# Patient Record
Sex: FEMALE | Race: BLACK OR AFRICAN AMERICAN | Employment: OTHER | URBAN - METROPOLITAN AREA
[De-identification: names, ages, dates, MRNs, and addresses within clinical notes are randomized per-mention and may not be internally consistent; named-entity substitution may affect disease eponyms.]

---

## 2017-03-20 ENCOUNTER — HOSPITAL ENCOUNTER (INPATIENT)
Age: 57
LOS: 6 days | Discharge: HOME | DRG: 202 | End: 2017-03-26
Attending: FAMILY MEDICINE | Admitting: INTERNAL MEDICINE
Payer: COMMERCIAL

## 2017-03-20 ENCOUNTER — APPOINTMENT (OUTPATIENT)
Dept: GENERAL RADIOLOGY | Age: 57
DRG: 202 | End: 2017-03-20
Attending: FAMILY MEDICINE
Payer: COMMERCIAL

## 2017-03-20 DIAGNOSIS — J45.901 ASTHMA EXACERBATION: Primary | ICD-10-CM

## 2017-03-20 LAB
ANION GAP BLD CALC-SCNC: 13 MMOL/L (ref 3–18)
BASOPHILS # BLD AUTO: 0 K/UL (ref 0–0.06)
BASOPHILS # BLD: 1 % (ref 0–2)
BUN SERPL-MCNC: 13 MG/DL (ref 7–18)
BUN/CREAT SERPL: 13 (ref 12–20)
CALCIUM SERPL-MCNC: 9.2 MG/DL (ref 8.5–10.1)
CHLORIDE SERPL-SCNC: 107 MMOL/L (ref 100–108)
CO2 SERPL-SCNC: 25 MMOL/L (ref 21–32)
CREAT SERPL-MCNC: 0.97 MG/DL (ref 0.6–1.3)
DIFFERENTIAL METHOD BLD: ABNORMAL
EOSINOPHIL # BLD: 0.1 K/UL (ref 0–0.4)
EOSINOPHIL NFR BLD: 2 % (ref 0–5)
ERYTHROCYTE [DISTWIDTH] IN BLOOD BY AUTOMATED COUNT: 11.8 % (ref 11.6–14.5)
GLUCOSE SERPL-MCNC: 116 MG/DL (ref 74–99)
HCT VFR BLD AUTO: 37 % (ref 35–45)
HGB BLD-MCNC: 12.5 G/DL (ref 12–16)
LYMPHOCYTES # BLD AUTO: 48 % (ref 21–52)
LYMPHOCYTES # BLD: 2 K/UL (ref 0.9–3.6)
MCH RBC QN AUTO: 31 PG (ref 24–34)
MCHC RBC AUTO-ENTMCNC: 33.8 G/DL (ref 31–37)
MCV RBC AUTO: 91.8 FL (ref 74–97)
MONOCYTES # BLD: 0.2 K/UL (ref 0.05–1.2)
MONOCYTES NFR BLD AUTO: 4 % (ref 3–10)
NEUTS SEG # BLD: 1.9 K/UL (ref 1.8–8)
NEUTS SEG NFR BLD AUTO: 45 % (ref 40–73)
PLATELET # BLD AUTO: 336 K/UL (ref 135–420)
PMV BLD AUTO: 10.5 FL (ref 9.2–11.8)
POTASSIUM SERPL-SCNC: 3.9 MMOL/L (ref 3.5–5.5)
RBC # BLD AUTO: 4.03 M/UL (ref 4.2–5.3)
SODIUM SERPL-SCNC: 145 MMOL/L (ref 136–145)
WBC # BLD AUTO: 4.1 K/UL (ref 4.6–13.2)

## 2017-03-20 PROCEDURE — 99284 EMERGENCY DEPT VISIT MOD MDM: CPT

## 2017-03-20 PROCEDURE — 77030013140 HC MSK NEB VYRM -A

## 2017-03-20 PROCEDURE — 96365 THER/PROPH/DIAG IV INF INIT: CPT

## 2017-03-20 PROCEDURE — 94640 AIRWAY INHALATION TREATMENT: CPT

## 2017-03-20 PROCEDURE — 94760 N-INVAS EAR/PLS OXIMETRY 1: CPT

## 2017-03-20 PROCEDURE — 74011000250 HC RX REV CODE- 250: Performed by: INTERNAL MEDICINE

## 2017-03-20 PROCEDURE — 74011250637 HC RX REV CODE- 250/637: Performed by: FAMILY MEDICINE

## 2017-03-20 PROCEDURE — 74011000250 HC RX REV CODE- 250

## 2017-03-20 PROCEDURE — 74011250636 HC RX REV CODE- 250/636: Performed by: INTERNAL MEDICINE

## 2017-03-20 PROCEDURE — 77030018846 HC SOL IRR STRL H20 ICUM -A

## 2017-03-20 PROCEDURE — 74011000250 HC RX REV CODE- 250: Performed by: FAMILY MEDICINE

## 2017-03-20 PROCEDURE — 74011250636 HC RX REV CODE- 250/636: Performed by: FAMILY MEDICINE

## 2017-03-20 PROCEDURE — 65660000000 HC RM CCU STEPDOWN

## 2017-03-20 PROCEDURE — 85025 COMPLETE CBC W/AUTO DIFF WBC: CPT | Performed by: FAMILY MEDICINE

## 2017-03-20 PROCEDURE — 71010 XR CHEST PORT: CPT

## 2017-03-20 PROCEDURE — 96375 TX/PRO/DX INJ NEW DRUG ADDON: CPT

## 2017-03-20 PROCEDURE — 80048 BASIC METABOLIC PNL TOTAL CA: CPT | Performed by: FAMILY MEDICINE

## 2017-03-20 PROCEDURE — 96361 HYDRATE IV INFUSION ADD-ON: CPT

## 2017-03-20 RX ORDER — IPRATROPIUM BROMIDE AND ALBUTEROL SULFATE 2.5; .5 MG/3ML; MG/3ML
3 SOLUTION RESPIRATORY (INHALATION)
Status: DISCONTINUED | OUTPATIENT
Start: 2017-03-20 | End: 2017-03-21

## 2017-03-20 RX ORDER — IPRATROPIUM BROMIDE AND ALBUTEROL SULFATE 2.5; .5 MG/3ML; MG/3ML
3 SOLUTION RESPIRATORY (INHALATION)
Status: COMPLETED | OUTPATIENT
Start: 2017-03-20 | End: 2017-03-20

## 2017-03-20 RX ORDER — ALBUTEROL SULFATE 0.83 MG/ML
2.5 SOLUTION RESPIRATORY (INHALATION)
Status: DISCONTINUED | OUTPATIENT
Start: 2017-03-20 | End: 2017-03-26 | Stop reason: HOSPADM

## 2017-03-20 RX ORDER — MAGNESIUM SULFATE HEPTAHYDRATE 40 MG/ML
2 INJECTION, SOLUTION INTRAVENOUS ONCE
Status: COMPLETED | OUTPATIENT
Start: 2017-03-20 | End: 2017-03-20

## 2017-03-20 RX ORDER — ALBUTEROL SULFATE 0.83 MG/ML
10 SOLUTION RESPIRATORY (INHALATION) ONCE
Status: COMPLETED | OUTPATIENT
Start: 2017-03-20 | End: 2017-03-20

## 2017-03-20 RX ORDER — IPRATROPIUM BROMIDE AND ALBUTEROL SULFATE 2.5; .5 MG/3ML; MG/3ML
3 SOLUTION RESPIRATORY (INHALATION)
Status: DISPENSED | OUTPATIENT
Start: 2017-03-20 | End: 2017-03-21

## 2017-03-20 RX ORDER — MAGNESIUM SULFATE 1 G/100ML
1 INJECTION INTRAVENOUS ONCE
Status: COMPLETED | OUTPATIENT
Start: 2017-03-20 | End: 2017-03-24

## 2017-03-20 RX ORDER — IPRATROPIUM BROMIDE AND ALBUTEROL SULFATE 2.5; .5 MG/3ML; MG/3ML
SOLUTION RESPIRATORY (INHALATION)
Status: DISPENSED
Start: 2017-03-20 | End: 2017-03-21

## 2017-03-20 RX ORDER — KETOROLAC TROMETHAMINE 30 MG/ML
30 INJECTION, SOLUTION INTRAMUSCULAR; INTRAVENOUS ONCE
Status: COMPLETED | OUTPATIENT
Start: 2017-03-20 | End: 2017-03-20

## 2017-03-20 RX ORDER — CODEINE PHOSPHATE AND GUAIFENESIN 10; 100 MG/5ML; MG/5ML
10 SOLUTION ORAL
Status: DISCONTINUED | OUTPATIENT
Start: 2017-03-20 | End: 2017-03-26 | Stop reason: HOSPADM

## 2017-03-20 RX ORDER — KETOROLAC TROMETHAMINE 30 MG/ML
15 INJECTION, SOLUTION INTRAMUSCULAR; INTRAVENOUS
Status: COMPLETED | OUTPATIENT
Start: 2017-03-20 | End: 2017-03-20

## 2017-03-20 RX ORDER — ENOXAPARIN SODIUM 100 MG/ML
40 INJECTION SUBCUTANEOUS EVERY 24 HOURS
Status: DISCONTINUED | OUTPATIENT
Start: 2017-03-20 | End: 2017-03-26 | Stop reason: HOSPADM

## 2017-03-20 RX ORDER — ALBUTEROL SULFATE 0.83 MG/ML
5 SOLUTION RESPIRATORY (INHALATION) ONCE
Status: COMPLETED | OUTPATIENT
Start: 2017-03-20 | End: 2017-03-20

## 2017-03-20 RX ORDER — MONTELUKAST SODIUM 10 MG/1
10 TABLET ORAL
COMMUNITY

## 2017-03-20 RX ORDER — IPRATROPIUM BROMIDE AND ALBUTEROL SULFATE 2.5; .5 MG/3ML; MG/3ML
SOLUTION RESPIRATORY (INHALATION)
Status: COMPLETED
Start: 2017-03-20 | End: 2017-03-20

## 2017-03-20 RX ADMIN — KETOROLAC TROMETHAMINE 15 MG: 30 INJECTION, SOLUTION INTRAMUSCULAR at 12:58

## 2017-03-20 RX ADMIN — ENOXAPARIN SODIUM 40 MG: 40 INJECTION SUBCUTANEOUS at 17:43

## 2017-03-20 RX ADMIN — MAGNESIUM SULFATE IN DEXTROSE 1 G: 10 INJECTION, SOLUTION INTRAVENOUS at 19:00

## 2017-03-20 RX ADMIN — IPRATROPIUM BROMIDE AND ALBUTEROL SULFATE 3 ML: .5; 3 SOLUTION RESPIRATORY (INHALATION) at 19:32

## 2017-03-20 RX ADMIN — SODIUM CHLORIDE 1000 ML: 900 INJECTION, SOLUTION INTRAVENOUS at 12:23

## 2017-03-20 RX ADMIN — GUAIFENESIN AND CODEINE PHOSPHATE 10 ML: 100; 10 SOLUTION ORAL at 13:50

## 2017-03-20 RX ADMIN — METHYLPREDNISOLONE SODIUM SUCCINATE 125 MG: 125 INJECTION, POWDER, FOR SOLUTION INTRAMUSCULAR; INTRAVENOUS at 23:41

## 2017-03-20 RX ADMIN — ALBUTEROL SULFATE 5 MG: 2.5 SOLUTION RESPIRATORY (INHALATION) at 13:07

## 2017-03-20 RX ADMIN — ALBUTEROL SULFATE 10 MG: 2.5 SOLUTION RESPIRATORY (INHALATION) at 15:36

## 2017-03-20 RX ADMIN — MAGNESIUM SULFATE HEPTAHYDRATE 2 G: 40 INJECTION, SOLUTION INTRAVENOUS at 12:31

## 2017-03-20 RX ADMIN — METHYLPREDNISOLONE SODIUM SUCCINATE 125 MG: 125 INJECTION, POWDER, FOR SOLUTION INTRAMUSCULAR; INTRAVENOUS at 12:32

## 2017-03-20 RX ADMIN — IPRATROPIUM BROMIDE AND ALBUTEROL SULFATE 3 ML: .5; 3 SOLUTION RESPIRATORY (INHALATION) at 12:09

## 2017-03-20 RX ADMIN — ALBUTEROL SULFATE 2.5 MG: 2.5 SOLUTION RESPIRATORY (INHALATION) at 22:57

## 2017-03-20 RX ADMIN — KETOROLAC TROMETHAMINE 30 MG: 30 INJECTION, SOLUTION INTRAMUSCULAR at 20:33

## 2017-03-20 RX ADMIN — GUAIFENESIN AND CODEINE PHOSPHATE 10 ML: 100; 10 SOLUTION ORAL at 17:42

## 2017-03-20 RX ADMIN — IPRATROPIUM BROMIDE AND ALBUTEROL SULFATE 3 ML: .5; 3 SOLUTION RESPIRATORY (INHALATION) at 11:58

## 2017-03-20 RX ADMIN — ALBUTEROL SULFATE 10 MG: 2.5 SOLUTION RESPIRATORY (INHALATION) at 12:31

## 2017-03-20 RX ADMIN — METHYLPREDNISOLONE SODIUM SUCCINATE 125 MG: 125 INJECTION, POWDER, FOR SOLUTION INTRAMUSCULAR; INTRAVENOUS at 17:48

## 2017-03-20 NOTE — IP AVS SNAPSHOT
Current Discharge Medication List  
  
START taking these medications Dose & Instructions Dispensing Information Comments Morning Noon Evening Bedtime  
 clonazePAM 0.5 mg tablet Commonly known as:  Romario Luciano Your last dose was: Your next dose is:    
   
   
 Dose:  0.5 mg Take 1 Tab by mouth three (3) times daily. Max Daily Amount: 1.5 mg.  
 Quantity:  20 Tab Refills:  0  
     
   
   
   
  
 dronabinol 2.5 mg capsule Commonly known as:  Olu Eugene Your last dose was: Your next dose is:    
   
   
 Dose:  2.5 mg Take 1 Cap by mouth Before breakfast and dinner. Max Daily Amount: 5 mg. Quantity:  20 Cap Refills:  0  
     
   
   
   
  
 guaiFENesin-codeine 100-10 mg/5 mL solution Commonly known as:  ROBITUSSIN AC Your last dose was: Your next dose is:    
   
   
 Dose:  10 mL Take 10 mL by mouth every four (4) hours as needed for Cough. Max Daily Amount: 60 mL. Quantity:  200 mL Refills:  0  
     
   
   
   
  
 levoFLOXacin 500 mg tablet Commonly known as:  Charles Muhammad Your last dose was: Your next dose is:    
   
   
 Dose:  500 mg Take 1 Tab by mouth daily. Quantity:  3 Tab Refills:  0  
     
   
   
   
  
 predniSONE 20 mg tablet Commonly known as:  Maurizio Westbrook Your last dose was: Your next dose is:    
   
   
 4 po x 2 days, 3 po x 2 days, 2 po x 2 days, 1 po x 2 days, 1/2 po x 2 days. Quantity:  30 Tab Refills:  0 CONTINUE these medications which have NOT CHANGED Dose & Instructions Dispensing Information Comments Morning Noon Evening Bedtime  
 acetaminophen 500 mg tablet Commonly known as:  TYLENOL Your last dose was: Your next dose is:    
   
   
 Dose:  1000 mg Take 1,000 mg by mouth every six (6) hours as needed for Pain or Fever. Refills:  0  
     
   
   
   
  
 albuterol 90 mcg/actuation inhaler Commonly known as:  Yifan Swanr Your last dose was: Your next dose is:    
   
   
 Dose:  1-2 Puff Take 1-2 Puffs by inhalation every four (4) hours as needed for Wheezing. Quantity:  17 g Refills:  0  
     
   
   
   
  
 aspirin delayed-release 81 mg tablet Your last dose was: Your next dose is:    
   
   
 Dose:  81 mg Take 81 mg by mouth daily. Refills:  0  
     
   
   
   
  
 fluticasone-salmeterol 250-50 mcg/dose diskus inhaler Commonly known as:  ADVAIR Your last dose was: Your next dose is:    
   
   
 Dose:  1 Puff Take 1 Puff by inhalation two (2) times a day. Quantity:  1 Inhaler Refills:  1 KEPPRA 500 mg tablet Generic drug:  levETIRAcetam  
   
Your last dose was: Your next dose is:    
   
   
 Dose:  500 mg Take 500 mg by mouth two (2) times a day. Refills:  0 LEXAPRO 10 mg tablet Generic drug:  escitalopram oxalate Your last dose was: Your next dose is:    
   
   
 Dose:  15 mg Take 15 mg by mouth daily. Refills:  0  
     
   
   
   
  
 losartan-hydroCHLOROthiazide 100-25 mg per tablet Commonly known as:  HYZAAR Your last dose was: Your next dose is:    
   
   
 Dose:  1 Tab Take 1 Tab by mouth daily. Refills:  0  
     
   
   
   
  
 naproxen 500 mg tablet Commonly known as:  NAPROSYN Your last dose was: Your next dose is:    
   
   
 Dose:  500 mg Take 500 mg by mouth two (2) times daily as needed. Refills:  0 Not taking currently  
    
   
   
   
  
 ondansetron 4 mg disintegrating tablet Commonly known as:  ZOFRAN ODT Your last dose was: Your next dose is:    
   
   
 Dose:  4 mg Take 4 mg by mouth every eight (8) hours as needed for Nausea. Refills:  0 Not currenlty taking OXcarbazepine 300 mg tablet Commonly known as:  TRILEPTAL  
 Your last dose was: Your next dose is:    
   
   
 Dose:  300 mg Take 300 mg by mouth two (2) times a day. Refills:  0 PREMARIN 0.9 mg tablet Generic drug:  conjugated estrogens Your last dose was: Your next dose is:    
   
   
 Dose:  0.9 mg Take 0.9 mg by mouth daily. Refills:  0  
     
   
   
   
  
 * PROAIR HFA 90 mcg/actuation inhaler Generic drug:  albuterol Your last dose was: Your next dose is:    
   
   
 Dose:  2 Puff Take 2 Puffs by inhalation every four (4) hours as needed for Wheezing. Refills:  0  
     
   
   
   
  
 * albuterol 2.5 mg /3 mL (0.083 %) nebulizer solution Commonly known as:  PROVENTIL VENTOLIN Your last dose was: Your next dose is:    
   
   
 Dose:  2.5 mg  
3 mL by Nebulization route three (3) times daily. Quantity:  50 Each Refills:  0 SINGULAIR 10 mg tablet Generic drug:  montelukast  
   
Your last dose was: Your next dose is:    
   
   
 Dose:  10 mg Take 10 mg by mouth daily. Refills:  0 SUMAtriptan succinate 6 mg/0.5 mL kit Your last dose was: Your next dose is:    
   
   
 Dose:  6 mg  
6 mg by SubCUTAneous route once as needed for Migraine. Refills:  0  
     
   
   
   
  
 ZANTAC 150 mg tablet Generic drug:  raNITIdine Your last dose was: Your next dose is:    
   
   
 Dose:  150 mg Take 150 mg by mouth two (2) times a day. Refills:  0  
     
   
   
   
  
 * Notice: This list has 2 medication(s) that are the same as other medications prescribed for you. Read the directions carefully, and ask your doctor or other care provider to review them with you. Where to Get Your Medications Information on where to get these meds will be given to you by the nurse or doctor. ! Ask your nurse or doctor about these medications clonazePAM 0.5 mg tablet  
 dronabinol 2.5 mg capsule  
 guaiFENesin-codeine 100-10 mg/5 mL solution  
 levoFLOXacin 500 mg tablet  
 predniSONE 20 mg tablet

## 2017-03-20 NOTE — ED TRIAGE NOTES
+audible wheezing C/o wheezing for several days. Sepsis Screening completed    ( x )Patient meets SIRS criteria. (  )Patient does not meet SIRS criteria.       SIRS Criteria is achieved when two or more of the following are present   Temperature < 96.8°F (36°C) or > 100.9°F (38.3°C)   Heart Rate > 90 beats per minute   Respiratory Rate > 20 breaths per minute   WBC count > 12,000 or <4,000 or > 10% bands

## 2017-03-20 NOTE — ED NOTES
Patient reports ribcage and back pain associated with forceful coughing and effort to breathe. Toradol administered via IV 15 mg. Tolerated well. Will monitor.

## 2017-03-20 NOTE — ED PROVIDER NOTES
[address]  EMERGENCY DEPARTMENT HISTORY AND PHYSICAL EXAM       Date: 3/20/2017   Patient Name: Sae Kat   YOB: 1960  Medical Record Number: [id number]    History of Presenting Illness     Chief Complaint   Patient presents with    Wheezing        History Provided By:  patient    Additional History:   12:10 PM   Sae Kat is a 55 y.o. female h/o asthma who presents to the emergency department C/O wheezing, onset 4 days ago. Pt has been using her nebulizer at home without relief. Symptoms include nausea, productive cough, and chest tightness. Pt states this is no different from her normal asthma flare up. Pt does not smoke tobacco or drink EtOH. Pt denies fever, chills, vomiting, LE edema, and any other symptoms or complaints. Primary Care Provider: Gladys Brenner MD   Specialist:    Past History     Past Medical History:   Past Medical History:   Diagnosis Date    Arthritis     Asthma     Chronic kidney disease     deformed right  kidney    Diabetes ([address]     Hypertension     Migraines     PUD (peptic ulcer disease)     H/O, no meds at this time 2016    Seizures ([facility] [address]     Unspecified sleep apnea     uses cpap        Past Surgical History:   Past Surgical History:   Procedure Laterality Date    HX APPENDECTOMY      HX GYN      hysterectomy        Family History:   Family History   Problem Relation Age of Onset    Malignant Hyperthermia Neg Hx     Pseudocholinesterase Deficiency Neg Hx     Delayed Awakening Neg Hx     Post-op Nausea/Vomiting Neg Hx     Emergence Delirium Neg Hx     Post-op Cognitive Dysfunction Neg Hx     Other Neg Hx         Social History:   Social History   Substance Use Topics    Smoking status: Never Smoker    Smokeless tobacco: None    Alcohol use No        Allergies:    Allergies   Allergen Reactions    Morphine Itching     Can tolerate if premed w/ benadryl    Pertussis Vaccine,Adsorbed Swelling     Swelling at injection site    Vicodin [Hydrocodone-Acetaminophen] Itching     Can tolerate if premed w/ benadryl        Review of Systems   Review of Systems   Constitutional: Negative for chills, fatigue and fever. HENT: Negative for rhinorrhea and sore throat. Respiratory: Positive for cough and wheezing. Negative for shortness of breath. Cardiovascular: Negative for chest pain, palpitations and leg swelling. Gastrointestinal: Positive for nausea. Negative for abdominal pain, diarrhea and vomiting. Genitourinary: Negative for difficulty urinating and dysuria. Musculoskeletal: Negative for arthralgias and myalgias. Skin: Negative for color change and rash. Neurological: Negative for light-headedness and headaches. All other systems reviewed and are negative. Physical Exam  Vitals:    03/20/17 1156 03/20/17 1310   BP: (!) 156/104 139/70   Pulse: 99 (!) 106   Resp: 22 26   Temp: 97.6 °F (36.4 °C)    SpO2: 100% 100%   Weight: 77.1 kg (170 lb)    Height: 5' 2\" (1.575 m)        Physical Exam   Nursing note and vitals reviewed. Vital signs and nursing notes reviewed    CONSTITUTIONAL: Alert, in no apparent distress; Middle age. Overweight. HEAD:  Normocephalic, atraumatic  EYES: PERRL; EOM's intact. ENTM: Nose: no rhinorrhea; Throat: no erythema or exudate, mucous membranes moist  Neck:  No JVD, supple without lymphadenopathy  RESP: Chest clear, equal breath sounds. Tachypneic  CV: S1 and S2 WNL; No murmurs, gallops or rubs. Tachycardic. GI: Normal bowel sounds, abdomen soft and non-tender. No masses or organomegaly. : No costo-vertebral angle tenderness. BACK:  Non-tender  UPPER EXT:  Normal inspection  LOWER EXT: No edema, no calf tenderness. Distal pulses intact. NEURO: CN intact, reflexes 2/4 and sym, strength 5/5 and sym, sensation intact. SKIN: No rashes; Normal for age and stage. PSYCH:  Alert and oriented, normal affect.     Diagnostic Study Results     Labs -      Recent Results (from the past 12 hour(s))   CBC WITH AUTOMATED DIFF    Collection Time: 03/20/17 12:13 PM   Result Value Ref Range    WBC 4.1 (L) 4.6 - 13.2 K/uL    RBC 4.03 (L) 4.20 - 5.30 M/uL    HGB 12.5 12.0 - 16.0 g/dL    HCT 37.0 35.0 - 45.0 %    MCV 91.8 74.0 - 97.0 FL    MCH 31.0 24.0 - 34.0 PG    MCHC 33.8 31.0 - 37.0 g/dL    RDW 11.8 11.6 - 14.5 %    PLATELET 283 726 - 948 K/uL    MPV 10.5 9.2 - 11.8 FL    NEUTROPHILS 45 40 - 73 %    LYMPHOCYTES 48 21 - 52 %    MONOCYTES 4 3 - 10 %    EOSINOPHILS 2 0 - 5 %    BASOPHILS 1 0 - 2 %    ABS. NEUTROPHILS 1.9 1.8 - 8.0 K/UL    ABS. LYMPHOCYTES 2.0 0.9 - 3.6 K/UL    ABS. MONOCYTES 0.2 0.05 - 1.2 K/UL    ABS. EOSINOPHILS 0.1 0.0 - 0.4 K/UL    ABS. BASOPHILS 0.0 0.0 - 0.06 K/UL    DF AUTOMATED     METABOLIC PANEL, BASIC    Collection Time: 03/20/17 12:13 PM   Result Value Ref Range    Sodium 145 136 - 145 mmol/L    Potassium 3.9 3.5 - 5.5 mmol/L    Chloride 107 100 - 108 mmol/L    CO2 25 21 - 32 mmol/L    Anion gap 13 3.0 - 18 mmol/L    Glucose 116 (H) 74 - 99 mg/dL    BUN 13 7.0 - 18 MG/DL    Creatinine 0.97 0.6 - 1.3 MG/DL    BUN/Creatinine ratio 13 12 - 20      GFR est AA >60 >60 ml/min/1.73m2    GFR est non-AA 60 (L) >60 ml/min/1.73m2    Calcium 9.2 8.5 - 10.1 MG/DL       Radiologic Studies -  XR CHEST PORT   Final Result   1. Pulmonary hypoinflation degraded examination with asymmetric nonspecific  infrahilar left lower lung opacity, which may represent atelectasis/pneumonia. As read by the radiologist.       Medical Decision Making   I am the first provider for this patient. I reviewed the vital signs, available nursing notes, past medical history, past surgical history, family history and social history. Vital Signs-Reviewed the patient's vital signs.    Patient Vitals for the past 12 hrs:   Temp Pulse Resp BP SpO2   03/20/17 1310 - (!) 106 26 139/70 100 %   03/20/17 1156 97.6 °F (36.4 °C) 99 22 (!) 156/104 100 %       Pulse Oximetry Analysis - Normal 100% on room air.      Cardiac Monitor:   Rate: 113 bpm   Rhythm: Sinus Tachycardia      Old Medical Records: Nursing notes. Provider Notes:   INITIAL CLINICAL IMPRESSION and PLANS:  The patient presents with the primary complaint(s) of: wheezing. The presentation, to include historical aspects and clinical findings are consistent with the DX of asthma exacerbation. However, other possible DX's to consider as primary, associated with, or exacerbated by include:    1. CHF   2. PNA      Considering the above, my initial management plan to evaluate and therapeutic interventions include the following and as noted in the orders:    1. Labs: CBC, BMP  2.  Imaging: CXR      ED Course:    12:10 PM    Initial assessment performed. 3:00 PM Still wheezing. Tachycardic. Will do another neb treatment and admit.     3:03 PM Discussed patient's history, exam, and available diagnostics results with Lorena Tapia MD, internal medicine, who agree with admission to telemetry. 3:04 PM  Patient is being admitted to the hospital by Lorena Tapia MD. The results of their tests and reasons for their admission have been discussed with them and/or available family. They convey agreement and understanding for the need to be admitted and for their admission diagnosis. CONDITIONS ON ADMISSION:  Deep Vein Thrombosis is not present at the time of admission. Thrombosis is not present at the time of admission. Urinary Tract Infection is not present at the time of admission. Pneumonia is not present at the time of admission. MRSA is not present at the time of admission. Wound infection is not present at the time of admission. Pressure Ulcer is not present at the time of admission.       Medications Given in the ED:  Medications   albuterol-ipratropium (DUO-NEB) 2.5 MG-0.5 MG/3 ML ( Nebulization Canceled Entry 3/20/17 1207)   guaiFENesin-codeine (ROBITUSSIN AC) 100-10 mg/5 mL solution 10 mL (10 mL Oral Given 3/20/17 9930) albuterol-ipratropium (DUO-NEB) 2.5 MG-0.5 MG/3 ML (3 mL Nebulization Given 3/20/17 1158)   albuterol-ipratropium (DUO-NEB) 2.5 mg-0.5 mg/3 ml nebulizer solution (3 mL  Given 3/20/17 1209)   sodium chloride 0.9 % bolus infusion 1,000 mL (0 mL IntraVENous IV Completed 3/20/17 1336)   albuterol (PROVENTIL VENTOLIN) nebulizer solution 10 mg (10 mg Nebulization Given 3/20/17 1231)   methylPREDNISolone (PF) (SOLU-MEDROL) injection 125 mg (125 mg IntraVENous Given 3/20/17 1232)   magnesium sulfate 2 g/50 ml IVPB (premix or compounded) (0 g IntraVENous IV Completed 3/20/17 1259)   ketorolac (TORADOL) injection 15 mg (15 mg IntraVENous Given 3/20/17 1258)   albuterol (PROVENTIL VENTOLIN) nebulizer solution 5 mg (5 mg Nebulization Given 3/20/17 1307)     Diagnosis   Clinical Impression:   1. Asthma exacerbation         Discussion:  Pt resented with increase SOB  and wheezing. Sats stable CXR shows probably atelectasis. No fever or elevated WBC, so doubt PNA. She will be given nebs, Solumedrol, and magnesium. She will be admitted. 5:54 PM  I have spent30  minutes of critical care time involved in lab review, consultations with specialist, family decision-making, and documentation. During this entire length of time I was immediately available to the patient. Critical Care: The reason for providing this level of medical care for this critically ill patient was due a critical illness that impaired one or more vital organ systems such that there was a high probability of imminent or life threatening deterioration in the patients condition. This care involved high complexity decision making to assess, manipulate, and support vital system functions, to treat this degreee vital organ system failure and to prevent further life threatening deterioration of the patients condition. _______________________________   Attestations:      This note is prepared by Leonarda Beal, acting as a Scribe for Josey Leonardo MD on 12:09 PM on 3/20/2017 . Antonieta Alex MD: The scribe's documentation has been prepared under my direction and personally reviewed by me in its entirety.   _______________________________

## 2017-03-20 NOTE — ED NOTES
Nebulizer treatments are complete. Patient continues to cough and have inspiratory and expiratory wheezing. Respiratory paged.

## 2017-03-20 NOTE — ED NOTES
Patient reports feeling better, however inspiratory and expiratory wheezing still noted. Coughing has less frequent and productive. Sats 100%. Will monitor.

## 2017-03-20 NOTE — ED NOTES
Duoneb administered. Patient has inspiratory and expiratory wheezing noted. Acute asthma exacerbation.  Sats 100%

## 2017-03-20 NOTE — ED NOTES
TRANSFER - OUT REPORT:    Verbal report given to Jasen Monae RN(name) on Benjie Soriano  being transferred to tele(unit) for routine progression of care       Report consisted of patients Situation, Background, Assessment and   Recommendations(SBAR). Information from the following report(s) SBAR, Kardex and ED Summary was reviewed with the receiving nurse. Lines:   Peripheral IV 03/20/17 Right Antecubital (Active)   Site Assessment Clean, dry, & intact 3/20/2017 12:48 PM   Phlebitis Assessment 0 3/20/2017 12:48 PM   Infiltration Assessment 0 3/20/2017 12:48 PM   Dressing Status Clean, dry, & intact 3/20/2017 12:48 PM   Dressing Type Transparent 3/20/2017 12:48 PM   Hub Color/Line Status Pink 3/20/2017 12:48 PM        Opportunity for questions and clarification was provided.       Patient transported with:   [employer]

## 2017-03-20 NOTE — H&P
History & Physical    Patient: Rigo Boyd MRN: [id number]  CSN: [id number]    YOB: 1960  Age: 55 y.o. Sex: female      DOA: 3/20/2017  Primary Care Provider:  Yaw Meza MD      Assessment/Plan     Patient Active Problem List   Diagnosis Code    Screen for colon cancer Z12.11    Acute respiratory insufficiency (HCC) R06.89    Asthma exacerbation J45. 901    Tachycardia R00.0    HTN (hypertension) I10    Pneumonia J18.9    Hypokalemia E87.6    OPAL (acute kidney injury) (HCC) N17.9         -Acute asthma exacerbation.  -Bronchitis.  -Allergies. -Migraine HA/  -Depression. -HTN     Admit. Continue treatment targeted at asthma. Continue high dose steroids, scheduled and prn nebs. Low threshold for addition of bipap / transfer to icu. Last visit required a few days in icu. Empiric abx. Antitussive. Continue outpatient regular routine meds. DVT px. Dispo anticipate a few days in house. CC: dyspnea       HPI:     Rigo Boyd is a 55 y.o. female who was admitted in the fall. Comes in with wheezing and dyspnea for the past 4 days. Dry cough. Hx of asthma with exacerbation requiring hospital stays. Has been intubated in the past.  In fall 2016 required a couple days in the icu for close monitoring. In the ER received multiple doses of bronchodilators and magnesium. Asked to admit for continuation of care. Feels symptoms are improving with treatment received so far.     Past Medical History:   Diagnosis Date    Arthritis     Asthma     Chronic kidney disease     deformed right  kidney    Diabetes ([address]     Hypertension     Migraines     PUD (peptic ulcer disease)     H/O, no meds at this time 2016    Seizures ([address]     Unspecified sleep apnea     uses cpap       Past Surgical History:   Procedure Laterality Date    HX APPENDECTOMY      HX GYN      hysterectomy       Family History   Problem Relation Age of Onset    Malignant Hyperthermia Neg Hx     Pseudocholinesterase Deficiency Neg Hx     Delayed Awakening Neg Hx     Post-op Nausea/Vomiting Neg Hx     Emergence Delirium Neg Hx     Post-op Cognitive Dysfunction Neg Hx     Other Neg Hx        Social History     Social History    Marital status:      Spouse name: N/A    Number of children: N/A    Years of education: N/A     Social History Main Topics    Smoking status: Never Smoker    Smokeless tobacco: None    Alcohol use No    Drug use: No    Sexual activity: Not Asked     Other Topics Concern    None     Social History Narrative       Prior to Admission medications    Medication Sig Start Date End Date Taking? Authorizing Provider   raNITIdine (ZANTAC) 150 mg tablet Take 150 mg by mouth two (2) times a day. Yes Anaya Leach MD   conjugated estrogens (PREMARIN) 0.9 mg tablet Take 0.9 mg by mouth daily. Yes Anaya Leach MD   fluticasone-salmeterol (ADVAIR) 250-50 mcg/dose diskus inhaler Take 1 Puff by inhalation two (2) times a day. 11/9/16  Yes Ted Flores MD   escitalopram oxalate (LEXAPRO) 10 mg tablet Take 15 mg by mouth daily. Yes Historical Provider   aspirin delayed-release 81 mg tablet Take 81 mg by mouth daily. Yes Historical Provider   levETIRAcetam (KEPPRA) 500 mg tablet Take 500 mg by mouth two (2) times a day. Yes Kirk Ryan MD   losartan-hydrochlorothiazide (HYZAAR) 100-25 mg per tablet Take 1 Tab by mouth daily. Yes Kirk Ryan MD   albuterol (PROVENTIL, VENTOLIN) 90 mcg/actuation inhaler Take 1-2 Puffs by inhalation every four (4) hours as needed for Wheezing. 12/15/12  Yes Rosanne Grady MD   OXcarbazepine (TRILEPTAL) 300 mg tablet Take 300 mg by mouth two (2) times a day. Yes Kirk Ryan MD   naproxen (NAPROSYN) 500 mg tablet Take 500 mg by mouth two (2) times daily as needed.     Historical Provider   ondansetron (ZOFRAN ODT) 4 mg disintegrating tablet Take 4 mg by mouth every eight (8) hours as needed for Nausea. Orville Gamble MD   acetaminophen (TYLENOL) 500 mg tablet Take 1,000 mg by mouth every six (6) hours as needed for Pain or Fever. Orville Gamble MD   albuterol (PROVENTIL VENTOLIN) 2.5 mg /3 mL (0.083 %) nebulizer solution 3 mL by Nebulization route three (3) times daily. 16   Elias Covington MD   albuterol [facility] HFA) 90 mcg/actuation inhaler Take 2 Puffs by inhalation every four (4) hours as needed for Wheezing. Historical Provider   SUMAtriptan succinate 6 mg/0.5 mL kit 6 mg by SubCUTAneous route once as needed for Migraine. Kirk Ryan MD       Allergies   Allergen Reactions    Morphine Itching     Can tolerate if premed w/ benadryl    Pertussis Vaccine,Adsorbed Swelling     Swelling at injection site    Vicodin [Hydrocodone-Acetaminophen] Itching     Can tolerate if premed w/ benadryl       Review of Systems  Gen: No fever, chills, malaise, weight loss/gain. Heent: No headache, rhinorrhea, epistaxis, ear pain, hearing loss, sinus pain, neck pain/stiffness, sore throat. Heart: No chest pain, palpitations, DELATORRE, pnd, or orthopnea. Resp: +sob and wheezing. GI: No nausea, vomiting, diarrhea, constipation, melena or hematochezia. : No urinary obstruction, dysuria or hematuria. Derm: No rash, new skin lesion or pruritis. Musc/skeletal: no bone or joint complains. Vasc: No edema, cyanosis or claudication. Endo: No heat/cold intolerance, no polyuria,polydipsia or polyphagia. Neuro: No unilateral weakness, numbness, tingling. No seizures. Heme: No easy bruising or bleeding.           Physical Exam:     Physical Exam:  Visit Vitals    /87 (BP 1 Location: Right arm, BP Patient Position: At rest)    Pulse (!) 109    Temp 98.1 °F (36.7 °C)    Resp 22    Ht 5' 2\" (1.575 m)    Wt 77.1 kg (170 lb)    SpO2 100%    BMI 31.09 kg/m2      O2 Device: Room air    Temp (24hrs), Av.3 °F (36.8 °C), Min:97.6 °F (36.4 °C), Max:98.8 °F (37.1 °C)             General: Awake, cooperative, no distress. Head:  Normocephalic, without obvious abnormality, atraumatic. Eyes:  Conjunctivae/corneas clear, sclera anicteric, PERRL, EOMs intact. Nose: Nares normal. No drainage or sinus tenderness. Throat: Lips, mucosa, and tongue normal.    Neck: Supple, symmetrical, trachea midline, no adenopathy. Lungs:   B/l wheezing. No rales. No accessory muscle use. No distress. Coughing. Non productive. Heart:  Regular rate and rhythm, S1, S2 normal, no murmur, click, rub or gallop. Abdomen: Soft, non-tender. Bowel sounds normal. No masses,  No organomegaly. Extremities: Extremities normal, atraumatic, no cyanosis or edema. Capillary refill normal.   Pulses: 2+ and symmetric all extremities. Skin: Skin color pink/pale/mottled/flushed, turgor normal. No rashes or lesions   Neurologic: CNII-XII intact. No focal motor or sensory deficit. Labs Reviewed: All lab results for the last 24 hours reviewed. Recent Results (from the past 24 hour(s))   CBC WITH AUTOMATED DIFF    Collection Time: 03/20/17 12:13 PM   Result Value Ref Range    WBC 4.1 (L) 4.6 - 13.2 K/uL    RBC 4.03 (L) 4.20 - 5.30 M/uL    HGB 12.5 12.0 - 16.0 g/dL    HCT 37.0 35.0 - 45.0 %    MCV 91.8 74.0 - 97.0 FL    MCH 31.0 24.0 - 34.0 PG    MCHC 33.8 31.0 - 37.0 g/dL    RDW 11.8 11.6 - 14.5 %    PLATELET 528 426 - 700 K/uL    MPV 10.5 9.2 - 11.8 FL    NEUTROPHILS 45 40 - 73 %    LYMPHOCYTES 48 21 - 52 %    MONOCYTES 4 3 - 10 %    EOSINOPHILS 2 0 - 5 %    BASOPHILS 1 0 - 2 %    ABS. NEUTROPHILS 1.9 1.8 - 8.0 K/UL    ABS. LYMPHOCYTES 2.0 0.9 - 3.6 K/UL    ABS. MONOCYTES 0.2 0.05 - 1.2 K/UL    ABS. EOSINOPHILS 0.1 0.0 - 0.4 K/UL    ABS.  BASOPHILS 0.0 0.0 - 0.06 K/UL    DF AUTOMATED     METABOLIC PANEL, BASIC    Collection Time: 03/20/17 12:13 PM   Result Value Ref Range    Sodium 145 136 - 145 mmol/L    Potassium 3.9 3.5 - 5.5 mmol/L    Chloride 107 100 - 108 mmol/L    CO2 25 21 - 32 mmol/L    Anion gap 13 3.0 - 18 mmol/L    Glucose 116 (H) 74 - 99 mg/dL    BUN 13 7.0 - 18 MG/DL    Creatinine 0.97 0.6 - 1.3 MG/DL    BUN/Creatinine ratio 13 12 - 20      GFR est AA >60 >60 ml/min/1.73m2    GFR est non-AA 60 (L) >60 ml/min/1.73m2    Calcium 9.2 8.5 - 10.1 MG/DL             CC: Shena Jewell MD

## 2017-03-20 NOTE — IP AVS SNAPSHOT
[address] [place] [id number] 
[phone] Patient: Joss James MRN: [id number] HSR:1/60/4329 You are allergic to the following Allergen Reactions Morphine Itching Can tolerate if premed w/ benadryl Pertussis Vaccine,Adsorbed Swelling Swelling at injection site Vicodin (Hydrocodone-Acetaminophen) Itching Can tolerate if premed w/ benadryl Recent Documentation Height Weight BMI OB Status Smoking Status 1.575 m 77.4 kg 31.2 kg/m2 Hysterectomy Never Smoker Emergency Contacts Name Discharge Info Relation Home Work Mobile Nba Dianeney DISCHARGE CAREGIVER [3] Spouse [3]   [phone] About your hospitalization You were admitted on:  March 20, 2017 You last received care in the:  [address] [place] [address] You were discharged on:  March 26, 2017 Unit phone number:  [phone] Why you were hospitalized Your primary diagnosis was:  Asthma Exacerbation Your diagnoses also included:  Constipation, Hypoxia Providers Seen During Your Hospitalizations Provider Role Specialty Primary office phone Jose Simmons MD Attending Provider Emergency Medicine [phone] Leeanna Baxter MD Attending Provider Internal Medicine [phone] Sakina Haider MD Attending Provider Internal Medicine [phone] Your Primary Care Physician (PCP) Primary Care Physician Office Phone Office Fax Lynn Land A [phone] Follow-up Information Follow up With Details Comments Contact Info Arslan Antoine MD Schedule an appointment as soon as possible for a visit in 2 days  [address] [place] [address] [address] [phone] [facility] EMERGENCY DEPT  As needed, If symptoms worsen [address]ultz [phone] 
[phone] Current Discharge Medication List  
  
 START taking these medications Dose & Instructions Dispensing Information Comments Morning Noon Evening Bedtime  
 clonazePAM 0.5 mg tablet Commonly known as:  Tato Smith Your last dose was: Your next dose is:    
   
   
 Dose:  0.5 mg Take 1 Tab by mouth three (3) times daily. Max Daily Amount: 1.5 mg.  
 Quantity:  20 Tab Refills:  0  
     
   
   
   
  
 dronabinol 2.5 mg capsule Commonly known as:  Marily Nickolaith Your last dose was: Your next dose is:    
   
   
 Dose:  2.5 mg Take 1 Cap by mouth Before breakfast and dinner. Max Daily Amount: 5 mg. Quantity:  20 Cap Refills:  0  
     
   
   
   
  
 guaiFENesin-codeine 100-10 mg/5 mL solution Commonly known as:  ROBITUSSIN AC Your last dose was: Your next dose is:    
   
   
 Dose:  10 mL Take 10 mL by mouth every four (4) hours as needed for Cough. Max Daily Amount: 60 mL. Quantity:  200 mL Refills:  0  
     
   
   
   
  
 levoFLOXacin 500 mg tablet Commonly known as:  Gloria Garza Your last dose was: Your next dose is:    
   
   
 Dose:  500 mg Take 1 Tab by mouth daily. Quantity:  3 Tab Refills:  0  
     
   
   
   
  
 predniSONE 20 mg tablet Commonly known as:  Carlita Pal Your last dose was: Your next dose is:    
   
   
 4 po x 2 days, 3 po x 2 days, 2 po x 2 days, 1 po x 2 days, 1/2 po x 2 days. Quantity:  30 Tab Refills:  0 CONTINUE these medications which have NOT CHANGED Dose & Instructions Dispensing Information Comments Morning Noon Evening Bedtime  
 acetaminophen 500 mg tablet Commonly known as:  TYLENOL Your last dose was: Your next dose is:    
   
   
 Dose:  1000 mg Take 1,000 mg by mouth every six (6) hours as needed for Pain or Fever. Refills:  0  
     
   
   
   
  
 albuterol 90 mcg/actuation inhaler Commonly known as:  Vertaye Jones  
   
 Your last dose was: Your next dose is:    
   
   
 Dose:  1-2 Puff Take 1-2 Puffs by inhalation every four (4) hours as needed for Wheezing. Quantity:  17 g Refills:  0  
     
   
   
   
  
 aspirin delayed-release 81 mg tablet Your last dose was: Your next dose is:    
   
   
 Dose:  81 mg Take 81 mg by mouth daily. Refills:  0  
     
   
   
   
  
 fluticasone-salmeterol 250-50 mcg/dose diskus inhaler Commonly known as:  ADVAIR Your last dose was: Your next dose is:    
   
   
 Dose:  1 Puff Take 1 Puff by inhalation two (2) times a day. Quantity:  1 Inhaler Refills:  1 KEPPRA 500 mg tablet Generic drug:  levETIRAcetam  
   
Your last dose was: Your next dose is:    
   
   
 Dose:  500 mg Take 500 mg by mouth two (2) times a day. Refills:  0 LEXAPRO 10 mg tablet Generic drug:  escitalopram oxalate Your last dose was: Your next dose is:    
   
   
 Dose:  15 mg Take 15 mg by mouth daily. Refills:  0  
     
   
   
   
  
 losartan-hydroCHLOROthiazide 100-25 mg per tablet Commonly known as:  HYZAAR Your last dose was: Your next dose is:    
   
   
 Dose:  1 Tab Take 1 Tab by mouth daily. Refills:  0  
     
   
   
   
  
 naproxen 500 mg tablet Commonly known as:  NAPROSYN Your last dose was: Your next dose is:    
   
   
 Dose:  500 mg Take 500 mg by mouth two (2) times daily as needed. Refills:  0 Not taking currently  
    
   
   
   
  
 ondansetron 4 mg disintegrating tablet Commonly known as:  ZOFRAN ODT Your last dose was: Your next dose is:    
   
   
 Dose:  4 mg Take 4 mg by mouth every eight (8) hours as needed for Nausea. Refills:  0 Not currenlty taking OXcarbazepine 300 mg tablet Commonly known as:  TRILEPTAL Your last dose was: Your next dose is:    
   
   
 Dose:  300 mg Take 300 mg by mouth two (2) times a day. Refills:  0 PREMARIN 0.9 mg tablet Generic drug:  conjugated estrogens Your last dose was: Your next dose is:    
   
   
 Dose:  0.9 mg Take 0.9 mg by mouth daily. Refills:  0  
     
   
   
   
  
 * PROAIR HFA 90 mcg/actuation inhaler Generic drug:  albuterol Your last dose was: Your next dose is:    
   
   
 Dose:  2 Puff Take 2 Puffs by inhalation every four (4) hours as needed for Wheezing. Refills:  0  
     
   
   
   
  
 * albuterol 2.5 mg /3 mL (0.083 %) nebulizer solution Commonly known as:  PROVENTIL VENTOLIN Your last dose was: Your next dose is:    
   
   
 Dose:  2.5 mg  
3 mL by Nebulization route three (3) times daily. Quantity:  50 Each Refills:  0 SINGULAIR 10 mg tablet Generic drug:  montelukast  
   
Your last dose was: Your next dose is:    
   
   
 Dose:  10 mg Take 10 mg by mouth daily. Refills:  0 SUMAtriptan succinate 6 mg/0.5 mL kit Your last dose was: Your next dose is:    
   
   
 Dose:  6 mg  
6 mg by SubCUTAneous route once as needed for Migraine. Refills:  0  
     
   
   
   
  
 ZANTAC 150 mg tablet Generic drug:  raNITIdine Your last dose was: Your next dose is:    
   
   
 Dose:  150 mg Take 150 mg by mouth two (2) times a day. Refills:  0  
     
   
   
   
  
 * Notice: This list has 2 medication(s) that are the same as other medications prescribed for you. Read the directions carefully, and ask your doctor or other care provider to review them with you. Where to Get Your Medications Information on where to get these meds will be given to you by the nurse or doctor. ! Ask your nurse or doctor about these medications  
  clonazePAM 0.5 mg tablet  
 dronabinol 2.5 mg capsule guaiFENesin-codeine 100-10 mg/5 mL solution  
 levoFLOXacin 500 mg tablet  
 predniSONE 20 mg tablet Discharge Instructions Lab Results Component Value Date/Time Hemoglobin A1c 6.0 03/21/2017 11:42 AM  
 
 
 
 
This lab test reflects that your blood sugar has been slightly elevated over the past 3 months and should be evaluated by your primary care provider. An A1C of 5.7-6.4% meets the criteria for pre-diabetes; an A1C of 6.5% or higher meets the criteria for diabetes. Steroids can increase your blood sugar so it is important to follow up with your provider to determine if your blood sugar has returned to normal or needs further treatment. This lab test reflects that your blood sugar averaged 125 mg/dl  over the past 3 months. It is important to follow up with your provider on a routine basis to continue to evaluate your blood sugar and discuss any necessary changes in treatment. DISCHARGE SUMMARY from Nurse The following personal items are in your possession at time of discharge: 
 
Dental Appliances: None Visual Aid: Glasses, With patient Home Medications: None Jewelry: Gwenetta Burn Clothing: At bedside Other Valuables: Cell Phone PATIENT INSTRUCTIONS: 
 
 
F-face looks uneven A-arms unable to move or move unevenly S-speech slurred or non-existent T-time-call 911 as soon as signs and symptoms begin-DO NOT go Back to bed or wait to see if you get better-TIME IS BRAIN. Warning Signs of HEART ATTACK Call 911 if you have these symptoms: 
? Chest discomfort. Most heart attacks involve discomfort in the center of the chest that lasts more than a few minutes, or that goes away and comes back. It can feel like uncomfortable pressure, squeezing, fullness, or pain. ? Discomfort in other areas of the upper body. Symptoms can include pain or discomfort in one or both arms, the back, neck, jaw, or stomach. ? Shortness of breath with or without chest discomfort. ? Other signs may include breaking out in a cold sweat, nausea, or lightheadedness. Don't wait more than five minutes to call [address]! Fast action can save your life. Calling 911 is almost always the fastest way to get lifesaving treatment. Emergency Medical Services staff can begin treatment when they arrive  up to an hour sooner than if someone gets to the hospital by car. The discharge information has been reviewed with the patient and spouse. The patient and spouse verbalized understanding. Discharge medications reviewed with the patient and spouse and appropriate educational materials and side effects teaching were provided. Patient armband removed and shredded. Learning About Asthma Triggers What are triggers? When you have asthma, certain things can make your symptoms worse. These are called triggers. They include: · Cigarette smoke or air pollution. · Things you are allergic to, such as: 
¨ Pollen, mold, or dust mites. ¨ Pet hair, skin, or saliva. · Illnesses, like colds, flu, or pneumonia. · Exercise. · Dry, cold air. How do triggers affect asthma? Triggers can make it harder for your lungs to work as they should and can lead to sudden difficulty breathing and other symptoms. When you are around a trigger, an asthma attack is more likely. If your symptoms are severe, you may need emergency treatment or have to go to the hospital for treatment. If you know what your triggers are and can avoid them, you may be able to prevent asthma attacks, reduce how often you have them, and make them less severe. What can you do to avoid triggers? The first thing is to know your triggers.  
When you are having symptoms, note the things around you that might be causing them. Then look for patterns in what may be triggering your symptoms. Record your triggers on a piece of paper or in an asthma diary. When you have your list of possible triggers, work with your doctor to find ways to avoid them. You also can check how well your lungs are working by measuring your peak expiratory flow (PEF) throughout the day. Your PEF may drop when you are near things that trigger symptoms. Here are some ways to avoid a few common triggers. · Do not smoke or allow others to smoke around you. If you need help quitting, talk to your doctor about stop-smoking programs and medicines. These can increase your chances of quitting for good. · If there is a lot of pollution, pollen, or dust outside, stay at home and keep your windows closed. Use an air conditioner or air filter in your home. Check your local weather report or newspaper for air quality and pollen reports. · Get a flu shot every year. Talk to your doctor about getting a pneumococcal shot. Wash your hands often to prevent infections. · Avoid exercising outdoors in cold weather. If you are outdoors in cold weather, wear a scarf around your face and breathe through your nose. How can you manage an asthma attack? · If you have an asthma action plan, follow the plan. In general: ¨ Use your quick-relief inhaler as directed by your doctor. If your symptoms do not get better after you use your medicine, have someone take you to the emergency room. Call an ambulance if needed. ¨ If your doctor has given you other inhaled medicines or steroid pills, take them as directed. Where can you learn more? Go to [employer] [place] Enter K811 in the search box to learn more about \"Learning About Asthma Triggers. \"  
© 9921-7445 Healthwise, Incorporated. Care instructions adapted under license by [employer] (which disclaims liability or warranty for this information).  This care instruction is for use with your licensed healthcare professional. If you have questions about a medical condition or this instruction, always ask your healthcare professional. [address] any warranty or liability for your use of this information. Content Version: 61.2.271250; Last Revised: February 23, 2012 Discharge Orders None Introducing \[facility] [place] [facility]\"" SERVICES! Noel Torrez introduces [employer] patient portal. Now you can access parts of your medical record, email your doctor's office, and request medication refills online. 1. In your internet browser, go to [email or web address] 2. Click on the First Time User? Click Here link in the Sign In box. You will see the New Member Sign Up page. 3. Enter your [employer] Access Code exactly as it appears below. You will not need to use this code after youve completed the sign-up process. If you do not sign up before the expiration date, you must request a new code. · [employer] Access Code: [email or web address] Expires: 6/18/2017 12:01 PM 
 
4. Enter the last four digits of your Social Security Number (xxxx) and Date of Birth (mm/dd/yyyy) as indicated and click Submit. You will be taken to the next sign-up page. 5. Create a V[employer]ive ID. This will be your [employer] login ID and cannot be changed, so think of one that is secure and easy to remember. 6. Create a [employer] password. You can change your password at any time. 7. Enter your Password Reset Question and Answer. This can be used at a later time if you forget your password. 8. Enter your e-mail address. You will receive e-mail notification when new information is available in [address] 9. Click Sign Up. You can now view and download portions of your medical record. 10. Click the Download Summary menu link to download a portable copy of your medical information.  
 
If you have questions, please visit the Frequently Asked Questions section of the [employer] [facility]. Remember, MyChart is NOT to be used for urgent needs. For medical emergencies, dial 911. Now available from your iPhone and Android! General Information Please provide this summary of care documentation to your next provider. Patient Signature:  ____________________________________________________________ Date:  ____________________________________________________________  
  
Josph Perfect Provider Signature:  ____________________________________________________________ Date:  ____________________________________________________________

## 2017-03-20 NOTE — ED NOTES
Albuterol 10 mg via nebulizer ordered. In progress at present time. Tolerating well. Continues to have inspiratory and expiratory wheezing. Intermittent coughing.

## 2017-03-20 NOTE — ED NOTES
Patient currently receiving nebulizer treatment. Continues to have inspiratory and expiratory wheezing. Intermittent coughing that is non-productive. Magnesium and NSS infusing at present. Will monitor.

## 2017-03-21 LAB
ANION GAP BLD CALC-SCNC: 16 MMOL/L (ref 3–18)
BUN SERPL-MCNC: 11 MG/DL (ref 7–18)
BUN/CREAT SERPL: 10 (ref 12–20)
CALCIUM SERPL-MCNC: 8.7 MG/DL (ref 8.5–10.1)
CHLORIDE SERPL-SCNC: 104 MMOL/L (ref 100–108)
CO2 SERPL-SCNC: 20 MMOL/L (ref 21–32)
CREAT SERPL-MCNC: 1.06 MG/DL (ref 0.6–1.3)
EST. AVERAGE GLUCOSE BLD GHB EST-MCNC: 126 MG/DL
FLUAV AG NPH QL IA: NEGATIVE
FLUBV AG NOSE QL IA: NEGATIVE
GLUCOSE BLD STRIP.AUTO-MCNC: 132 MG/DL (ref 70–110)
GLUCOSE BLD STRIP.AUTO-MCNC: 152 MG/DL (ref 70–110)
GLUCOSE BLD STRIP.AUTO-MCNC: 166 MG/DL (ref 70–110)
GLUCOSE SERPL-MCNC: 180 MG/DL (ref 74–99)
HBA1C MFR BLD: 6 % (ref 4.5–5.6)
POTASSIUM SERPL-SCNC: 3.5 MMOL/L (ref 3.5–5.5)
SODIUM SERPL-SCNC: 140 MMOL/L (ref 136–145)

## 2017-03-21 PROCEDURE — 94640 AIRWAY INHALATION TREATMENT: CPT

## 2017-03-21 PROCEDURE — 74011250637 HC RX REV CODE- 250/637: Performed by: HOSPITALIST

## 2017-03-21 PROCEDURE — 74011250636 HC RX REV CODE- 250/636: Performed by: INTERNAL MEDICINE

## 2017-03-21 PROCEDURE — 94760 N-INVAS EAR/PLS OXIMETRY 1: CPT

## 2017-03-21 PROCEDURE — 82962 GLUCOSE BLOOD TEST: CPT

## 2017-03-21 PROCEDURE — 74011250637 HC RX REV CODE- 250/637: Performed by: FAMILY MEDICINE

## 2017-03-21 PROCEDURE — 74011000250 HC RX REV CODE- 250: Performed by: INTERNAL MEDICINE

## 2017-03-21 PROCEDURE — 74011250637 HC RX REV CODE- 250/637: Performed by: INTERNAL MEDICINE

## 2017-03-21 PROCEDURE — 65660000000 HC RM CCU STEPDOWN

## 2017-03-21 PROCEDURE — 74011000250 HC RX REV CODE- 250: Performed by: HOSPITALIST

## 2017-03-21 PROCEDURE — 74011250636 HC RX REV CODE- 250/636: Performed by: HOSPITALIST

## 2017-03-21 PROCEDURE — 83036 HEMOGLOBIN GLYCOSYLATED A1C: CPT | Performed by: HOSPITALIST

## 2017-03-21 PROCEDURE — 80048 BASIC METABOLIC PNL TOTAL CA: CPT | Performed by: INTERNAL MEDICINE

## 2017-03-21 PROCEDURE — 77010033678 HC OXYGEN DAILY

## 2017-03-21 PROCEDURE — 74011250636 HC RX REV CODE- 250/636: Performed by: FAMILY MEDICINE

## 2017-03-21 PROCEDURE — 87804 INFLUENZA ASSAY W/OPTIC: CPT | Performed by: HOSPITALIST

## 2017-03-21 PROCEDURE — 36415 COLL VENOUS BLD VENIPUNCTURE: CPT | Performed by: INTERNAL MEDICINE

## 2017-03-21 RX ORDER — PANTOPRAZOLE SODIUM 40 MG/1
40 TABLET, DELAYED RELEASE ORAL
Status: DISCONTINUED | OUTPATIENT
Start: 2017-03-22 | End: 2017-03-26 | Stop reason: HOSPADM

## 2017-03-21 RX ORDER — DEXTROSE 50 % IN WATER (D50W) INTRAVENOUS SYRINGE
25-50 AS NEEDED
Status: DISCONTINUED | OUTPATIENT
Start: 2017-03-21 | End: 2017-03-26 | Stop reason: HOSPADM

## 2017-03-21 RX ORDER — ARFORMOTEROL TARTRATE 15 UG/2ML
15 SOLUTION RESPIRATORY (INHALATION)
Status: DISCONTINUED | OUTPATIENT
Start: 2017-03-21 | End: 2017-03-26 | Stop reason: HOSPADM

## 2017-03-21 RX ORDER — INSULIN LISPRO 100 [IU]/ML
INJECTION, SOLUTION INTRAVENOUS; SUBCUTANEOUS
Status: DISCONTINUED | OUTPATIENT
Start: 2017-03-21 | End: 2017-03-26 | Stop reason: HOSPADM

## 2017-03-21 RX ORDER — IPRATROPIUM BROMIDE AND ALBUTEROL SULFATE 2.5; .5 MG/3ML; MG/3ML
3 SOLUTION RESPIRATORY (INHALATION)
Status: DISCONTINUED | OUTPATIENT
Start: 2017-03-21 | End: 2017-03-26 | Stop reason: HOSPADM

## 2017-03-21 RX ORDER — CLONAZEPAM 0.5 MG/1
0.5 TABLET ORAL 3 TIMES DAILY
Status: DISCONTINUED | OUTPATIENT
Start: 2017-03-21 | End: 2017-03-26 | Stop reason: HOSPADM

## 2017-03-21 RX ORDER — LEVOFLOXACIN 5 MG/ML
500 INJECTION, SOLUTION INTRAVENOUS EVERY 24 HOURS
Status: DISCONTINUED | OUTPATIENT
Start: 2017-03-21 | End: 2017-03-26 | Stop reason: HOSPADM

## 2017-03-21 RX ORDER — GUAIFENESIN 600 MG/1
600 TABLET, EXTENDED RELEASE ORAL EVERY 12 HOURS
Status: DISCONTINUED | OUTPATIENT
Start: 2017-03-21 | End: 2017-03-21

## 2017-03-21 RX ORDER — DRONABINOL 2.5 MG/1
2.5 CAPSULE ORAL
Status: DISCONTINUED | OUTPATIENT
Start: 2017-03-21 | End: 2017-03-26 | Stop reason: HOSPADM

## 2017-03-21 RX ORDER — MONTELUKAST SODIUM 10 MG/1
10 TABLET ORAL
Status: DISCONTINUED | OUTPATIENT
Start: 2017-03-21 | End: 2017-03-26 | Stop reason: HOSPADM

## 2017-03-21 RX ORDER — MAGNESIUM SULFATE 100 %
4 CRYSTALS MISCELLANEOUS AS NEEDED
Status: DISCONTINUED | OUTPATIENT
Start: 2017-03-21 | End: 2017-03-26 | Stop reason: HOSPADM

## 2017-03-21 RX ORDER — BUDESONIDE 0.5 MG/2ML
500 INHALANT ORAL
Status: DISCONTINUED | OUTPATIENT
Start: 2017-03-21 | End: 2017-03-26 | Stop reason: HOSPADM

## 2017-03-21 RX ORDER — KETOROLAC TROMETHAMINE 15 MG/ML
15 INJECTION, SOLUTION INTRAMUSCULAR; INTRAVENOUS EVERY 6 HOURS
Status: DISCONTINUED | OUTPATIENT
Start: 2017-03-21 | End: 2017-03-21

## 2017-03-21 RX ORDER — ACETYLCYSTEINE 100 MG/ML
2 SOLUTION ORAL; RESPIRATORY (INHALATION)
Status: DISCONTINUED | OUTPATIENT
Start: 2017-03-21 | End: 2017-03-25

## 2017-03-21 RX ADMIN — BUDESONIDE 500 MCG: 0.5 INHALANT RESPIRATORY (INHALATION) at 20:25

## 2017-03-21 RX ADMIN — ACETYLCYSTEINE 200 MG: 100 SOLUTION ORAL; RESPIRATORY (INHALATION) at 20:04

## 2017-03-21 RX ADMIN — KETOROLAC TROMETHAMINE 15 MG: 15 INJECTION, SOLUTION INTRAMUSCULAR; INTRAVENOUS at 04:06

## 2017-03-21 RX ADMIN — INSULIN LISPRO 2 UNITS: 100 INJECTION, SOLUTION INTRAVENOUS; SUBCUTANEOUS at 11:57

## 2017-03-21 RX ADMIN — ALBUTEROL SULFATE 2.5 MG: 2.5 SOLUTION RESPIRATORY (INHALATION) at 13:06

## 2017-03-21 RX ADMIN — INSULIN LISPRO 2 UNITS: 100 INJECTION, SOLUTION INTRAVENOUS; SUBCUTANEOUS at 17:38

## 2017-03-21 RX ADMIN — METHYLPREDNISOLONE SODIUM SUCCINATE 125 MG: 125 INJECTION, POWDER, FOR SOLUTION INTRAMUSCULAR; INTRAVENOUS at 05:25

## 2017-03-21 RX ADMIN — ALBUTEROL SULFATE 2.5 MG: 2.5 SOLUTION RESPIRATORY (INHALATION) at 04:34

## 2017-03-21 RX ADMIN — ACETYLCYSTEINE 200 MG: 100 SOLUTION ORAL; RESPIRATORY (INHALATION) at 15:09

## 2017-03-21 RX ADMIN — DORNASE ALFA 2.5 MG: 1 SOLUTION RESPIRATORY (INHALATION) at 20:42

## 2017-03-21 RX ADMIN — CLONAZEPAM 0.5 MG: 0.5 TABLET ORAL at 21:36

## 2017-03-21 RX ADMIN — CLONAZEPAM 0.5 MG: 0.5 TABLET ORAL at 15:23

## 2017-03-21 RX ADMIN — IPRATROPIUM BROMIDE AND ALBUTEROL SULFATE 3 ML: .5; 3 SOLUTION RESPIRATORY (INHALATION) at 11:02

## 2017-03-21 RX ADMIN — ALBUTEROL SULFATE 2.5 MG: 2.5 SOLUTION RESPIRATORY (INHALATION) at 20:04

## 2017-03-21 RX ADMIN — BUDESONIDE 500 MCG: 0.5 INHALANT RESPIRATORY (INHALATION) at 11:02

## 2017-03-21 RX ADMIN — IPRATROPIUM BROMIDE AND ALBUTEROL SULFATE 3 ML: .5; 3 SOLUTION RESPIRATORY (INHALATION) at 23:34

## 2017-03-21 RX ADMIN — ALBUTEROL SULFATE 2.5 MG: 2.5 SOLUTION RESPIRATORY (INHALATION) at 15:09

## 2017-03-21 RX ADMIN — LEVOFLOXACIN 500 MG: 5 INJECTION, SOLUTION INTRAVENOUS at 11:57

## 2017-03-21 RX ADMIN — KETOROLAC TROMETHAMINE 15 MG: 15 INJECTION, SOLUTION INTRAMUSCULAR; INTRAVENOUS at 09:33

## 2017-03-21 RX ADMIN — IPRATROPIUM BROMIDE AND ALBUTEROL SULFATE 3 ML: .5; 3 SOLUTION RESPIRATORY (INHALATION) at 07:32

## 2017-03-21 RX ADMIN — GUAIFENESIN AND CODEINE PHOSPHATE 10 ML: 100; 10 SOLUTION ORAL at 03:23

## 2017-03-21 RX ADMIN — ENOXAPARIN SODIUM 40 MG: 40 INJECTION SUBCUTANEOUS at 17:38

## 2017-03-21 RX ADMIN — GUAIFENESIN AND CODEINE PHOSPHATE 10 ML: 100; 10 SOLUTION ORAL at 08:46

## 2017-03-21 RX ADMIN — GUAIFENESIN 600 MG: 600 TABLET, EXTENDED RELEASE ORAL at 11:56

## 2017-03-21 RX ADMIN — DRONABINOL 2.5 MG: 2.5 CAPSULE ORAL at 15:22

## 2017-03-21 RX ADMIN — ARFORMOTEROL TARTRATE 15 MCG: 15 SOLUTION RESPIRATORY (INHALATION) at 20:25

## 2017-03-21 RX ADMIN — ARFORMOTEROL TARTRATE 15 MCG: 15 SOLUTION RESPIRATORY (INHALATION) at 11:02

## 2017-03-21 RX ADMIN — MONTELUKAST SODIUM 10 MG: 10 TABLET, FILM COATED ORAL at 21:36

## 2017-03-21 RX ADMIN — METHYLPREDNISOLONE SODIUM SUCCINATE 60 MG: 125 INJECTION, POWDER, FOR SOLUTION INTRAMUSCULAR; INTRAVENOUS at 17:39

## 2017-03-21 RX ADMIN — DORNASE ALFA 2.5 MG: 1 SOLUTION RESPIRATORY (INHALATION) at 15:55

## 2017-03-21 RX ADMIN — GUAIFENESIN AND CODEINE PHOSPHATE 10 ML: 100; 10 SOLUTION ORAL at 21:40

## 2017-03-21 RX ADMIN — METHYLPREDNISOLONE SODIUM SUCCINATE 125 MG: 125 INJECTION, POWDER, FOR SOLUTION INTRAMUSCULAR; INTRAVENOUS at 11:57

## 2017-03-21 NOTE — PROGRESS NOTES
Bedside and Verbal shift change report given to SONIDO Mccauley RN (oncoming nurse) by Yasmine Reed RN (offgoing nurse). Report included the following information SBAR, Kardex, ED Summary, Procedure Summary, Intake/Output, MAR, Accordion, Recent Results and Med Rec Status.

## 2017-03-21 NOTE — PROGRESS NOTES
Readmission Risk Assessment:     Moderate Risk and MSSP/Good Help ACO patients    RRAT Score:  13-20    Initial Assessment:  Chart reviewed pt admitted for asthma exacerbation,cm will remain available and cont to assess needs for discharge. Emergency Contact:  See chart    Pertinent Medical Hx: see chart      PCP/Specialists: Irene Beaulieu    Community Services:       DME:          Moderate Risk Care Transition Plan:  1. Evaluate for [facility], SNF, acute rehab, community care coordination of resources. 2. Involve patient/caregiver in assessment, planning, education and implement of intervention. 3. CM daily patient care huddles/interdisciplinary rounds. 4. PCP/Specialist appointment within 5  7 days made prior to discharge. 5. Facilitate transportation and logistics for follow-up appointments. 6. Medication reconciliation [address]  7. Formal handoff between hospital provider and post-acute provider to transition patient  Handoff to [address] [facility] Nurse Navigator or PCP practice.

## 2017-03-21 NOTE — PROGRESS NOTES
PATIENT WAS ACUTELY SOB WHEN THERAPIST ARRIVED TO ADMINISTER [address]. GAVE PATIENT DUONEB, BROVANA, AND PULMICORT. DIFFUSE WHEEZING NOTED PRE AND POST NEB. MINIMAL RELIEF WITH TX'S. ADVISED HER THAT I CONTACTED DR. SOSA TO REQUEST A PULMONARY CONSULT GIVEN PATIENT'S HX OF INTUBATION AND ICU ADMISSIONS. WILL MONITOR CLOSELY AND OFFER PRN NEBS. PATIENT STATES THAT SHE FEELS LIKE SHE NEEDS TX'S EVERY HOUR.

## 2017-03-21 NOTE — PROGRESS NOTES
TRANSFER - IN REPORT:    Verbal report received from [place] RN(name) on Amilcar Harmon  being received from 3N(unit) for routine progression of care      Report consisted of patients Situation, Background, Assessment and   Recommendations(SBAR). Information from the following report(s) SBAR, Kardex, ED Summary, Procedure Summary, Intake/Output, MAR, Accordion, Recent Results and Med Rec Status was reviewed with the receiving nurse. Opportunity for questions and clarification was provided. Assessment completed upon patients arrival to unit and care assumed. 1711 Given bedside report in the ED from [address] [facility], [address] [facility]    1722 Pt arrived via stretcher from the ED, states that she felt a little better after \"14 nebulizer treatments and magnesium\" Pt having some pain in her rib from coughing, MD made aware, recommended that pt use pillow or folded up blanket to splint on left side when she coughs, given IS - independent in use. O2 sats 100% however visibly pt looks like she is having trouble breathing given 2L NC     1820 Pt states that she feels \"somewhat better\" after splinting    1900 Given Magnesium Sulfate, order for one time dose of Toradol was offered to pt but she wants to see how she feels after Magnesium since she said \"it helped a lot downstairs\"    Shift Summary- Shift uneventful. Pt had expiratory and inspiratory wheezing audible, O2 sats remained 100%, given 2L NC to help with discomfort of coughing for long periods of time, pt  to bring in mouth piece that she uses instead of CPAP machine.  She states that she hasn't used CPAP since November 2016

## 2017-03-21 NOTE — CONSULTS
[facility]  Pulmonary, Critical Care, and Sleep Medicine    Initial Patient Consult    Name: Maria Elena Murdock MRN: [id number]   : 1960 Hospital: [facility] [place]   Date: 3/21/2017        Subjective: This patient has been seen and evaluated at the request of Dr. Brandi Mckeon for acute asthma exacerbation. Patient is a 55 y.o. female with multiple medical problems listed below presented with s/o nonproductive cough and severe wheezing. No relief with bronchodilators , presented to ED yesterday and was admitted with asthma exacerbation. Pt had similar episodes in 2016. At that time I thought patient may have severe anxiety disorder and VCD in addition to asthma. Pt recalls converstation. Pt has been on singulair as out patient. Pt has anxiety disorder currently on lexapro. No fever or chills. No n/v/d. Able to tolerate po. Had breakfast and lunch without difficulty. Past Medical History:   Diagnosis Date    Arthritis     Asthma     Chronic kidney disease     deformed right  kidney    Diabetes ([address]     Hypertension     Migraines     PUD (peptic ulcer disease)     H/O, no meds at this time     Seizures ([address]     Unspecified sleep apnea     uses cpap      Past Surgical History:   Procedure Laterality Date    HX APPENDECTOMY      HX GYN      hysterectomy      Prior to Admission medications    Medication Sig Start Date End Date Taking? Authorizing Provider   montelukast (SINGULAIR) 10 mg tablet Take 10 mg by mouth daily. Yes Historical Provider   naproxen (NAPROSYN) 500 mg tablet Take 500 mg by mouth two (2) times daily as needed. Yes Historical Provider   ondansetron (ZOFRAN ODT) 4 mg disintegrating tablet Take 4 mg by mouth every eight (8) hours as needed for Nausea. Yes Tanesha Acuña MD   raNITIdine (ZANTAC) 150 mg tablet Take 150 mg by mouth two (2) times a day.    Yes Tanesha Acuña MD   conjugated estrogens (PREMARIN) 0.9 mg tablet Take 0.9 mg by mouth daily. Yes Braulio Shepard MD   fluticasone-salmeterol (ADVAIR) 250-50 mcg/dose diskus inhaler Take 1 Puff by inhalation two (2) times a day. 11/9/16  Yes Álvaro Stahl MD   albuterol (PROVENTIL VENTOLIN) 2.5 mg /3 mL (0.083 %) nebulizer solution 3 mL by Nebulization route three (3) times daily. 11/9/16  Yes Álvaro Stahl MD   albuterol [facility] HFA) 90 mcg/actuation inhaler Take 2 Puffs by inhalation every four (4) hours as needed for Wheezing. Yes Historical Provider   escitalopram oxalate (LEXAPRO) 10 mg tablet Take 15 mg by mouth daily. Yes Historical Provider   aspirin delayed-release 81 mg tablet Take 81 mg by mouth daily. Yes Historical Provider   levETIRAcetam (KEPPRA) 500 mg tablet Take 500 mg by mouth two (2) times a day. Yes Kirk Ryan MD   losartan-hydrochlorothiazide (HYZAAR) 100-25 mg per tablet Take 1 Tab by mouth daily. Yes Kirk Ryan MD   albuterol (PROVENTIL, VENTOLIN) 90 mcg/actuation inhaler Take 1-2 Puffs by inhalation every four (4) hours as needed for Wheezing. 12/15/12  Yes Lupe Moore MD   OXcarbazepine (TRILEPTAL) 300 mg tablet Take 300 mg by mouth two (2) times a day. Yes Kirk Ryan MD   acetaminophen (TYLENOL) 500 mg tablet Take 1,000 mg by mouth every six (6) hours as needed for Pain or Fever. Braulio Shepard MD   SUMAtriptan succinate 6 mg/0.5 mL kit 6 mg by SubCUTAneous route once as needed for Migraine.     Kirk Ryan MD     Allergies   Allergen Reactions    Morphine Itching     Can tolerate if premed w/ benadryl    Pertussis Vaccine,Adsorbed Swelling     Swelling at injection site    Vicodin [Hydrocodone-Acetaminophen] Itching     Can tolerate if premed w/ benadryl      Social History   Substance Use Topics    Smoking status: Never Smoker    Smokeless tobacco: Not on file    Alcohol use No      Family History   Problem Relation Age of Onset    Malignant Hyperthermia Neg Hx     Pseudocholinesterase Deficiency Neg Hx     Delayed Awakening Neg Hx     Post-op Nausea/Vomiting Neg Hx     Emergence Delirium Neg Hx     Post-op Cognitive Dysfunction Neg Hx     Other Neg Hx         Current Facility-Administered Medications   Medication Dose Route Frequency    levoFLOXacin (LEVAQUIN) 500 mg in D5W IVPB  500 mg IntraVENous Q24H    arformoterol (BROVANA) neb solution 15 mcg  15 mcg Nebulization BID RT    budesonide (PULMICORT) 500 mcg/2 ml nebulizer suspension  500 mcg Nebulization BID RT    insulin lispro (HUMALOG) injection   SubCUTAneous AC&HS    [START ON 3/22/2017] pantoprazole (PROTONIX) tablet 40 mg  40 mg Oral ACB    dornase alpha (PULMOZYME)2.5mg/2.5ml nebulizer solution  2.5 mg Nebulization BID RT    methylPREDNISolone (PF) (SOLU-MEDROL) injection 60 mg  60 mg IntraVENous Q6H    acetylcysteine (MUCOMYST) 100 mg/mL (10 %) nebulizer solution 200 mg  2 mL Nebulization TID RT    montelukast (SINGULAIR) tablet 10 mg  10 mg Oral QHS    dronabinol (MARINOL) capsule 2.5 mg  2.5 mg Oral ACB&D    clonazePAM (KlonoPIN) tablet 0.5 mg  0.5 mg Oral TID    enoxaparin (LOVENOX) injection 40 mg  40 mg SubCUTAneous Q24H       Review of Systems:  HEENT: No epistaxis, no nasal drainage, no difficulty in swallowing  Respiratory: as above  Cardiovascular: no chest pain, no palpitations, no chronic leg edema, no syncope  Gastrointestinal: no abd pain, no vomiting, no diarrhea, no bleeding symptoms  Genitourinary: No urinary symptoms  Integument/breast: No ulcers  Musculoskeletal:Neg  Neurological: No focal weakness, no seizures, no headaches  Behvioral/Psych: No anxiety, no depression  Constitutional: No fever, no chills, no weight loss, no night sweats       Objective:   Vital Signs:    Visit Vitals    /60    Pulse 84    Temp 98 °F (36.7 °C)    Resp 20    Ht 5' 2\" (1.575 m)    Wt 79.4 kg (175 lb)    SpO2 98%    BMI 32.01 kg/m2       O2 Device: Nasal cannula   O2 Flow Rate (L/min): 2 l/min   Temp (24hrs), Av.4 °F (36.9 °C), Min:98 °F (36.7 °C), Max:98.8 °F (37.1 °C)       Intake/Output:     Intake/Output Summary (Last 24 hours) at 03/21/17 1403  Last data filed at 03/21/17 0411   Gross per 24 hour   Intake              460 ml   Output              400 ml   Net               60 ml         Physical Exam:   General: anxious,  HEENT;  PERRLA  Neck: No adenopathy or thyroid swelling  CVS: S1S2 no murmurs  RS: Mod AE bilaterally,  Good excursion, scatter wheezing,  Much of the wheezing is coming for neck/vocal cord. Abd: soft, non tender, no hepatosplenomegaly  Neuro: non focal, awake, alert  Extrm: no leg edema   Skin: no rash    Data review:   Labs:  Recent Labs      03/20/17   1213   WBC  4.1*   HGB  12.5   HCT  37.0   PLT  336     Recent Labs      03/21/17   0505  03/20/17   1213   NA  140  145   K  3.5  3.9   CL  104  107   CO2  20*  25   GLU  180*  116*   BUN  11  13   CREA  1.06  0.97   CA  8.7  9.2     No results for input(s): PH, PCO2, PO2, HCO3, FIO2 in the last 72 hours. Imaging:  I have personally reviewed the patients radiographs and have reviewed the reports: Allergies        Unspecified: Morphine;  Pertussis Vaccine,adsorbed;  Vicodin [Hydrocodone-acetaminophen]       Result Information      Status Provider Status        Final result (Exam End: 3/20/2017 12:48 PM) Open        Study Result      EXAM:Chest X-Ray      History: Shortness of breath     Technique: Portable Frontal View     Comparison: 12/23/2016, 11/7/2016     _______________     FINDINGS:  Nonspecific mild to moderate pulmonary hypoinflation degraded examination. The trachea is midline. The cardiomediastinal silhouette is midline and, within normal limits. Patchy left infrahilar opacity partially obscuring the distal left heart border,  with retained left diaphragmatic margin. Nonspecific right hemithoracic  interstitial prominence. No pneumothorax or effusion. Intact osseous structures.     _______________     IMPRESSION  IMPRESSION:     1. Pulmonary hypoinflation degraded examination with asymmetric nonspecific  infrahilar left lower lung opacity, which may represent atelectasis/pneumonia.                   IMPRESSION:   · Acute asthma exacerbation  · VCD   · Severe anxiety disorder, further exacerbated with excessive B2 agonist  · Sleep apnea on cpap at night  · ckd per hx      RECOMMENDATIONS:   · brovana and pulmicort  · Decrease the steroid  · Prn duoneb  · Add singulair  · Will start the pulmozyme and mucomyst neb  · Trial of marinol and klonopin for anxiety  · Will benefit from developing relaxaxtion techniques including breathing exercise  · DVT, PUD prophylaxis   Discussed with patient and   Thank you for the consultation       Rishabh Espino MD

## 2017-03-21 NOTE — ROUTINE PROCESS
Bedside and Verbal shift change report given to LAUREL Stafford by Simran Wynne. Report included the following information SBAR, Kardex, OR Summary, Intake/Output and MAR.

## 2017-03-21 NOTE — PROGRESS NOTES
2015 - Bedside report received from [place], [facility]. Patient in bed. Pain 6/10.     2033 - Patient in bed at this time. IV to R AC  intact and patent.  + CMS. Pt A & O x 4. LS with wheezes, severe SOB, Resp Therapist just gave Txs, on 2 LPM of O2. . NP cough noted. Abdomen soft, NT and ND. + BS to all 4 quadrants. Denies nausea. Pain 6/10. Call light within reach. Medications given. Potential side effects explained to patient, patient verbalizes understanding, opportunities for questions provided. Patient stable, No apparent distress at this time, bed in locked position, call bell and phone within reach. 2340- Solumedrol given. Potential side effects explained to patient, patient verbalizes understanding, opportunities for questions provided. Patient stable, No apparent distress at this time, bed in locked position, call bell and phone within reach. 0323-Robitussin given. Potential side effects explained to patient, patient verbalizes understanding, opportunities for questions provided. Patient stable, No apparent distress at this time, bed in locked position, call bell and phone within reach. 36- Hospitalist paged as pt has no PRN pain meds, L rib cage pain at 6/10.    0350- Call back from Dr Omayra Chahal, order obtained for ketoralac PRN. 0410-Toradol given. Potential side effects explained to patient, patient verbalizes understanding, opportunities for questions provided. Patient stable, No apparent distress at this time, bed in locked position, call bell and phone within reach. Pt had uneventful shift. Pt's breathing better now. Pain remained well-controlled with medication. No issues/concerns at this time.  Call bell within reach

## 2017-03-21 NOTE — PROGRESS NOTES
Hospitalist Progress Note-critical care note     Patient: Jareth Ordoñez MRN: [id number]  CSN: [id number]    YOB: 1960  Age: 55 y.o. Sex: female    DOA: 3/20/2017 LOS:  LOS: 1 day            Chief complaint: asthma exacerbation pna     Assessment/Plan         Patient Active Problem List   Diagnosis Code    Screen for colon cancer Z12.11    Acute respiratory insufficiency (HCC) R06.89    Asthma exacerbation J45. 901    Tachycardia R00.0    HTN (hypertension) I10    Pneumonia J18.9    Hypokalemia E87.6    OPAL (acute kidney injury) (HCC) N17.9       1. Acute asthma exacerbation. Sob not improving, continue steroid , add pulmicort and brovana , case discussed with Dr. Gerson Finney. She  Has hx of intubated   Add ssi for glucose control   2. PNA   From chest x-ray , add levoquin   3. Migraine HA/  4 Depression. No si and HI   5 HTN   Continue home meds     Subjective; sob not improving     Nurse; family has questions     was at the bedside. All questions have been answered. 35 total min's spent on patient care including >50% on counseling/coordinating care. Discussed the above assessments. also discussed labs, medications and hospital course    Review of systems:    General: No fevers or chills. Cardiovascular: No chest pain or pressure. No palpitations. Pulmonary:  shortness of breath and cough   Gastrointestinal: No nausea, vomiting. Vital signs/Intake and Output:  Visit Vitals    /81    Pulse 93    Temp 98.2 °F (36.8 °C)    Resp 20    Ht 5' 2\" (1.575 m)    Wt 79.4 kg (175 lb)    SpO2 98%    BMI 32.01 kg/m2     Current Shift:     Last three shifts:  03/19 1901 - 03/21 0700  In: 26 [P.O.:460]  Out: 400 [Urine:400]    Physical Exam:  General: WD, WN. Alert, cooperative, no acute distress    HEENT: NC, Atraumatic. PERRLA, anicteric sclerae. Lungs: + Wheezing/Rhonchi/Rales. Heart:  Regular  rhythm,  No murmur, No Rubs, No Gallops  Abdomen: Soft, Non distended, Non tender.  +Bowel sounds,   Extremities: No c/c/e  Psych:   Not anxious or agitated. Neurologic:  No acute neurological deficit. Labs: Results:       Chemistry Recent Labs      03/21/17   0505  03/20/17   1213   GLU  180*  116*   NA  140  145   K  3.5  3.9   CL  104  107   CO2  20*  25   BUN  11  13   CREA  1.06  0.97   CA  8.7  9.2   AGAP  16  13   BUCR  10*  13      CBC w/Diff Recent Labs      03/20/17   1213   WBC  4.1*   RBC  4.03*   HGB  12.5   HCT  37.0   PLT  336   GRANS  45   LYMPH  48   EOS  2      Cardiac Enzymes No results for input(s): CPK, CKND1, YAMILKA in the last 72 hours. No lab exists for component: CKRMB, TROIP   Coagulation No results for input(s): PTP, INR, APTT in the last 72 hours. No lab exists for component: INREXT    Lipid Panel No results found for: CHOL, CHOLPOCT, CHOLX, CHLST, CHOLV, [id number], HDL, LDL, NLDLCT, DLDL, LDLC, DLDLP, [id number], VLDLC, VLDL, TGL, TGLX, TRIGL, [id number], TRIGP, TGLPOCT, [id number], CHHD, CHHDX   BNP No results for input(s): BNPP in the last 72 hours. Liver Enzymes No results for input(s): TP, ALB, TBIL, AP, SGOT, GPT in the last 72 hours.     No lab exists for component: DBIL   Thyroid Studies No results found for: T4, T3U, TSH, TSHEXT     Procedures/imaging: see electronic medical records for all procedures/Xrays and details which were not copied into this note but were reviewed prior to creation of Catherne Harada, MD

## 2017-03-21 NOTE — PROGRESS NOTES
PRN NEB GIVEN FOR SOB/WHEEZING. MAINTAINING SAT 98% ON 2L NC.  BS DECREASED PRIOR TO NEB TX.  TIGHT WHEEZES POST NEB. RR IN 30'S. PURSED LIP BREATHING NOTED. PULMONARY CONSULT PENDING.

## 2017-03-22 PROBLEM — R09.02 HYPOXIA: Status: ACTIVE | Noted: 2017-03-22

## 2017-03-22 PROBLEM — K59.00 CONSTIPATION: Status: ACTIVE | Noted: 2017-03-22

## 2017-03-22 LAB
GLUCOSE BLD STRIP.AUTO-MCNC: 126 MG/DL (ref 70–110)
GLUCOSE BLD STRIP.AUTO-MCNC: 136 MG/DL (ref 70–110)
GLUCOSE BLD STRIP.AUTO-MCNC: 146 MG/DL (ref 70–110)
GLUCOSE BLD STRIP.AUTO-MCNC: 155 MG/DL (ref 70–110)

## 2017-03-22 PROCEDURE — 74011250636 HC RX REV CODE- 250/636: Performed by: HOSPITALIST

## 2017-03-22 PROCEDURE — 74011250637 HC RX REV CODE- 250/637: Performed by: INTERNAL MEDICINE

## 2017-03-22 PROCEDURE — 94640 AIRWAY INHALATION TREATMENT: CPT

## 2017-03-22 PROCEDURE — 74011250636 HC RX REV CODE- 250/636: Performed by: INTERNAL MEDICINE

## 2017-03-22 PROCEDURE — 74011250637 HC RX REV CODE- 250/637: Performed by: HOSPITALIST

## 2017-03-22 PROCEDURE — 82962 GLUCOSE BLOOD TEST: CPT

## 2017-03-22 PROCEDURE — 74011000250 HC RX REV CODE- 250: Performed by: HOSPITALIST

## 2017-03-22 PROCEDURE — 74011250637 HC RX REV CODE- 250/637: Performed by: FAMILY MEDICINE

## 2017-03-22 PROCEDURE — 74011000250 HC RX REV CODE- 250: Performed by: INTERNAL MEDICINE

## 2017-03-22 PROCEDURE — 65660000000 HC RM CCU STEPDOWN

## 2017-03-22 PROCEDURE — 94760 N-INVAS EAR/PLS OXIMETRY 1: CPT

## 2017-03-22 PROCEDURE — 77010033678 HC OXYGEN DAILY

## 2017-03-22 RX ORDER — POLYETHYLENE GLYCOL 3350 17 G/17G
17 POWDER, FOR SOLUTION ORAL 2 TIMES DAILY
Status: DISCONTINUED | OUTPATIENT
Start: 2017-03-22 | End: 2017-03-25

## 2017-03-22 RX ADMIN — MONTELUKAST SODIUM 10 MG: 10 TABLET, FILM COATED ORAL at 23:58

## 2017-03-22 RX ADMIN — LEVOFLOXACIN 500 MG: 5 INJECTION, SOLUTION INTRAVENOUS at 11:01

## 2017-03-22 RX ADMIN — BUDESONIDE 500 MCG: 0.5 INHALANT RESPIRATORY (INHALATION) at 20:19

## 2017-03-22 RX ADMIN — GUAIFENESIN AND CODEINE PHOSPHATE 10 ML: 100; 10 SOLUTION ORAL at 14:20

## 2017-03-22 RX ADMIN — ALBUTEROL SULFATE 2.5 MG: 2.5 SOLUTION RESPIRATORY (INHALATION) at 13:27

## 2017-03-22 RX ADMIN — DORNASE ALFA 2.5 MG: 1 SOLUTION RESPIRATORY (INHALATION) at 08:00

## 2017-03-22 RX ADMIN — ACETYLCYSTEINE 200 MG: 100 SOLUTION ORAL; RESPIRATORY (INHALATION) at 19:57

## 2017-03-22 RX ADMIN — DORNASE ALFA 2.5 MG: 1 SOLUTION RESPIRATORY (INHALATION) at 20:47

## 2017-03-22 RX ADMIN — CLONAZEPAM 0.5 MG: 0.5 TABLET ORAL at 08:54

## 2017-03-22 RX ADMIN — DRONABINOL 2.5 MG: 2.5 CAPSULE ORAL at 18:22

## 2017-03-22 RX ADMIN — IPRATROPIUM BROMIDE AND ALBUTEROL SULFATE 3 ML: .5; 3 SOLUTION RESPIRATORY (INHALATION) at 23:14

## 2017-03-22 RX ADMIN — BUDESONIDE 500 MCG: 0.5 INHALANT RESPIRATORY (INHALATION) at 07:00

## 2017-03-22 RX ADMIN — POLYETHYLENE GLYCOL 3350 17 G: 17 POWDER, FOR SOLUTION ORAL at 12:03

## 2017-03-22 RX ADMIN — GUAIFENESIN AND CODEINE PHOSPHATE 10 ML: 100; 10 SOLUTION ORAL at 07:38

## 2017-03-22 RX ADMIN — CLONAZEPAM 0.5 MG: 0.5 TABLET ORAL at 23:58

## 2017-03-22 RX ADMIN — ALBUTEROL SULFATE 2.5 MG: 2.5 SOLUTION RESPIRATORY (INHALATION) at 19:57

## 2017-03-22 RX ADMIN — ACETYLCYSTEINE 200 MG: 100 SOLUTION ORAL; RESPIRATORY (INHALATION) at 07:00

## 2017-03-22 RX ADMIN — METHYLPREDNISOLONE SODIUM SUCCINATE 60 MG: 125 INJECTION, POWDER, FOR SOLUTION INTRAMUSCULAR; INTRAVENOUS at 17:24

## 2017-03-22 RX ADMIN — METHYLPREDNISOLONE SODIUM SUCCINATE 60 MG: 125 INJECTION, POWDER, FOR SOLUTION INTRAMUSCULAR; INTRAVENOUS at 00:29

## 2017-03-22 RX ADMIN — METHYLPREDNISOLONE SODIUM SUCCINATE 60 MG: 125 INJECTION, POWDER, FOR SOLUTION INTRAMUSCULAR; INTRAVENOUS at 12:03

## 2017-03-22 RX ADMIN — ARFORMOTEROL TARTRATE 15 MCG: 15 SOLUTION RESPIRATORY (INHALATION) at 07:00

## 2017-03-22 RX ADMIN — METHYLPREDNISOLONE SODIUM SUCCINATE 60 MG: 125 INJECTION, POWDER, FOR SOLUTION INTRAMUSCULAR; INTRAVENOUS at 07:15

## 2017-03-22 RX ADMIN — CLONAZEPAM 0.5 MG: 0.5 TABLET ORAL at 17:24

## 2017-03-22 RX ADMIN — ENOXAPARIN SODIUM 40 MG: 40 INJECTION SUBCUTANEOUS at 17:24

## 2017-03-22 RX ADMIN — GUAIFENESIN AND CODEINE PHOSPHATE 10 ML: 100; 10 SOLUTION ORAL at 18:25

## 2017-03-22 RX ADMIN — PANTOPRAZOLE SODIUM 40 MG: 40 TABLET, DELAYED RELEASE ORAL at 07:15

## 2017-03-22 RX ADMIN — POLYETHYLENE GLYCOL 3350 17 G: 17 POWDER, FOR SOLUTION ORAL at 23:58

## 2017-03-22 RX ADMIN — ACETYLCYSTEINE 200 MG: 100 SOLUTION ORAL; RESPIRATORY (INHALATION) at 13:27

## 2017-03-22 RX ADMIN — DRONABINOL 2.5 MG: 2.5 CAPSULE ORAL at 08:54

## 2017-03-22 RX ADMIN — METHYLPREDNISOLONE SODIUM SUCCINATE 60 MG: 125 INJECTION, POWDER, FOR SOLUTION INTRAMUSCULAR; INTRAVENOUS at 23:58

## 2017-03-22 RX ADMIN — ARFORMOTEROL TARTRATE 15 MCG: 15 SOLUTION RESPIRATORY (INHALATION) at 20:19

## 2017-03-22 RX ADMIN — INSULIN LISPRO 2 UNITS: 100 INJECTION, SOLUTION INTRAVENOUS; SUBCUTANEOUS at 12:03

## 2017-03-22 NOTE — PROGRESS NOTES
Assumed care; Pt resting in bed; no distress noted; Pt denies pain; bed in low position; Side rails up x 3; Call light and personal belonging within reach. Will continue to monitor.

## 2017-03-22 NOTE — PROGRESS NOTES
1955; Assumed care of pt.; Arrived to find Pt. Resting quietly with light off; No acute distressed noted; Pt. taken off contact isolation (Flu swab negative); status stable    0611: Shift Summary; Vital signs remained stable throughout shift; No C/o pain; Cardiac rhythm: NSR/ST; Intermittent scattered wheezing present to bilateral lobes, Pt. Dyspneic on exertion, Schedule nebulizer administered without difficulty; Pt. Rested quietly on CPAP majority of shift; Call bell left at reach; bed at lowest position; and wheels locked     The documentation for this period is being entered following the guidelines as defined in the [facility] downtime policy by Soo Varghese RN. From [id number]    Bedside shift change report given to Nydia (oncoming nurse) by Soo Varghese RN (offgoing nurse). Report included the following information SBAR and Kardex.

## 2017-03-22 NOTE — PROGRESS NOTES
conducted an initial consultation and Spiritual Assessment for Krystina Lozano, who is a 55 y.o.,female. Patients Primary Language is: [place]. According to the patients EMR Rastafari Affiliation is: [facility].     The reason the Patient came to the hospital is:   Patient Active Problem List    Diagnosis Date Noted    Constipation 03/22/2017    Hypoxia 03/22/2017    Asthma exacerbation 11/01/2016    Tachycardia 11/01/2016    HTN (hypertension) 11/01/2016    Pneumonia 11/01/2016    Hypokalemia 11/01/2016    OPAL (acute kidney injury) ([address] 11/01/2016    Acute respiratory insufficiency ([address] 10/31/2016    Screen for colon cancer 12/20/2012        The  provided the following Interventions:  Initiated a relationship of care and support. Patient was not talkative and was not feeling well. She had two visitors. Explored issues of kobe, belief, spirituality and Rastafarian/ritual needs while hospitalized. She expressed kobe in God and hope for healing. Listened empathically. Provided chaplaincy education. Provided information about Spiritual Care Services. Offered assurance of continued prayers on patient's behalf. Chart reviewed. The following outcomes were achieved:  Patient shared limited information about both their medical narrative and spiritual journey/beliefs. Patient and friends expressed gratitude for the 's visit. Assessment:  Patient does not have any Rastafarian/cultural needs that will affect patients preferences in health care. Patient did not indicate any spiritual or Rastafarian issues which require Spiritual Care Services interventions at this time. Plan:  Chaplains will continue to follow and will provide pastoral care on an as needed/requested basis.  recommends bedside caregivers page  on duty if patient shows signs of acute spiritual or emotional distress. César Cabrera MDiv.   Board Certified [employer] [phone]

## 2017-03-22 NOTE — PROGRESS NOTES
Hospitalist Progress Note-critical care note     Patient: Maria Elena Murdock MRN: [id number]  CSN: [id number]    YOB: 1960  Age: 55 y.o. Sex: female    DOA: 3/20/2017 LOS:  LOS: 2 days            Chief complaint: asthma exacerbation pna ,anxiety ,constipation     Assessment/Plan         Patient Active Problem List   Diagnosis Code    Screen for colon cancer Z12.11    Acute respiratory insufficiency (HCC) R06.89    Asthma exacerbation J45. 901    Tachycardia R00.0    HTN (hypertension) I10    Pneumonia J18.9    Hypokalemia E87.6    OPAL (acute kidney injury) (HCC) N17.9       1. Acute asthma exacerbation. Improving. , continue steroid , add pulmicort and brovana , appreciated  Dr. Linh Carlin on board . hx of intubated. On  ssi for glucose control. 2. PNA   From chest x-ray , add levoquin   3. Migraine HA  4 Depression. No si and HI   5 HTN   Continue home meds   6 anxiety   Controlled oer Klonopin   7 constipation   8 hypoxia   Wean off nc as tolerated    Last BM last Saturday, miralax   Subjective; sob improving. Nurse; family has questions    Will continue wean off nc o2      Review of systems:    General: No fevers or chills. Cardiovascular: No chest pain or pressure. No palpitations. Pulmonary:  shortness of breath better, still  cough   Gastrointestinal: No nausea, vomiting. Constipation     Vital signs/Intake and Output:  Visit Vitals    /90 (BP 1 Location: Right arm, BP Patient Position: At rest)    Pulse 92    Temp 97.9 °F (36.6 °C)    Resp 18    Ht 5' 2\" (1.575 m)    Wt 80.3 kg (177 lb 0.5 oz)    SpO2 97%    BMI 32.38 kg/m2     Current Shift:     Last three shifts:  03/20 1901 - 03/22 0700  In: 940 [P.O.:940]  Out: 400 [Urine:400]    Physical Exam:  General: WD, WN. Alert, cooperative, no acute distress    HEENT: NC, Atraumatic. PERRLA, anicteric sclerae. Lungs: + Wheezing/Rhonchi/Rales.   Heart:  Regular  rhythm,  No murmur, No Rubs, No Gallops  Abdomen: Soft, Non distended, Non tender.  +Bowel sounds,   Extremities: No c/c/e  Psych:   Not anxious or agitated. Neurologic:  No acute neurological deficit. Labs: Results:       Chemistry Recent Labs      03/21/17   0505  03/20/17   1213   GLU  180*  116*   NA  140  145   K  3.5  3.9   CL  104  107   CO2  20*  25   BUN  11  13   CREA  1.06  0.97   CA  8.7  9.2   AGAP  16  13   BUCR  10*  13      CBC w/Diff Recent Labs      03/20/17   1213   WBC  4.1*   RBC  4.03*   HGB  12.5   HCT  37.0   PLT  336   GRANS  45   LYMPH  48   EOS  2      Cardiac Enzymes No results for input(s): CPK, CKND1, YAMILKA in the last 72 hours. No lab exists for component: CKRMB, TROIP   Coagulation No results for input(s): PTP, INR, APTT in the last 72 hours. No lab exists for component: INREXT, INREXT    Lipid Panel No results found for: CHOL, CHOLPOCT, CHOLX, CHLST, CHOLV, [id number], HDL, LDL, NLDLCT, DLDL, LDLC, DLDLP, [id number], VLDLC, VLDL, TGL, TGLX, TRIGL, [id number], TRIGP, TGLPOCT, [id number], CHHD, CHHDX   BNP No results for input(s): BNPP in the last 72 hours. Liver Enzymes No results for input(s): TP, ALB, TBIL, AP, SGOT, GPT in the last 72 hours.     No lab exists for component: DBIL   Thyroid Studies No results found for: T4, T3U, TSH, TSHEXT, TSHEXT     Procedures/imaging: see electronic medical records for all procedures/Xrays and details which were not copied into this note but were reviewed prior to creation of Willard Paz MD

## 2017-03-22 NOTE — PROGRESS NOTES
[facility]  Pulmonary, Critical Care, and Sleep Medicine         Name: Bay Ureña MRN: [id number]   : 1960 Hospital: [facility] [place]   Date: 3/22/2017        Subjective:   3/22  Slept well  Feels better. Still coughing. Tolerating PO  HD stable. Less anxious  \  This patient has been seen and evaluated at the request of Dr. Gildardo Gomes for acute asthma exacerbation. Patient is a 55 y.o. female with multiple medical problems listed below presented with s/o nonproductive cough and severe wheezing. No relief with bronchodilators , presented to ED yesterday and was admitted with asthma exacerbation. Pt had similar episodes in 2016. At that time I thought patient may have severe anxiety disorder and VCD in addition to asthma. Pt recalls converstation. Pt has been on singulair as out patient. Pt has anxiety disorder currently on lexapro. No fever or chills. No n/v/d. Able to tolerate po. Had breakfast and lunch without difficulty. Past Medical History:   Diagnosis Date    Arthritis     Asthma     Chronic kidney disease     deformed right  kidney    Diabetes ([address]     Hypertension     Migraines     PUD (peptic ulcer disease)     H/O, no meds at this time     Seizures ([address]     Unspecified sleep apnea     uses cpap      Past Surgical History:   Procedure Laterality Date    HX APPENDECTOMY      HX GYN      hysterectomy      Prior to Admission medications    Medication Sig Start Date End Date Taking? Authorizing Provider   montelukast (SINGULAIR) 10 mg tablet Take 10 mg by mouth daily. Yes Historical Provider   naproxen (NAPROSYN) 500 mg tablet Take 500 mg by mouth two (2) times daily as needed. Yes Historical Provider   ondansetron (ZOFRAN ODT) 4 mg disintegrating tablet Take 4 mg by mouth every eight (8) hours as needed for Nausea. Yes Donta Giron MD   raNITIdine (ZANTAC) 150 mg tablet Take 150 mg by mouth two (2) times a day.    Yes Casper Azar Alfred Torres MD   conjugated estrogens (PREMARIN) 0.9 mg tablet Take 0.9 mg by mouth daily. Yes Rhys Joya MD   fluticasone-salmeterol (ADVAIR) 250-50 mcg/dose diskus inhaler Take 1 Puff by inhalation two (2) times a day. 11/9/16  Yes Michelle Pugh MD   albuterol (PROVENTIL VENTOLIN) 2.5 mg /3 mL (0.083 %) nebulizer solution 3 mL by Nebulization route three (3) times daily. 11/9/16  Yes Michelle Pugh MD   albuterol [facility] named [facility] HFA) 90 mcg/actuation inhaler Take 2 Puffs by inhalation every four (4) hours as needed for Wheezing. Yes Historical Provider   escitalopram oxalate (LEXAPRO) 10 mg tablet Take 15 mg by mouth daily. Yes Historical Provider   aspirin delayed-release 81 mg tablet Take 81 mg by mouth daily. Yes Historical Provider   levETIRAcetam (KEPPRA) 500 mg tablet Take 500 mg by mouth two (2) times a day. Yes Kirk Ryan MD   losartan-hydrochlorothiazide (HYZAAR) 100-25 mg per tablet Take 1 Tab by mouth daily. Yes Kirk Ryan MD   albuterol (PROVENTIL, VENTOLIN) 90 mcg/actuation inhaler Take 1-2 Puffs by inhalation every four (4) hours as needed for Wheezing. 12/15/12  Yes Elyssa Rodrigues MD   OXcarbazepine (TRILEPTAL) 300 mg tablet Take 300 mg by mouth two (2) times a day. Yes Kirk Ryan MD   acetaminophen (TYLENOL) 500 mg tablet Take 1,000 mg by mouth every six (6) hours as needed for Pain or Fever. Rhys Joya MD   SUMAtriptan succinate 6 mg/0.5 mL kit 6 mg by SubCUTAneous route once as needed for Migraine.     Kirk Ryan MD     Allergies   Allergen Reactions    Morphine Itching     Can tolerate if premed w/ benadryl    Pertussis Vaccine,Adsorbed Swelling     Swelling at injection site    Vicodin [Hydrocodone-Acetaminophen] Itching     Can tolerate if premed w/ benadryl      Social History   Substance Use Topics    Smoking status: Never Smoker    Smokeless tobacco: Not on file    Alcohol use No      Family History   Problem Relation Age of Onset    Malignant Hyperthermia Neg Hx     Pseudocholinesterase Deficiency Neg Hx     Delayed Awakening Neg Hx     Post-op Nausea/Vomiting Neg Hx     Emergence Delirium Neg Hx     Post-op Cognitive Dysfunction Neg Hx     Other Neg Hx         Current Facility-Administered Medications   Medication Dose Route Frequency    polyethylene glycol (MIRALAX) packet 17 g  17 g Oral BID    levoFLOXacin (LEVAQUIN) 500 mg in D5W IVPB  500 mg IntraVENous Q24H    arformoterol (BROVANA) neb solution 15 mcg  15 mcg Nebulization BID RT    budesonide (PULMICORT) 500 mcg/2 ml nebulizer suspension  500 mcg Nebulization BID RT    insulin lispro (HUMALOG) injection   SubCUTAneous AC&HS    pantoprazole (PROTONIX) tablet 40 mg  40 mg Oral ACB    dornase alpha (PULMOZYME)2.5mg/2.5ml nebulizer solution  2.5 mg Nebulization BID RT    methylPREDNISolone (PF) (SOLU-MEDROL) injection 60 mg  60 mg IntraVENous Q6H    acetylcysteine (MUCOMYST) 100 mg/mL (10 %) nebulizer solution 200 mg  2 mL Nebulization TID RT    montelukast (SINGULAIR) tablet 10 mg  10 mg Oral QHS    dronabinol (MARINOL) capsule 2.5 mg  2.5 mg Oral ACB&D    clonazePAM (KlonoPIN) tablet 0.5 mg  0.5 mg Oral TID    enoxaparin (LOVENOX) injection 40 mg  40 mg SubCUTAneous Q24H               Objective:   Vital Signs:    Visit Vitals    /90 (BP 1 Location: Right arm, BP Patient Position: At rest)    Pulse 92    Temp 97.9 °F (36.6 °C)    Resp 18    Ht 5' 2\" (1.575 m)    Wt 80.3 kg (177 lb 0.5 oz)    SpO2 97%    BMI 32.38 kg/m2       O2 Device: Room air (pt forgot to put on, wears 2L)   O2 Flow Rate (L/min): 4 l/min   Temp (24hrs), Av.1 °F (36.7 °C), Min:97.7 °F (36.5 °C), Max:98.4 °F (36.9 °C)       Intake/Output:     Intake/Output Summary (Last 24 hours) at 17 1404  Last data filed at 17 2239   Gross per 24 hour   Intake              480 ml   Output                0 ml   Net              480 ml         Physical Exam:   General: anxious,  HEENT; PERRLA  Neck: No adenopathy or thyroid swelling  CVS: S1S2 no murmurs  RS: Mod AE bilaterally,  Good excursion, scatter wheezing,  Much of the wheezing is coming for neck/vocal cord. Abd: soft, non tender, no hepatosplenomegaly  Neuro: non focal, awake, alert  Extrm: no leg edema   Skin: no rash    Data review:   Labs:  Recent Labs      03/20/17   1213   WBC  4.1*   HGB  12.5   HCT  37.0   PLT  336     Recent Labs      03/21/17   0505  03/20/17   1213   NA  140  145   K  3.5  3.9   CL  104  107   CO2  20*  25   GLU  180*  116*   BUN  11  13   CREA  1.06  0.97   CA  8.7  9.2     No results for input(s): PH, PCO2, PO2, HCO3, FIO2 in the last 72 hours. Imaging:  I have personally reviewed the patients radiographs and have reviewed the reports: Allergies        Unspecified: Morphine;  Pertussis Vaccine,adsorbed;  Vicodin [Hydrocodone-acetaminophen]       Result Information      Status Provider Status        Final result (Exam End: 3/20/2017 12:48 PM) Open        Study Result      EXAM:Chest X-Ray      History: Shortness of breath     Technique: Portable Frontal View     Comparison: 12/23/2016, 11/7/2016     _______________     FINDINGS:  Nonspecific mild to moderate pulmonary hypoinflation degraded examination. The trachea is midline. The cardiomediastinal silhouette is midline and, within normal limits. Patchy left infrahilar opacity partially obscuring the distal left heart border,  with retained left diaphragmatic margin. Nonspecific right hemithoracic  interstitial prominence. No pneumothorax or effusion.   Intact osseous structures.     _______________     IMPRESSION  IMPRESSION:     1. Pulmonary hypoinflation degraded examination with asymmetric nonspecific  infrahilar left lower lung opacity, which may represent atelectasis/pneumonia.                   IMPRESSION:   · Acute asthma exacerbation, improving  · VCD   · Severe anxiety disorder, further exacerbated with excessive B2 agonist  · Sleep apnea on cpap at night  · ckd per hx      RECOMMENDATIONS:   · brovana and pulmicort  · Decrease the steroid  · Prn duoneb  · singulair  · Continue klonopin and marinol  · DVT, PUD prophylaxis           Carly Bliss MD

## 2017-03-23 LAB
GLUCOSE BLD STRIP.AUTO-MCNC: 122 MG/DL (ref 70–110)
GLUCOSE BLD STRIP.AUTO-MCNC: 129 MG/DL (ref 70–110)
GLUCOSE BLD STRIP.AUTO-MCNC: 132 MG/DL (ref 70–110)
GLUCOSE BLD STRIP.AUTO-MCNC: 140 MG/DL (ref 70–110)

## 2017-03-23 PROCEDURE — 94640 AIRWAY INHALATION TREATMENT: CPT

## 2017-03-23 PROCEDURE — 74011000250 HC RX REV CODE- 250: Performed by: INTERNAL MEDICINE

## 2017-03-23 PROCEDURE — 74011000250 HC RX REV CODE- 250: Performed by: HOSPITALIST

## 2017-03-23 PROCEDURE — 97165 OT EVAL LOW COMPLEX 30 MIN: CPT

## 2017-03-23 PROCEDURE — 82962 GLUCOSE BLOOD TEST: CPT

## 2017-03-23 PROCEDURE — 74011250637 HC RX REV CODE- 250/637: Performed by: INTERNAL MEDICINE

## 2017-03-23 PROCEDURE — 97166 OT EVAL MOD COMPLEX 45 MIN: CPT

## 2017-03-23 PROCEDURE — 77010033678 HC OXYGEN DAILY

## 2017-03-23 PROCEDURE — 97162 PT EVAL MOD COMPLEX 30 MIN: CPT

## 2017-03-23 PROCEDURE — 74011250637 HC RX REV CODE- 250/637: Performed by: FAMILY MEDICINE

## 2017-03-23 PROCEDURE — 74011250636 HC RX REV CODE- 250/636: Performed by: HOSPITALIST

## 2017-03-23 PROCEDURE — 94760 N-INVAS EAR/PLS OXIMETRY 1: CPT

## 2017-03-23 PROCEDURE — 65270000029 HC RM PRIVATE

## 2017-03-23 PROCEDURE — 74011250636 HC RX REV CODE- 250/636: Performed by: INTERNAL MEDICINE

## 2017-03-23 PROCEDURE — 74011250637 HC RX REV CODE- 250/637: Performed by: HOSPITALIST

## 2017-03-23 RX ORDER — ASPIRIN 81 MG/1
81 TABLET ORAL DAILY
Status: DISCONTINUED | OUTPATIENT
Start: 2017-03-24 | End: 2017-03-26 | Stop reason: HOSPADM

## 2017-03-23 RX ORDER — OXCARBAZEPINE 150 MG/1
300 TABLET, FILM COATED ORAL 2 TIMES DAILY
Status: DISCONTINUED | OUTPATIENT
Start: 2017-03-23 | End: 2017-03-26 | Stop reason: HOSPADM

## 2017-03-23 RX ORDER — LOSARTAN POTASSIUM AND HYDROCHLOROTHIAZIDE 25; 100 MG/1; MG/1
1 TABLET ORAL DAILY
Status: DISCONTINUED | OUTPATIENT
Start: 2017-03-23 | End: 2017-03-23 | Stop reason: SDUPTHER

## 2017-03-23 RX ORDER — LEVETIRACETAM 500 MG/1
500 TABLET ORAL 2 TIMES DAILY
Status: DISCONTINUED | OUTPATIENT
Start: 2017-03-23 | End: 2017-03-26 | Stop reason: HOSPADM

## 2017-03-23 RX ORDER — ADHESIVE BANDAGE
30 BANDAGE TOPICAL DAILY PRN
Status: DISCONTINUED | OUTPATIENT
Start: 2017-03-23 | End: 2017-03-26 | Stop reason: HOSPADM

## 2017-03-23 RX ORDER — MONTELUKAST SODIUM 10 MG/1
10 TABLET ORAL DAILY
Status: DISCONTINUED | OUTPATIENT
Start: 2017-03-24 | End: 2017-03-23 | Stop reason: SDUPTHER

## 2017-03-23 RX ADMIN — IPRATROPIUM BROMIDE AND ALBUTEROL SULFATE 3 ML: .5; 3 SOLUTION RESPIRATORY (INHALATION) at 10:55

## 2017-03-23 RX ADMIN — GUAIFENESIN AND CODEINE PHOSPHATE 10 ML: 100; 10 SOLUTION ORAL at 07:39

## 2017-03-23 RX ADMIN — ALBUTEROL SULFATE 2.5 MG: 2.5 SOLUTION RESPIRATORY (INHALATION) at 14:59

## 2017-03-23 RX ADMIN — PANTOPRAZOLE SODIUM 40 MG: 40 TABLET, DELAYED RELEASE ORAL at 06:57

## 2017-03-23 RX ADMIN — ALBUTEROL SULFATE 2.5 MG: 2.5 SOLUTION RESPIRATORY (INHALATION) at 07:21

## 2017-03-23 RX ADMIN — HYDROCHLOROTHIAZIDE: 25 TABLET ORAL at 12:39

## 2017-03-23 RX ADMIN — GUAIFENESIN AND CODEINE PHOSPHATE 10 ML: 100; 10 SOLUTION ORAL at 00:07

## 2017-03-23 RX ADMIN — METHYLPREDNISOLONE SODIUM SUCCINATE 60 MG: 125 INJECTION, POWDER, FOR SOLUTION INTRAMUSCULAR; INTRAVENOUS at 20:22

## 2017-03-23 RX ADMIN — POLYETHYLENE GLYCOL 3350 17 G: 17 POWDER, FOR SOLUTION ORAL at 10:34

## 2017-03-23 RX ADMIN — BUDESONIDE 500 MCG: 0.5 INHALANT RESPIRATORY (INHALATION) at 19:34

## 2017-03-23 RX ADMIN — DRONABINOL 2.5 MG: 2.5 CAPSULE ORAL at 07:31

## 2017-03-23 RX ADMIN — LEVOFLOXACIN 500 MG: 5 INJECTION, SOLUTION INTRAVENOUS at 10:34

## 2017-03-23 RX ADMIN — LEVETIRACETAM 500 MG: 500 TABLET, FILM COATED ORAL at 20:27

## 2017-03-23 RX ADMIN — DORNASE ALFA 2.5 MG: 1 SOLUTION RESPIRATORY (INHALATION) at 19:33

## 2017-03-23 RX ADMIN — DRONABINOL 2.5 MG: 2.5 CAPSULE ORAL at 21:47

## 2017-03-23 RX ADMIN — BUDESONIDE 500 MCG: 0.5 INHALANT RESPIRATORY (INHALATION) at 07:15

## 2017-03-23 RX ADMIN — ACETYLCYSTEINE 200 MG: 100 SOLUTION ORAL; RESPIRATORY (INHALATION) at 19:33

## 2017-03-23 RX ADMIN — GUAIFENESIN AND CODEINE PHOSPHATE 10 ML: 100; 10 SOLUTION ORAL at 23:24

## 2017-03-23 RX ADMIN — ARFORMOTEROL TARTRATE 15 MCG: 15 SOLUTION RESPIRATORY (INHALATION) at 19:34

## 2017-03-23 RX ADMIN — METHYLPREDNISOLONE SODIUM SUCCINATE 60 MG: 125 INJECTION, POWDER, FOR SOLUTION INTRAMUSCULAR; INTRAVENOUS at 06:57

## 2017-03-23 RX ADMIN — ARFORMOTEROL TARTRATE 15 MCG: 15 SOLUTION RESPIRATORY (INHALATION) at 07:15

## 2017-03-23 RX ADMIN — ACETYLCYSTEINE 200 MG: 100 SOLUTION ORAL; RESPIRATORY (INHALATION) at 07:16

## 2017-03-23 RX ADMIN — DORNASE ALFA 2.5 MG: 1 SOLUTION RESPIRATORY (INHALATION) at 08:00

## 2017-03-23 RX ADMIN — CLONAZEPAM 0.5 MG: 0.5 TABLET ORAL at 17:21

## 2017-03-23 RX ADMIN — IPRATROPIUM BROMIDE AND ALBUTEROL SULFATE 3 ML: .5; 3 SOLUTION RESPIRATORY (INHALATION) at 23:29

## 2017-03-23 RX ADMIN — POLYETHYLENE GLYCOL 3350 17 G: 17 POWDER, FOR SOLUTION ORAL at 20:29

## 2017-03-23 RX ADMIN — CLONAZEPAM 0.5 MG: 0.5 TABLET ORAL at 21:47

## 2017-03-23 RX ADMIN — OXCARBAZEPINE 300 MG: 150 TABLET ORAL at 20:29

## 2017-03-23 RX ADMIN — ENOXAPARIN SODIUM 40 MG: 40 INJECTION SUBCUTANEOUS at 17:22

## 2017-03-23 RX ADMIN — MONTELUKAST SODIUM 10 MG: 10 TABLET, FILM COATED ORAL at 21:47

## 2017-03-23 RX ADMIN — METHYLPREDNISOLONE SODIUM SUCCINATE 60 MG: 125 INJECTION, POWDER, FOR SOLUTION INTRAMUSCULAR; INTRAVENOUS at 12:39

## 2017-03-23 RX ADMIN — ACETYLCYSTEINE 200 MG: 100 SOLUTION ORAL; RESPIRATORY (INHALATION) at 14:59

## 2017-03-23 RX ADMIN — CLONAZEPAM 0.5 MG: 0.5 TABLET ORAL at 10:33

## 2017-03-23 RX ADMIN — GUAIFENESIN AND CODEINE PHOSPHATE 10 ML: 100; 10 SOLUTION ORAL at 10:44

## 2017-03-23 NOTE — PROGRESS NOTES
Problem: Mobility Impaired (Adult and Pediatric)  Goal: *Acute Goals and Plan of Care (Insert Text)  Physical Therapy Goals  Initiated 3/23/2017 and to be accomplished within 2-8 day(s)  1. Patient will move from supine to sit and sit to supine in bed with supervision/set-up. 2. Patient will transfer from bed to chair and chair to bed with supervision/set-up using the least restrictive device. 3. Patient will perform sit to stand with supervision/set-up. 4. Patient will ambulate with supervision/set-up for 100 feet with the least restrictive device. 5. Patient will ascend/descend 3 stairs with use of handrail(s) with minimal assistance/contact guard assist.  Outcome: Progressing Towards Goal  PHYSICAL THERAPY EVALUATION     Patient: Malika Simpson (62 y.o. female)  Date: 3/23/2017  Primary Diagnosis: Asthma exacerbation        Precautions:   Fall      ASSESSMENT :  Based on the objective data described below, the patient presents with decreased functional mobility with regards to bed mobility, transfers, gait, balance, and tolerance to activity. The patient was seen with OT this date. Patient sat up to edge of bed with CGA. Patient then stood up to a rolling walker and ambulated about 50 feet with 2L O2. Patient's gait was steady initially but became slightly more unsteady as she becomes fatigued. Patient also reports feeling short of breath and dizzy. Returned the patient to seated at edge of bed. Assessed vitals. Heart rate george to 130s and SPO2 was 97% on 2L. Blood pressure was 154/104. Nursing was informed and the patient was returned to supine in bed. Will continue PT to maximize safe mobility and activity tolerance. Recommend rehab vs TBD at this time. Patient will benefit from skilled intervention to address the above impairments.   Patients rehabilitation potential is considered to be Good  Factors which may influence rehabilitation potential include:   [ ]         None noted  [ ]         Mental ability/status  [X]         Medical condition  [ ]         Home/family situation and support systems  [ ]         Safety awareness  [ ]         Pain tolerance/management  [ ]         Other:        PLAN :  Recommendations and Planned Interventions:  [X]           Bed Mobility Training             [ ]    Neuromuscular Re-Education  [X]           Transfer Training                   [ ]    Orthotic/Prosthetic Training  [X]           Gait Training                          [ ]    Modalities  [X]           Therapeutic Exercises          [ ]    Edema Management/Control  [X]           Therapeutic Activities            [X]    Patient and Family Training/Education  [ ]           Other (comment):     Frequency/Duration: Patient will be followed by physical therapy daily to address goals. Discharge Recommendations: Rehab vs TBD  Further Equipment Recommendations for Discharge: N/A       SUBJECTIVE:   Patient stated I remember you guys.       OBJECTIVE DATA SUMMARY:       Past Medical History:   Diagnosis Date    Arthritis      Asthma      Chronic kidney disease       deformed right  kidney    [facility] [address]      Hypertension      Migraines      PUD (peptic ulcer disease)       H/O, no meds at this time 2016    Seizures ([facility]      Unspecified sleep apnea       uses cpap     Past Surgical History:   Procedure Laterality Date    HX APPENDECTOMY        HX GYN         hysterectomy     Barriers to Learning/Limitations: None  Compensate with: N/A  Prior Level of Function/Home Situation: Patient lives with family in one story house with 3 steps to enter. Home Situation  Home Environment: Private residence  # Steps to Enter: 3  Rails to Enter: No  One/Two Story Residence: One story  Living Alone: No  Support Systems: Family member(s)  Patient Expects to be Discharged to[de-identified] Private residence  Current DME Used/Available at Home: martha [place] Walker, rolling  Critical Behavior:  Neurologic State: Alert; Appropriate for age  Orientation Level: Oriented X4  Cognition: Appropriate decision making; Appropriate for age attention/concentration; Appropriate safety awareness; Follows commands  Safety/Judgement: Awareness of environment; Fall prevention;Good awareness of safety precautions  Psychosocial  Patient Behaviors: Calm; Cooperative  Purposeful Interaction: Yes  Pt Identified Daily Priority: Clinical issues (comment)  Caritas Process: Nurture loving kindness; Teaching/learning;Establish trust;Attend basic human needs;Create healing environment  Caring Interventions: Reassure  Reassure: Therapeutic listening;Quiet presence;Caring rounds; Support family; Informing  Therapeutic Modalities: Intentional therapeutic touch;Humor  Strength:    Strength: Generally decreased, functional  Tone & Sensation:   Tone: Normal  Sensation: Intact     Range Of Motion:  AROM: Generally decreased, functional  PROM: Generally decreased, functional  Functional Mobility:  Bed Mobility:  Rolling: Supervision  Supine to Sit: Contact guard assistance  Sit to Supine: Contact guard assistance  Scooting: Supervision  Transfers:  Sit to Stand: Contact guard assistance  Stand to Sit: Contact guard assistance  Balance:   Sitting: Intact  Standing: Impaired; With support  Standing - Static: Good  Standing - Dynamic : Fair  Ambulation/Gait Training:  Distance (ft): 50 Feet (ft)  Assistive Device: Gait belt;Walker, rolling  Ambulation - Level of Assistance: Contact guard assistance;Minimal assistance  Gait Description (WDL): Exceptions to WDL  Gait Abnormalities: Decreased step clearance  Base of Support: Narrowed  Stance: Time  Speed/Dafne: Slow  Step Length: Left shortened;Right shortened  Swing Pattern: Right asymmetrical;Left asymmetrical     Pain:  0/10  Activity Tolerance:   Fair/poor  Please refer to the flowsheet for vital signs taken during this treatment.   After treatment:   [ ]         Patient left in no apparent distress sitting up in chair  [X]         Patient left in no apparent distress in bed  [X]         Call bell left within reach  [X]         Nursing notified  [ ]         Caregiver present  [ ]         Bed alarm activated      COMMUNICATION/EDUCATION:   [X]         Fall prevention education was provided and the patient/caregiver indicated understanding. [X]         Patient/family have participated as able in goal setting and plan of care. [X]         Patient/family agree to work toward stated goals and plan of care. [ ]         Patient understands intent and goals of therapy, but is neutral about his/her participation. [ ]         Patient is unable to participate in goal setting and plan of care. Thank you for this referral.  Didier Butler   Time Calculation: 18 mins      Eval Complexity: History: MEDIUM  Complexity : 1-2 comorbidities / personal factors will impact the outcome/ POC Exam:MEDIUM Complexity : 3 Standardized tests and measures addressing body structure, function, activity limitation and / or participation in recreation  Presentation: MEDIUM Complexity : Evolving with changing characteristics  Clinical Decision Making:Medium Complexity Patient's blood pressure was 154/104 this session. Patient exhibited significant shortness of breath and fatigue while ambulating.  Overall Complexity:MEDIUM

## 2017-03-23 NOTE — PROGRESS NOTES
Shift assessment completed. Pt in bed with visitor at bedside. A/Ox4. Pt has SOB, neb tx current. Pt has expiratory wheezing. Productive cough. Pt on 2L nasal cannula Pt denies any chest pain. Pt denies any numbness or tingling to extremities. Pt denies any calf pain.

## 2017-03-23 NOTE — ROUTINE PROCESS
Bedside shift change report given to CARLINE Davila RN (oncoming nurse) by Stefani Serrano RN  (offgoing nurse). Report included the following information SBAR, Kardex and MAR.

## 2017-03-23 NOTE — PROGRESS NOTES
Hospitalist Progress Note-critical care note     Patient: Karlos Munoz MRN: [id number]  CSN: [id number]    YOB: 1960  Age: 55 y.o. Sex: female    DOA: 3/20/2017 LOS:  LOS: 3 days            Chief complaint: asthma exacerbation pna ,anxiety ,constipation, HTn     Assessment/Plan         Patient Active Problem List   Diagnosis Code    Screen for colon cancer Z12.11    Acute respiratory insufficiency (HCC) R06.89    Asthma exacerbation J45. 901    Tachycardia R00.0    HTN (hypertension) I10    Pneumonia J18.9    Hypokalemia E87.6    [address]AL (acute [facility] injury) ([facility] [address] [facility] N17.9    Constipation K59.00    Hypoxia R09.02       1. Acute asthma exacerbation. Improving slowly. continue steroid and  pulmicort and brovana , appreciated  Dr. Eusebio Higuera on board . hx of intubated. On  ssi for glucose control. 2. PNA   From chest x-ray ,on  levoquin   3. Migraine HA  Restart home med   4 Depression. No si and HI   5 HTN   Continue home meds   6 anxiety   Controlled oer Klonopin   7 constipation   Add MOM and continue mirilax   8 hypoxia   Wean off nc as tolerated        Subjective; sob improving. Still no bm      Nurse; no acute issue . Elevated bp,     Will continue wean off nc o2      Review of systems:    General: No fevers or chills. Cardiovascular: No chest pain or pressure. No palpitations. Pulmonary:  shortness of breath better, coughs   Gastrointestinal: No nausea, vomiting. Constipation     Vital signs/Intake and Output:  Visit Vitals    /87 (BP 1 Location: Left arm, BP Patient Position: At rest)    Pulse 99    Temp 97.3 °F (36.3 °C)    Resp 16    Ht 5' 2\" (1.575 m)    Wt 76.2 kg (167 lb 15.9 oz)    SpO2 100%    BMI 30.73 kg/m2     Current Shift:  03/23 0701 - 03/23 1900  In: 740 [P.O.:740]  Out: 300 [Urine:300]  Last three shifts:  03/21 1901 - 03/23 0700  In: 740 [P.O.:740]  Out: 250 [Urine:250]    Physical Exam:  General: WD, WN.   Alert, cooperative, no acute distress    HEENT: NC, Atraumatic. PERRLA, anicteric sclerae. Lungs: + Wheezing, no Rhonchi/Rales. Heart:  Regular  rhythm,  No murmur, No Rubs, No Gallops  Abdomen: Soft, Non distended, Non tender.  +Bowel sounds,   Extremities: No c/c/e  Psych:   Not anxious or agitated. Neurologic:  No acute neurological deficit. Labs: Results:       Chemistry Recent Labs      03/21/17   0505   GLU  180*   NA  140   K  3.5   CL  104   CO2  20*   BUN  11   CREA  1.06   CA  8.7   AGAP  16   BUCR  10*      CBC w/Diff No results for input(s): WBC, RBC, HGB, HCT, PLT, GRANS, LYMPH, EOS, HGBEXT, HCTEXT, PLTEXT, HGBEXT, HCTEXT, PLTEXT in the last 72 hours. Cardiac Enzymes No results for input(s): CPK, CKND1, YAMILKA in the last 72 hours. No lab exists for component: CKRMB, TROIP   Coagulation No results for input(s): PTP, INR, APTT in the last 72 hours. No lab exists for component: INREXT, INREXT    Lipid Panel No results found for: CHOL, CHOLPOCT, CHOLX, CHLST, CHOLV, [id number], HDL, LDL, NLDLCT, DLDL, LDLC, DLDLP, [id number], VLDLC, VLDL, TGL, TGLX, TRIGL, [id number], TRIGP, TGLPOCT, [id number], CHHD, CHHDX   BNP No results for input(s): BNPP in the last 72 hours. Liver Enzymes No results for input(s): TP, ALB, TBIL, AP, SGOT, GPT in the last 72 hours.     No lab exists for component: DBIL   Thyroid Studies No results found for: T4, T3U, TSH, TSHEXT, TSHEXT     Procedures/imaging: see electronic medical records for all procedures/Xrays and details which were not copied into this note but were reviewed prior to creation of Eric Smith MD

## 2017-03-23 NOTE — PROGRESS NOTES
[facility]  Pulmonary, Critical Care, and Sleep Medicine         Name: Maria Elena Murdock MRN: [id number]   : 1960 Hospital: [facility] [place]   Date: 3/23/2017        Subjective:   3/23  Feels better. Wheezing and coughing less. Toleating po. Less anxious. \  This patient has been seen and evaluated at the request of Dr. Brandi Mckeon for acute asthma exacerbation. Patient is a 55 y.o. female with multiple medical problems listed below presented with s/o nonproductive cough and severe wheezing. No relief with bronchodilators , presented to ED yesterday and was admitted with asthma exacerbation. Pt had similar episodes in 2016. At that time I thought patient may have severe anxiety disorder and VCD in addition to asthma. Pt recalls converstation. Pt has been on singulair as out patient. Pt has anxiety disorder currently on lexapro. No fever or chills. No n/v/d. Able to tolerate po. Had breakfast and lunch without difficulty. Past Medical History:   Diagnosis Date    Arthritis     Asthma     Chronic kidney disease     deformed right  kidney    Diabetes ([address]     Hypertension     Migraines     PUD (peptic ulcer disease)     H/O, no meds at this time     Seizures ([address]     Unspecified sleep apnea     uses cpap      Past Surgical History:   Procedure Laterality Date    HX APPENDECTOMY      HX GYN      hysterectomy      Prior to Admission medications    Medication Sig Start Date End Date Taking? Authorizing Provider   montelukast (SINGULAIR) 10 mg tablet Take 10 mg by mouth daily. Yes Historical Provider   naproxen (NAPROSYN) 500 mg tablet Take 500 mg by mouth two (2) times daily as needed. Yes Historical Provider   ondansetron (ZOFRAN ODT) 4 mg disintegrating tablet Take 4 mg by mouth every eight (8) hours as needed for Nausea. Yes Tanesha Acuña MD   raNITIdine (ZANTAC) 150 mg tablet Take 150 mg by mouth two (2) times a day.    Yes Gillian Pathak Bárbara Adrian MD   conjugated estrogens (PREMARIN) 0.9 mg tablet Take 0.9 mg by mouth daily. Yes Em Gore MD   fluticasone-salmeterol (ADVAIR) 250-50 mcg/dose diskus inhaler Take 1 Puff by inhalation two (2) times a day. 11/9/16  Yes Rosmery Campuzano MD   albuterol (PROVENTIL VENTOLIN) 2.5 mg /3 mL (0.083 %) nebulizer solution 3 mL by Nebulization route three (3) times daily. 11/9/16  Yes Rosmery Campuzano MD   albuterol [facility] HFA) 90 mcg/actuation inhaler Take 2 Puffs by inhalation every four (4) hours as needed for Wheezing. Yes Historical Provider   escitalopram oxalate (LEXAPRO) 10 mg tablet Take 15 mg by mouth daily. Yes Historical Provider   aspirin delayed-release 81 mg tablet Take 81 mg by mouth daily. Yes Historical Provider   levETIRAcetam (KEPPRA) 500 mg tablet Take 500 mg by mouth two (2) times a day. Yes Kirk Ryan MD   losartan-hydrochlorothiazide (HYZAAR) 100-25 mg per tablet Take 1 Tab by mouth daily. Yes Kirk Ryan MD   albuterol (PROVENTIL, VENTOLIN) 90 mcg/actuation inhaler Take 1-2 Puffs by inhalation every four (4) hours as needed for Wheezing. 12/15/12  Yes Cheo Israel MD   OXcarbazepine (TRILEPTAL) 300 mg tablet Take 300 mg by mouth two (2) times a day. Yes Kirk Ryan MD   acetaminophen (TYLENOL) 500 mg tablet Take 1,000 mg by mouth every six (6) hours as needed for Pain or Fever. Em Gore MD   SUMAtriptan succinate 6 mg/0.5 mL kit 6 mg by SubCUTAneous route once as needed for Migraine.     Kirk Ryan MD     Allergies   Allergen Reactions    Morphine Itching     Can tolerate if premed w/ benadryl    Pertussis Vaccine,Adsorbed Swelling     Swelling at injection site    Vicodin [Hydrocodone-Acetaminophen] Itching     Can tolerate if premed w/ benadryl      Social History   Substance Use Topics    Smoking status: Never Smoker    Smokeless tobacco: Not on file    Alcohol use No      Family History   Problem Relation Age of Onset    Malignant Hyperthermia Neg Hx     Pseudocholinesterase Deficiency Neg Hx     Delayed Awakening Neg Hx     Post-op Nausea/Vomiting Neg Hx     Emergence Delirium Neg Hx     Post-op Cognitive Dysfunction Neg Hx     Other Neg Hx         Current Facility-Administered Medications   Medication Dose Route Frequency    [START ON 3/24/2017] aspirin delayed-release tablet 81 mg  81 mg Oral DAILY    levETIRAcetam (KEPPRA) tablet 500 mg  500 mg Oral BID    OXcarbazepine (TRILEPTAL) tablet 300 mg  300 mg Oral BID    losartan/hydroCHLOROthiazide (HYZAAR) 100/25 mg   Oral DAILY    polyethylene glycol (MIRALAX) packet 17 g  17 g Oral BID    levoFLOXacin (LEVAQUIN) 500 mg in D5W IVPB  500 mg IntraVENous Q24H    arformoterol (BROVANA) neb solution 15 mcg  15 mcg Nebulization BID RT    budesonide (PULMICORT) 500 mcg/2 ml nebulizer suspension  500 mcg Nebulization BID RT    insulin lispro (HUMALOG) injection   SubCUTAneous AC&HS    pantoprazole (PROTONIX) tablet 40 mg  40 mg Oral ACB    dornase alpha (PULMOZYME)2.5mg/2.5ml nebulizer solution  2.5 mg Nebulization BID RT    methylPREDNISolone (PF) (SOLU-MEDROL) injection 60 mg  60 mg IntraVENous Q6H    acetylcysteine (MUCOMYST) 100 mg/mL (10 %) nebulizer solution 200 mg  2 mL Nebulization TID RT    montelukast (SINGULAIR) tablet 10 mg  10 mg Oral QHS    dronabinol (MARINOL) capsule 2.5 mg  2.5 mg Oral ACB&D    clonazePAM (KlonoPIN) tablet 0.5 mg  0.5 mg Oral TID    enoxaparin (LOVENOX) injection 40 mg  40 mg SubCUTAneous Q24H               Objective:   Vital Signs:    Visit Vitals    /87 (BP 1 Location: Left arm, BP Patient Position: At rest)    Pulse 99    Temp 97.3 °F (36.3 °C)    Resp 16    Ht 5' 2\" (1.575 m)    Wt 76.2 kg (167 lb 15.9 oz)    SpO2 100%    BMI 30.73 kg/m2       O2 Device: Nasal cannula   O2 Flow Rate (L/min): 2 l/min   Temp (24hrs), Av.6 °F (36.4 °C), Min:97.3 °F (36.3 °C), Max:97.8 °F (36.6 °C)       Intake/Output:     Intake/Output Summary (Last 24 hours) at 03/23/17 1227  Last data filed at 03/23/17 0842   Gross per 24 hour   Intake              940 ml   Output              550 ml   Net              390 ml         Physical Exam:   General: anxious,  HEENT;  PERRLA  Neck: No adenopathy or thyroid swelling  CVS: S1S2 no murmurs  RS: Mod AE bilaterally,  Good excursion, scatter wheezing,  Much of the wheezing is coming for neck/vocal cord. Abd: soft, non tender, no hepatosplenomegaly  Neuro: non focal, awake, alert  Extrm: no leg edema   Skin: no rash    Data review:   Labs:  No results for input(s): WBC, HGB, HCT, PLT, HGBEXT, HCTEXT, PLTEXT, HGBEXT, HCTEXT, PLTEXT in the last 72 hours. Recent Labs      03/21/17   0505   NA  140   K  3.5   CL  104   CO2  20*   GLU  180*   BUN  11   CREA  1.06   CA  8.7     No results for input(s): PH, PCO2, PO2, HCO3, FIO2 in the last 72 hours. Imaging:  I have personally reviewed the patients radiographs and have reviewed the reports: Allergies        Unspecified: Morphine;  Pertussis Vaccine,adsorbed;  Vicodin [Hydrocodone-acetaminophen]       Result Information      Status Provider Status        Final result (Exam End: 3/20/2017 12:48 PM) Open        Study Result      EXAM:Chest X-Ray      History: Shortness of breath     Technique: Portable Frontal View     Comparison: 12/23/2016, 11/7/2016     _______________     FINDINGS:  Nonspecific mild to moderate pulmonary hypoinflation degraded examination. The trachea is midline. The cardiomediastinal silhouette is midline and, within normal limits. Patchy left infrahilar opacity partially obscuring the distal left heart border,  with retained left diaphragmatic margin. Nonspecific right hemithoracic  interstitial prominence. No pneumothorax or effusion.   Intact osseous structures.     _______________     IMPRESSION  IMPRESSION:     1. Pulmonary hypoinflation degraded examination with asymmetric nonspecific  infrahilar left lower lung opacity, which may represent atelectasis/pneumonia.                   IMPRESSION:   · Acute asthma exacerbation, improving  · VCD   · Severe anxiety disorder, further exacerbated with excessive B2 agonist  · Sleep apnea on cpap at night  · ckd per hx      RECOMMENDATIONS:   · brovana and pulmicort  · Decrease the steroid  · Prn duoneb  · singulair  · Continue klonopin and marinol  · DVT, PUD prophylaxis           Carly Bliss MD

## 2017-03-23 NOTE — PROGRESS NOTES
Chart reviewed, noted 55 yr old female admitted from home for asthma exacerbation; noted 3-23-17 PT rec of rehab vs TBD. Met with pt who stated that: she'd be returning home with her ; she was not interested in rehab; she had a rolling walker. List of area home health agencies provided; [facility] completed for [facility] ([phone]); referral called to CMS. Will f/u for home health orders.

## 2017-03-23 NOTE — PROGRESS NOTES
0725: Received report on pt from Radha Sawyer RN. Chart check completed. Pt observed resting quietly in no acute distress. Assessment in doc flowsheet. Updated on plan of care for shift. Call bell within reach. 1045: Pt working with PT. Up with walker and became very dyspneic with forceful coughing. HR up in 130s per monitor tech. Only made it out to hallway before returning back to bed. Respiratory paged for audible wheezing. Pt states she also felt dizzy. /104. She states she also usually takes BP meds that she hasn't been getting.    1200: Reassessment completed. Pt now resting quietly, no respiratory distress. Family at bedside. Dr. Mary Sage to order laxative, home seizure and BP meds. Bedside shift change report given to ISAIAS Ewing (oncoming nurse) by Chad Wang. Nir Valencia RN (offgoing nurse). Report included the following information SBAR, Kardex, MAR and Recent Results.

## 2017-03-23 NOTE — PROGRESS NOTES
Problem: Self Care Deficits Care Plan (Adult)  Goal: *Acute Goals and Plan of Care (Insert Text)  Occupational Therapy Goals  Initiated 3/23/2017 within 7 day(s). 1. Patient will perform grooming with supervision/set-up 2. Patient will perform upper body dressing and lower body dressing with supervision/set-up. 3. Patient will perform standing ADLs for 3 minutes with supervision/set-up. 4. Patient will perform toilet transfers with supervision/set-up. 5. Patient will perform all aspects of toileting with supervision/set-up. 6. Patient will participate in upper extremity therapeutic exercise/activities with supervision/set-up for 8-10 minutes. 7. Patient will utilize energy conservation techniques during functional activities with verbal cues. Outcome: Progressing Towards Goal  OCCUPATIONAL THERAPY EVALUATION     Patient: Diane Lee (10 y.o. female)  Date: 3/23/2017  Primary Diagnosis: Asthma exacerbation        Precautions:   Fall      ASSESSMENT :  Based on the objective data described below, the patient presents with decreased functional strength, decreased functional balance, decreased overall activity tolerance limiting independence with ADLs. Pt supine in bed upon entering, agreeable to therapy. Pt completed supine to sit transfer with supervision. While seated EOB, pt donned gown like robe with supervision. Pt doffed/donned socks with supervision. Pt completed sit to stand transfer with CGA. Pt completed functional mobility with use of RW with CGA. Pt with increased SOB and wheezing with mobility. Pt returned to supine in bed with supervision. Assessed vitals. Heart rate george to 130s and SPO2 was 97% on 2L. Blood pressure was 154/104. Pt left supine in bed with needs in reach. Pt would benefit from continued OT services to improve safety and independence with ADL tasks/transfers.      Education: Role of OT in acute care, plan of care     Patient will benefit from skilled intervention to address the above impairments. Patients rehabilitation potential is considered to be Good  Factors which may influence rehabilitation potential include:   [ ]             None noted  [ ]             Mental ability/status  [X]             Medical condition  [ ]             Home/family situation and support systems  [ ]             Safety awareness  [ ]             Pain tolerance/management  [ ]             Other:        PLAN :  Recommendations and Planned Interventions:  [X]               Self Care Training                  [X]        Therapeutic Activities  [X]               Functional Mobility Training    [ ]        Cognitive Retraining  [X]               Therapeutic Exercises           [X]        Endurance Activities  [X]               Balance Training                   [X]        Neuromuscular Re-Education  [ ]               Visual/Perceptual Training     [X]   Home Safety Training  [X]               Patient Education                 [X]        Family Training/Education  [ ]               Other (comment):     Frequency/Duration: Patient will be followed by occupational therapy 3-5 times a week to address goals. Discharge Recommendations: Home Health  Further Equipment Recommendations for Discharge: TBD       SUBJECTIVE:   Patient stated .      OBJECTIVE DATA SUMMARY:       Past Medical History:   Diagnosis Date    Arthritis      Asthma      Chronic kidney disease       deformed right  kidney    Diabetes ([facility]      Hypertension      Migraines      PUD (peptic ulcer disease)       H/O, no meds at this time 2016    Seizures ([facility]      Unspecified sleep apnea       uses cpap     Past Surgical History:   Procedure Laterality Date    HX APPENDECTOMY        HX GYN         hysterectomy     Barriers to Learning/Limitations: None  Compensate with: visual, verbal, tactile, kinesthetic cues/model  GCODES (GO)Self Care  Current  CJ= 20-39%   Goal  CI= 1-19%.   The severity rating is based on the Other modified barthel index  Prior Level of Function/Home Situation: I with ADLs  Home Situation  Home Environment: Private residence  # Steps to Enter: 3  Rails to Enter: No  One/Two Story Residence: One story  Living Alone: No  Support Systems: Family member(s)  Patient Expects to be Discharged to[de-identified] Private residence  Current DME Used/Available at Home: Zeinafel[employer] Martinezes, straight, Walker, rolling     Cognitive/Behavioral Status:  Neurologic State: Alert; Appropriate for age  Orientation Level: Oriented X4  Cognition: Appropriate decision making; Appropriate for age attention/concentration; Appropriate safety awareness; Follows commands  Safety/Judgement: Awareness of environment; Fall prevention;Good awareness of safety precautions  Coordination:  Coordination: Within functional limits          Balance:  Sitting: Intact  Standing: Impaired; With support  Standing - Static: Good  Standing - Dynamic : Fair  Strength:  Strength: Generally decreased, functional     Tone & Sensation:  Tone: Normal  Sensation: Intact        Range of Motion:  AROM: Generally decreased, functional  PROM: Generally decreased, functional     Functional Mobility and Transfers for ADLs:  Bed Mobility:  Rolling: Supervision  Supine to Sit: Contact guard assistance  Sit to Supine: Contact guard assistance  Scooting: Supervision  Transfers:  Sit to Stand: Contact guard assistance              Bathroom Mobility: Stand-by assistance;Contact guard assistance (simulated)  ADL Assessment:   Upper Body Dressing: Setup     Lower Body Dressing: Supervision     ADL Intervention:  Upper Body Dressing Assistance  Dressing Assistance: Supervision/set-up  Shirt simulation with hospital gown: Supervision/set-up     Lower Body Dressing Assistance  Dressing Assistance: Supervision/set up  Socks: Supervision/set-up  Leg Crossed Method Used: Yes  Position Performed: Seated edge of bed     Cognitive Retraining  Safety/Judgement: Awareness of environment; Fall prevention;Good awareness of safety precautions     Pain:  Pre-treatment: 0  Post-treatment: 0  Activity Tolerance:   fair  Please refer to the flowsheet for vital signs taken during this treatment. After treatment:   [ ] Patient left in no apparent distress sitting up in chair  [X] Patient left in no apparent distress in bed  [X] Call bell left within reach  [X] Nursing notified  [ ] Caregiver present  [ ] Bed alarm activated      COMMUNICATION/EDUCATION:   [ ] Home safety education was provided and the patient/caregiver indicated understanding. [X] Patient/family have participated as able in goal setting and plan of care. [X] Patient/family agree to work toward stated goals and plan of care. [ ] Patient understands intent and goals of therapy, but is neutral about his/her participation. [ ] Patient is unable to participate in goal setting and plan of care.      Thank you for this referral.  Kayley Palacios MS OTR/L  Time Calculation: 17 mins

## 2017-03-24 LAB
CREAT SERPL-MCNC: 0.97 MG/DL (ref 0.6–1.3)
GLUCOSE BLD STRIP.AUTO-MCNC: 122 MG/DL (ref 70–110)
GLUCOSE BLD STRIP.AUTO-MCNC: 124 MG/DL (ref 70–110)
GLUCOSE BLD STRIP.AUTO-MCNC: 127 MG/DL (ref 70–110)
GLUCOSE BLD STRIP.AUTO-MCNC: 144 MG/DL (ref 70–110)

## 2017-03-24 PROCEDURE — 65270000029 HC RM PRIVATE

## 2017-03-24 PROCEDURE — 36415 COLL VENOUS BLD VENIPUNCTURE: CPT | Performed by: INTERNAL MEDICINE

## 2017-03-24 PROCEDURE — 74011250637 HC RX REV CODE- 250/637: Performed by: INTERNAL MEDICINE

## 2017-03-24 PROCEDURE — 77010033678 HC OXYGEN DAILY

## 2017-03-24 PROCEDURE — 74011250636 HC RX REV CODE- 250/636: Performed by: INTERNAL MEDICINE

## 2017-03-24 PROCEDURE — 94640 AIRWAY INHALATION TREATMENT: CPT

## 2017-03-24 PROCEDURE — 74011250636 HC RX REV CODE- 250/636: Performed by: HOSPITALIST

## 2017-03-24 PROCEDURE — 74011000250 HC RX REV CODE- 250: Performed by: INTERNAL MEDICINE

## 2017-03-24 PROCEDURE — 74011250637 HC RX REV CODE- 250/637: Performed by: HOSPITALIST

## 2017-03-24 PROCEDURE — 74011000250 HC RX REV CODE- 250: Performed by: HOSPITALIST

## 2017-03-24 PROCEDURE — 94760 N-INVAS EAR/PLS OXIMETRY 1: CPT

## 2017-03-24 PROCEDURE — 82565 ASSAY OF CREATININE: CPT | Performed by: INTERNAL MEDICINE

## 2017-03-24 PROCEDURE — 82962 GLUCOSE BLOOD TEST: CPT

## 2017-03-24 PROCEDURE — 97116 GAIT TRAINING THERAPY: CPT

## 2017-03-24 RX ADMIN — BUDESONIDE 500 MCG: 0.5 INHALANT RESPIRATORY (INHALATION) at 20:20

## 2017-03-24 RX ADMIN — PANTOPRAZOLE SODIUM 40 MG: 40 TABLET, DELAYED RELEASE ORAL at 07:10

## 2017-03-24 RX ADMIN — CLONAZEPAM 0.5 MG: 0.5 TABLET ORAL at 16:56

## 2017-03-24 RX ADMIN — ACETYLCYSTEINE 200 MG: 100 SOLUTION ORAL; RESPIRATORY (INHALATION) at 07:37

## 2017-03-24 RX ADMIN — OXCARBAZEPINE 300 MG: 150 TABLET ORAL at 22:46

## 2017-03-24 RX ADMIN — METHYLPREDNISOLONE SODIUM SUCCINATE 60 MG: 125 INJECTION, POWDER, FOR SOLUTION INTRAMUSCULAR; INTRAVENOUS at 01:13

## 2017-03-24 RX ADMIN — ACETYLCYSTEINE 200 MG: 100 SOLUTION ORAL; RESPIRATORY (INHALATION) at 14:07

## 2017-03-24 RX ADMIN — DRONABINOL 2.5 MG: 2.5 CAPSULE ORAL at 18:52

## 2017-03-24 RX ADMIN — ARFORMOTEROL TARTRATE 15 MCG: 15 SOLUTION RESPIRATORY (INHALATION) at 07:37

## 2017-03-24 RX ADMIN — METHYLPREDNISOLONE SODIUM SUCCINATE 60 MG: 125 INJECTION, POWDER, FOR SOLUTION INTRAMUSCULAR; INTRAVENOUS at 07:10

## 2017-03-24 RX ADMIN — MONTELUKAST SODIUM 10 MG: 10 TABLET, FILM COATED ORAL at 22:46

## 2017-03-24 RX ADMIN — METHYLPREDNISOLONE SODIUM SUCCINATE 60 MG: 125 INJECTION, POWDER, FOR SOLUTION INTRAMUSCULAR; INTRAVENOUS at 23:02

## 2017-03-24 RX ADMIN — LEVOFLOXACIN 500 MG: 5 INJECTION, SOLUTION INTRAVENOUS at 11:57

## 2017-03-24 RX ADMIN — ARFORMOTEROL TARTRATE 15 MCG: 15 SOLUTION RESPIRATORY (INHALATION) at 20:20

## 2017-03-24 RX ADMIN — LEVETIRACETAM 500 MG: 500 TABLET, FILM COATED ORAL at 22:46

## 2017-03-24 RX ADMIN — OXCARBAZEPINE 300 MG: 150 TABLET ORAL at 09:09

## 2017-03-24 RX ADMIN — ACETYLCYSTEINE 200 MG: 100 SOLUTION ORAL; RESPIRATORY (INHALATION) at 20:21

## 2017-03-24 RX ADMIN — ENOXAPARIN SODIUM 40 MG: 40 INJECTION SUBCUTANEOUS at 16:56

## 2017-03-24 RX ADMIN — LEVETIRACETAM 500 MG: 500 TABLET, FILM COATED ORAL at 09:09

## 2017-03-24 RX ADMIN — CLONAZEPAM 0.5 MG: 0.5 TABLET ORAL at 09:09

## 2017-03-24 RX ADMIN — CLONAZEPAM 0.5 MG: 0.5 TABLET ORAL at 22:46

## 2017-03-24 RX ADMIN — METHYLPREDNISOLONE SODIUM SUCCINATE 60 MG: 125 INJECTION, POWDER, FOR SOLUTION INTRAMUSCULAR; INTRAVENOUS at 11:57

## 2017-03-24 RX ADMIN — ALBUTEROL SULFATE 2.5 MG: 2.5 SOLUTION RESPIRATORY (INHALATION) at 14:06

## 2017-03-24 RX ADMIN — DRONABINOL 2.5 MG: 2.5 CAPSULE ORAL at 07:23

## 2017-03-24 RX ADMIN — METHYLPREDNISOLONE SODIUM SUCCINATE 60 MG: 125 INJECTION, POWDER, FOR SOLUTION INTRAMUSCULAR; INTRAVENOUS at 18:44

## 2017-03-24 RX ADMIN — HYDROCHLOROTHIAZIDE: 25 TABLET ORAL at 09:09

## 2017-03-24 RX ADMIN — ASPIRIN 81 MG: 81 TABLET, COATED ORAL at 09:09

## 2017-03-24 RX ADMIN — BUDESONIDE 500 MCG: 0.5 INHALANT RESPIRATORY (INHALATION) at 07:37

## 2017-03-24 NOTE — ROUTINE PROCESS
Bedside and Verbal shift change report given to Luberta Kanner, RN (oncoming nurse) by Smith Villanueva RN   (offgoing nurse). Report included the following information SBAR, Kardex, ED Summary, Intake/Output, MAR and Recent Results.

## 2017-03-24 NOTE — ROUTINE PROCESS
TRANSFER - IN REPORT:    Verbal report received from ISAIAS Zaragoza RN(name) on [facility]  being received from 3N(unit) for routine progression of care      Report consisted of patients Situation, Background, Assessment and   Recommendations(SBAR). Information from the following report(s) SBAR, Kardex, Intake/Output and MAR was reviewed with the receiving nurse. Opportunity for questions and clarification was provided. Assessment completed upon patients arrival to unit and care assumed.

## 2017-03-24 NOTE — PROGRESS NOTES
Hospitalist Progress Note    Patient: Arely Shay MRN: [id number]  CSN: [id number]    YOB: 1960  Age: 55 y.o. Sex: female    DOA: 3/20/2017 LOS:  LOS: 4 days          Chief Complaint:  Asthma exacerbation          Assessment/Plan       Patient Active Problem List   Diagnosis Code    Screen for colon cancer Z12.11    Acute respiratory insufficiency (HCC) R06.89    Asthma exacerbation J45. 901    Tachycardia R00.0    HTN (hypertension) I10    Pneumonia J18.9    Hypokalemia E87.6    OPAL (acute [address] injury) ([facility] N17.9    Constipation K59.00    Hypoxia R09.02     1. Acute asthma exacerbation. Improving slowly. Continue steroid and pulmicort and brovana, appreciated Dr. James Rios on board. hx of intubated. On ssi for glucose control.  -no changes today   2. PNA   From chest x-ray, on  levoquin   3. Migraine HA  Continue home med   4 Depression. No si and HI   5 HTN   Elevated diastolic this AM; monitor  Continue home meds; titrate for persistent elevation   6 anxiety   Controlled on Klonopin   7 Constipation   Add MOM and continue mirilax   8 hypoxia   Wean off nc as tolerated  -On 2L this AM  9. Diabetes  -Adequate blood surgar control; meeting goal.       Subjective:    No events over night. Patient doing well this AM.  No new complaints.       Review of systems:    Constitutional: denies fevers, chills, myalgias  Respiratory: denies SOB, cough  Cardiovascular: denies chest pain, palpitations  Gastrointestinal: denies nausea, vomiting, diarrhea      Vital signs/Intake and Output:  Visit Vitals    BP (!) 122/102 (BP 1 Location: Left arm, BP Patient Position: At rest)    Pulse 73    Temp 97.7 °F (36.5 °C)    Resp 16    Ht 5' 2\" (1.575 m)    Wt 78.5 kg (173 lb)    SpO2 100%    BMI 31.64 kg/m2     Current Shift:     Last three shifts:  03/22 1901 - 03/24 0700  In: 1580 [P.O.:1480; I.V.:100]  Out: 650 [Urine:650]    Exam:    General: Well developed, alert, NAD, OX3  Head/Neck: NCAT, supple, No masses, No lymphadenopathy  CVS:Regular rate and rhythm, no M/R/G, S1/S2 heard, no thrill  Lungs:Diffuse end expiratory wheezing bilaterally,  rhonchi, or rales  Abdomen: Soft, Nontender, No distention, Normal Bowel sounds, No hepatomegaly  Extremities: No C/C/E, pulses palpable 2+  Skin:normal texture and turgor, no rashes, no lesions  Neuro:grossly normal , follows commands  Psych:appropriate                Labs: Results:       Chemistry Recent Labs      03/24/17   0248   CREA  0.97      CBC w/Diff No results for input(s): WBC, RBC, HGB, HCT, PLT, GRANS, LYMPH, EOS, HGBEXT, HCTEXT, PLTEXT in the last 72 hours. Cardiac Enzymes No results for input(s): CPK, CKND1, YAMILKA in the last 72 hours. No lab exists for component: CKRMB, TROIP   Coagulation No results for input(s): PTP, INR, APTT in the last 72 hours. No lab exists for component: INREXT    Lipid Panel No results found for: CHOL, CHOLPOCT, CHOLX, CHLST, CHOLV, [address] [facility] [address], HDL, LDL, NLDLCT, DLDL, LDLC, DLDLP, [id number], VLDLC, VLDL, TGL, TGLX, TRIGL, [id number], TRIGP, TGLPOCT, [id number], CHHD, CHHDX   BNP No results for input(s): BNPP in the last 72 hours. Liver Enzymes No results for input(s): TP, ALB, TBIL, AP, SGOT, GPT in the last 72 hours.     No lab exists for component: DBIL   Thyroid Studies No results found for: T4, T3U, TSH, TSHEXT     Procedures/imaging: see electronic medical records for all procedures/Xrays and details which were not copied into this note but were reviewed prior to creation of Kinza Alberto MD

## 2017-03-24 NOTE — PROGRESS NOTES
Problem: Mobility Impaired (Adult and Pediatric)  Goal: *Acute Goals and Plan of Care (Insert Text)  Physical Therapy Goals  Initiated 3/23/2017 and to be accomplished within 2-8 day(s)  1. Patient will move from supine to sit and sit to supine in bed with supervision/set-up. 2. Patient will transfer from bed to chair and chair to bed with supervision/set-up using the least restrictive device. 3. Patient will perform sit to stand with supervision/set-up. 4. Patient will ambulate with supervision/set-up for 100 feet with the least restrictive device. 5. Patient will ascend/descend 3 stairs with use of handrail(s) with minimal assistance/contact guard assist.   Outcome: Progressing Towards Goal  PHYSICAL THERAPY TREATMENT     Patient: Jareth Ordoñez (12 y.o. female)  Date: 3/24/2017  Diagnosis: Asthma exacerbation Asthma exacerbation       Precautions: Fall   Chart, physical therapy assessment, plan of care and goals were reviewed. ASSESSMENT:  Patient is improving with ambulation and activity tolerance this session. Patient ambulated about 80 feet in the hallway using a rolling walker while on 2L O2. Patient appeared to tolerate ambulation much better compared to yesterday. Patient was returned back to her room, supine in bed. Will continue PT to further improve ambulatory mobility and activity tolerance. Recommend home health at this time. Progression toward goals:  [X]      Improving appropriately and progressing toward goals  [ ]      Improving slowly and progressing toward goals  [ ]      Not making progress toward goals and plan of care will be adjusted       PLAN:  Patient continues to benefit from skilled intervention to address the above impairments. Continue treatment per established plan of care. Discharge Recommendations:  Home Health  Further Equipment Recommendations for Discharge:  N/A       SUBJECTIVE:   Patient stated I'm doing a little bit better.       OBJECTIVE DATA SUMMARY: Critical Behavior:  Neurologic State: Alert, Appropriate for age  Orientation Level: Oriented X4  Cognition: Appropriate decision making, Appropriate for age attention/concentration, Appropriate safety awareness, Follows commands  Safety/Judgement: Awareness of environment, Fall prevention, Good awareness of safety precautions  Functional Mobility Training:  Bed Mobility:  Rolling: Supervision  Supine to Sit: Supervision  Sit to Supine: Supervision  Scooting: Supervision  Transfers:  Sit to Stand: Contact guard assistance;Supervision  Stand to Sit: Supervision  Balance:  Sitting: Intact  Standing: Intact; With support  Standing - Static: Good  Standing - Dynamic : Good  Ambulation/Gait Training:  Distance (ft): 80 Feet (ft)  Assistive Device: Gait belt;Walker, rolling  Ambulation - Level of Assistance: Contact guard assistance  Gait Abnormalities: Decreased step clearance  Base of Support: Narrowed  Stance: Time  Speed/Dafne: Slow  Step Length: Right shortened;Left shortened  Swing Pattern: Left asymmetrical;Right asymmetrical     Pain:  Pain Scale 1: FLACC  Pain Intensity 1: 0  Activity Tolerance:   Fair  Please refer to the flowsheet for vital signs taken during this treatment.   After treatment:   [ ] Patient left in no apparent distress sitting up in chair  [X] Patient left in no apparent distress in bed  [X] Call bell left within reach  [X] Nursing notified  [ ] Caregiver present  [ ] Bed alarm activated      Onalee Finders   Time Calculation: 18 mins

## 2017-03-24 NOTE — ROUTINE PROCESS
Bedside shift change report given to Enrique Fox RN (oncoming nurse) by Angel Benavides RN  (offgoing nurse). Report included the following information SBAR, Kardex, Intake/Output and MAR.

## 2017-03-24 NOTE — PROGRESS NOTES
2308 Pt arrived to rm 307. Coughing; productive and congested-sounding. Pt reports last BM to be today 3/23 but it was \"small. \"     2320 Lung sounds with expiratory wheeze and coarseness. Pt able to stand on bedside, but quickly needs to sit down as it makes it more difficult to breathe. Guaifensin-codeine given for cough and sore pain. Resp. Therapist called for neb tx.     0100 Pt relaxed now. CPAP on. Resting quietly. SHIFT SUMMARY: Pt able to sleep during this night shift after breathing treatment and guaifenesin-codeine elixir. However, she becomes SoB on exertion. Gait becomes unsteady and pt requires walker and assistance to ambulate. Voiding. BM this   AM  is Liz Laboy, and he can be reached at [phone].

## 2017-03-24 NOTE — PROGRESS NOTES
Reviewed chart and discussed case with Dr. Mercy Banuelos, who stated pt was not ready for discharge today. Observed pt ambulating in hallway with rolling walker and P. T.today, and noted referral has been initiated to [facility]. Pt plans to discharge home with her , and states she already has a rolling walker at home.   If pt should discharge on the weekend, please page care manager on call to follow-up on the home health referral.

## 2017-03-24 NOTE — PROGRESS NOTES
0740 Bedside report received from Ady Huddleston RN.    2211 Medication refusal  The patient has refused the following medication(s):  Miralax  The patient has been educated about the risks of refusing the/these medication(s).     Physician: Dr. Cruzito Alvarado has been notified and gave no orders, which were:    [] Per protocol [] Telephone w/ Readback [] Verbal with Readback   [] Written/faxed [] Within scope of practice  Lindsay Johnson RN

## 2017-03-24 NOTE — PROGRESS NOTES
Pt using home cpap with 2 lpm bleed-in O2. RT ensured machine setup and water in humidifier chamber, and pt is comfortable placing on and off self at leisure.

## 2017-03-24 NOTE — INTERDISCIPLINARY ROUNDS
Interdisciplinary Round Note   Patient Information: Michael Desai                                      307/01 Reason for Admission: Asthma exacerbation  Quality Measure: PNA and COPD            Attending Provider:   Mariana Woody MD  Primary Care Physician:       Orville Gamble MD       [phone]  Consulting:  IP CONSULT TO HOSPITALIST  IP CONSULT TO INTENSIVIST  IDR Rounding Physician:  Past Medical History:   Past Medical History:   Diagnosis Date    Arthritis     Asthma     Chronic kidney disease     deformed right  kidney    Diabetes ([address]     Hypertension     Migraines     PUD (peptic ulcer disease)     H/O, no meds at this time 2016    Seizures ([address]     Unspecified sleep apnea     uses cpap        Hospital day: 4                          2d 12h  Estimated discharge date:   RRAT Score: Low Risk            9       Total Score        4 More than 1 Admission in calendar year    5 Patient Insurance is Medicare, Medicaid or Self Pay        Criteria that do not apply:    Relationship with PCP    Patient Living Status    Patient Length of Stay > 5    Charlson Comorbidity Score         Primary Goals for Today: Improve work of breathing, neb tx, steroids    Secondary Goals for Today: pain control    Overnight Events: SoB on exertion; transferred [facility]    Verduzco: no    Central Line: no    Isolation: no       IV Antibiotics?  Levaquin       When started: 3/21/17  Current Medication List:          Current Facility-Administered Medications   Medication Dose Route Frequency    magnesium hydroxide (MILK OF MAGNESIA) 400 mg/5 mL oral suspension 30 mL  30 mL Oral DAILY PRN    aspirin delayed-release tablet 81 mg  81 mg Oral DAILY    levETIRAcetam (KEPPRA) tablet 500 mg  500 mg Oral BID    OXcarbazepine (TRILEPTAL) tablet 300 mg  300 mg Oral BID    losartan/hydroCHLOROthiazide (HYZAAR) 100/25 mg   Oral DAILY    polyethylene glycol (MIRALAX) packet 17 g  17 g Oral BID    levoFLOXacin (LEVAQUIN) 500 mg in D5W IVPB  500 mg IntraVENous Q24H    arformoterol (BROVANA) neb solution 15 mcg  15 mcg Nebulization BID RT    budesonide (PULMICORT) 500 mcg/2 ml nebulizer suspension  500 mcg Nebulization BID RT    insulin lispro (HUMALOG) injection   SubCUTAneous AC&HS    glucose chewable tablet 16 g  4 Tab Oral PRN    glucagon (GLUCAGEN) injection 1 mg  1 mg IntraMUSCular PRN    dextrose (D50W) injection syrg 12.5-25 g  25-50 mL IntraVENous PRN    pantoprazole (PROTONIX) tablet 40 mg  40 mg Oral ACB    albuterol-ipratropium (DUO-NEB) 2.5 MG-0.5 MG/3 ML  3 mL Nebulization Q4H PRN    methylPREDNISolone (PF) (SOLU-MEDROL) injection 60 mg  60 mg IntraVENous Q6H    acetylcysteine (MUCOMYST) 100 mg/mL (10 %) nebulizer solution 200 mg  2 mL Nebulization TID RT    montelukast (SINGULAIR) tablet 10 mg  10 mg Oral QHS    dronabinol (MARINOL) capsule 2.5 mg  2.5 mg Oral ACB&D    clonazePAM (KlonoPIN) tablet 0.5 mg  0.5 mg Oral TID    guaiFENesin-codeine (ROBITUSSIN AC) 100-10 mg/5 mL solution 10 mL  10 mL Oral Q4H PRN    albuterol (PROVENTIL VENTOLIN) nebulizer solution 2.5 mg  2.5 mg Nebulization Q4H PRN    enoxaparin (LOVENOX) injection 40 mg  40 mg SubCUTAneous Q24H      VTE Prophylaxis                                 Lines, Drains, & Airways  [REMOVED] Peripheral IV 03/21/17 Left; Inner Arm-Site Assessment: Clean, dry, & intact  Peripheral IV 03/20/17 Right Antecubital-Site Assessment: Clean, dry, & intact     Vital signs:  Visit Vitals    /83 (BP 1 Location: Left arm, BP Patient Position: At rest)    Pulse 66    Temp 97.6 °F (36.4 °C)    Resp 16    Ht 5' 2\" (1.575 m)    Wt 78.5 kg (173 lb)    SpO2 100%    BMI 31.64 kg/m2        Intake and Output:   03/22 0701 - 03/23 1900  In: 1138 [P.O.:1480; I.V.:100]  Out: 650 [Urine:650]     Last Bowel Movement Date: 03/23/17 (\"small\")                 Current Diet: DIET REGULAR       Abdominal   Abdominal Assessment: Constipation, Intact  Appetite: Good  Bowel Sounds: Active   Nutrition  Chewing/Swallowing Problems: No  Difficulty with Secretions: No  Speech Slurred/Thick/Garbled: No    NGT: no    Feeding Tube: no   Recent Glucose Results:   Lab Results   Component Value Date/Time    GLUCPOC 122 (H) 03/23/2017 09:30 PM    GLUCPOC 132 (H) 03/23/2017 04:57 PM    GLUCPOC 140 (H) 03/23/2017 11:44 AM    GI Prophylaxis: yes        Type: Protonix        Activity Level: Activity Level: Up with Assistance    Needs assistance with ADLs: no  PT Consult Status: no  Equipment: no  Surgical/Ortho Notes: no   Current Immunizations:  Immunization History   Administered Date(s) Administered    Influenza Vaccine 10/01/2016    Tdap 08/31/2015     RRAT Score:     Readmit Risk Tool  Support Systems: Family member(s)  Relationship with Primary Physician Group: Seen at least one time within past 12 months   Recommendations:       Discharge Disposition: TBD, pending progress    Needs for Discharge: oxygen?            Recommendations from IDR team: O2 sats >100% at 2L NC/CPAP    Other Notes: no

## 2017-03-24 NOTE — PROGRESS NOTES
[facility]  Pulmonary, Critical Care, and Sleep Medicine         Name: Benjie Soriano MRN: [id number]   : 1960 Hospital: [facility] [place]   Date: 3/24/2017        Subjective:   3/24  Pt continue to improve. Able to ambulate. Slept better. Less anxious. Likes marinol and klonopin. \  This patient has been seen and evaluated at the request of Dr. Greta Westfall for acute asthma exacerbation. Patient is a 55 y.o. female with multiple medical problems listed below presented with s/o nonproductive cough and severe wheezing. No relief with bronchodilators , presented to ED yesterday and was admitted with asthma exacerbation. Pt had similar episodes in 2016. At that time I thought patient may have severe anxiety disorder and VCD in addition to asthma. Pt recalls converstation. Pt has been on singulair as out patient. Pt has anxiety disorder currently on lexapro. No fever or chills. No n/v/d. Able to tolerate po. Had breakfast and lunch without difficulty. Past Medical History:   Diagnosis Date    Arthritis     Asthma     Chronic kidney disease     deformed right  kidney    Diabetes ([address]     Hypertension     Migraines     PUD (peptic ulcer disease)     H/O, no meds at this time     Seizures ([address]     Unspecified sleep apnea     uses cpap      Past Surgical History:   Procedure Laterality Date    HX APPENDECTOMY      HX GYN      hysterectomy      Prior to Admission medications    Medication Sig Start Date End Date Taking? Authorizing Provider   montelukast (SINGULAIR) 10 mg tablet Take 10 mg by mouth daily. Yes Historical Provider   naproxen (NAPROSYN) 500 mg tablet Take 500 mg by mouth two (2) times daily as needed. Yes Historical Provider   ondansetron (ZOFRAN ODT) 4 mg disintegrating tablet Take 4 mg by mouth every eight (8) hours as needed for Nausea.    Yes Eliza Longo MD   raNITIdine (ZANTAC) 150 mg tablet Take 150 mg by mouth two (2) times a day.   Yes Gabi Hernandez MD   conjugated estrogens (PREMARIN) 0.9 mg tablet Take 0.9 mg by mouth daily. Yes Gabi Hernandez MD   fluticasone-salmeterol (ADVAIR) 250-50 mcg/dose diskus inhaler Take 1 Puff by inhalation two (2) times a day. 11/9/16  Yes Brandi Kincaid MD   albuterol (PROVENTIL VENTOLIN) 2.5 mg /3 mL (0.083 %) nebulizer solution 3 mL by Nebulization route three (3) times daily. 11/9/16  Yes Brandi Kincaid MD   albuterol [facility] HFA) 90 mcg/actuation inhaler Take 2 Puffs by inhalation every four (4) hours as needed for Wheezing. Yes Historical Provider   escitalopram oxalate (LEXAPRO) 10 mg tablet Take 15 mg by mouth daily. Yes Historical Provider   aspirin delayed-release 81 mg tablet Take 81 mg by mouth daily. Yes Historical Provider   levETIRAcetam (KEPPRA) 500 mg tablet Take 500 mg by mouth two (2) times a day. Yes Kirk Ryan MD   losartan-hydrochlorothiazide (HYZAAR) 100-25 mg per tablet Take 1 Tab by mouth daily. Yes Kirk Ryan MD   albuterol (PROVENTIL, VENTOLIN) 90 mcg/actuation inhaler Take 1-2 Puffs by inhalation every four (4) hours as needed for Wheezing. 12/15/12  Yes Nita Britt MD   OXcarbazepine (TRILEPTAL) 300 mg tablet Take 300 mg by mouth two (2) times a day. Yes Kirk Ryan MD   acetaminophen (TYLENOL) 500 mg tablet Take 1,000 mg by mouth every six (6) hours as needed for Pain or Fever. Gabi Hernandez MD   SUMAtriptan succinate 6 mg/0.5 mL kit 6 mg by SubCUTAneous route once as needed for Migraine.     Kirk Ryan MD     Allergies   Allergen Reactions    Morphine Itching     Can tolerate if premed w/ benadryl    Pertussis Vaccine,Adsorbed Swelling     Swelling at injection site    Vicodin [Hydrocodone-Acetaminophen] Itching     Can tolerate if premed w/ benadryl      Social History   Substance Use Topics    Smoking status: Never Smoker    Smokeless tobacco: Not on file    Alcohol use No      Family History   Problem Relation Age of Onset  Malignant Hyperthermia Neg Hx     Pseudocholinesterase Deficiency Neg Hx     Delayed Awakening Neg Hx     Post-op Nausea/Vomiting Neg Hx     Emergence Delirium Neg Hx     Post-op Cognitive Dysfunction Neg Hx     Other Neg Hx         Current Facility-Administered Medications   Medication Dose Route Frequency    aspirin delayed-release tablet 81 mg  81 mg Oral DAILY    levETIRAcetam (KEPPRA) tablet 500 mg  500 mg Oral BID    OXcarbazepine (TRILEPTAL) tablet 300 mg  300 mg Oral BID    losartan/hydroCHLOROthiazide (HYZAAR) 100/25 mg   Oral DAILY    polyethylene glycol (MIRALAX) packet 17 g  17 g Oral BID    levoFLOXacin (LEVAQUIN) 500 mg in D5W IVPB  500 mg IntraVENous Q24H    arformoterol (BROVANA) neb solution 15 mcg  15 mcg Nebulization BID RT    budesonide (PULMICORT) 500 mcg/2 ml nebulizer suspension  500 mcg Nebulization BID RT    insulin lispro (HUMALOG) injection   SubCUTAneous AC&HS    pantoprazole (PROTONIX) tablet 40 mg  40 mg Oral ACB    methylPREDNISolone (PF) (SOLU-MEDROL) injection 60 mg  60 mg IntraVENous Q6H    acetylcysteine (MUCOMYST) 100 mg/mL (10 %) nebulizer solution 200 mg  2 mL Nebulization TID RT    montelukast (SINGULAIR) tablet 10 mg  10 mg Oral QHS    dronabinol (MARINOL) capsule 2.5 mg  2.5 mg Oral ACB&D    clonazePAM (KlonoPIN) tablet 0.5 mg  0.5 mg Oral TID    enoxaparin (LOVENOX) injection 40 mg  40 mg SubCUTAneous Q24H               Objective:   Vital Signs:    Visit Vitals    BP (!) 149/106 (BP 1 Location: Left arm, BP Patient Position: At rest)    Pulse 71    Temp 97.6 °F (36.4 °C)    Resp 16    Ht 5' 2\" (1.575 m)    Wt 78.5 kg (173 lb)    SpO2 100%    BMI 31.64 kg/m2       O2 Device: Nasal cannula   O2 Flow Rate (L/min): 2 l/min   Temp (24hrs), Av.7 °F (36.5 °C), Min:97.2 °F (36.2 °C), Max:98.4 °F (36.9 °C)       Intake/Output:     Intake/Output Summary (Last 24 hours) at 17 1253  Last data filed at 17 1722   Gross per 24 hour   Intake 240 ml   Output              100 ml   Net              140 ml         Physical Exam:   General: alert and appropriate. HEENT;  PERRLA  Neck: No adenopathy or thyroid swelling  CVS: S1S2 no murmurs  RS: Mod AE bilaterally,  Good excursion, scatter wheezing,  Much of the wheezing is coming for neck/vocal cord. Abd: soft, non tender, no hepatosplenomegaly  Neuro: non focal, awake,    Extrm: no leg edema   Skin: no rash    Data review:   Labs:  No results for input(s): WBC, HGB, HCT, PLT, HGBEXT, HCTEXT, PLTEXT, HGBEXT, HCTEXT, PLTEXT in the last 72 hours. Recent Labs      03/24/17   0248   CREA  0.97     No results for input(s): PH, PCO2, PO2, HCO3, FIO2 in the last 72 hours. Imaging:  I have personally reviewed the patients radiographs and have reviewed the reports: Allergies        Unspecified: Morphine;  Pertussis Vaccine,adsorbed;  Vicodin [Hydrocodone-acetaminophen]       Result Information      Status Provider Status        Final result (Exam End: 3/20/2017 12:48 PM) Open        Study Result      EXAM:Chest X-Ray      History: Shortness of breath     Technique: Portable Frontal View     Comparison: 12/23/2016, 11/7/2016     _______________     FINDINGS:  Nonspecific mild to moderate pulmonary hypoinflation degraded examination. The trachea is midline. The cardiomediastinal silhouette is midline and, within normal limits. Patchy left infrahilar opacity partially obscuring the distal left heart border,  with retained left diaphragmatic margin. Nonspecific right hemithoracic  interstitial prominence. No pneumothorax or effusion.   Intact osseous structures.     _______________     IMPRESSION  IMPRESSION:     1. Pulmonary hypoinflation degraded examination with asymmetric nonspecific  infrahilar left lower lung opacity, which may represent atelectasis/pneumonia.                   IMPRESSION:   · Acute asthma exacerbation, improving  · VCD   · Severe anxiety disorder, further exacerbated with excessive B2 agonist  · Sleep apnea on cpap at night  · ckd per hx      RECOMMENDATIONS:   · brovana and pulmicort  · Decrease the steroid  · Prn duoneb  · singulair  · Continue klonopin and marinol  · DVT, PUD prophylaxis           Claudette Flowers MD

## 2017-03-24 NOTE — ROUTINE PROCESS
TRANSFER - OUT REPORT:    Verbal report given to Kain Huerta on Reza Roger  being transferred to [place] for routine progression of care       Report consisted of patients Situation, Background, Assessment and   Recommendations(SBAR). Information from the following report(s) SBAR, Kardex, [facility] and Cardiac Rhythm Sinus rhythm  was reviewed with the receiving nurse. Lines:   Peripheral IV 03/20/17 Right Antecubital (Active)   Site Assessment Clean, dry, & intact 3/23/2017  3:45 PM   Phlebitis Assessment 0 3/23/2017  3:45 PM   Infiltration Assessment 0 3/23/2017  3:45 PM   Dressing Status Clean, dry, & intact 3/23/2017  3:45 PM   Dressing Type Tape;Transparent 3/23/2017  3:45 PM   Hub Color/Line Status Pink 3/23/2017  3:45 PM   Action Taken Open ports on tubing capped 3/23/2017  8:00 AM   Alcohol Cap Used Yes 3/23/2017  3:45 PM        Opportunity for questions and clarification was provided.       Patient transported with:   O2 @ 2 liters  Registered Nurse  [facility]

## 2017-03-25 LAB
GLUCOSE BLD STRIP.AUTO-MCNC: 113 MG/DL (ref 70–110)
GLUCOSE BLD STRIP.AUTO-MCNC: 115 MG/DL (ref 70–110)
GLUCOSE BLD STRIP.AUTO-MCNC: 138 MG/DL (ref 70–110)
GLUCOSE BLD STRIP.AUTO-MCNC: 140 MG/DL (ref 70–110)

## 2017-03-25 PROCEDURE — 94760 N-INVAS EAR/PLS OXIMETRY 1: CPT

## 2017-03-25 PROCEDURE — 94640 AIRWAY INHALATION TREATMENT: CPT

## 2017-03-25 PROCEDURE — 65270000029 HC RM PRIVATE

## 2017-03-25 PROCEDURE — 74011250636 HC RX REV CODE- 250/636: Performed by: INTERNAL MEDICINE

## 2017-03-25 PROCEDURE — 74011250637 HC RX REV CODE- 250/637: Performed by: HOSPITALIST

## 2017-03-25 PROCEDURE — 97116 GAIT TRAINING THERAPY: CPT

## 2017-03-25 PROCEDURE — 77010033678 HC OXYGEN DAILY

## 2017-03-25 PROCEDURE — 36415 COLL VENOUS BLD VENIPUNCTURE: CPT

## 2017-03-25 PROCEDURE — 97530 THERAPEUTIC ACTIVITIES: CPT

## 2017-03-25 PROCEDURE — 82962 GLUCOSE BLOOD TEST: CPT

## 2017-03-25 PROCEDURE — 74011000250 HC RX REV CODE- 250: Performed by: HOSPITALIST

## 2017-03-25 PROCEDURE — 74011000250 HC RX REV CODE- 250: Performed by: INTERNAL MEDICINE

## 2017-03-25 PROCEDURE — 74011250637 HC RX REV CODE- 250/637: Performed by: INTERNAL MEDICINE

## 2017-03-25 PROCEDURE — 74011250636 HC RX REV CODE- 250/636: Performed by: HOSPITALIST

## 2017-03-25 PROCEDURE — 77030018846 HC SOL IRR STRL H20 ICUM -A

## 2017-03-25 RX ADMIN — MONTELUKAST SODIUM 10 MG: 10 TABLET, FILM COATED ORAL at 22:13

## 2017-03-25 RX ADMIN — CLONAZEPAM 0.5 MG: 0.5 TABLET ORAL at 22:13

## 2017-03-25 RX ADMIN — LEVETIRACETAM 500 MG: 500 TABLET, FILM COATED ORAL at 22:13

## 2017-03-25 RX ADMIN — ARFORMOTEROL TARTRATE 15 MCG: 15 SOLUTION RESPIRATORY (INHALATION) at 20:27

## 2017-03-25 RX ADMIN — LEVOFLOXACIN 500 MG: 5 INJECTION, SOLUTION INTRAVENOUS at 13:18

## 2017-03-25 RX ADMIN — METHYLPREDNISOLONE SODIUM SUCCINATE 60 MG: 125 INJECTION, POWDER, FOR SOLUTION INTRAMUSCULAR; INTRAVENOUS at 17:50

## 2017-03-25 RX ADMIN — METHYLPREDNISOLONE SODIUM SUCCINATE 60 MG: 125 INJECTION, POWDER, FOR SOLUTION INTRAMUSCULAR; INTRAVENOUS at 05:07

## 2017-03-25 RX ADMIN — PANTOPRAZOLE SODIUM 40 MG: 40 TABLET, DELAYED RELEASE ORAL at 06:53

## 2017-03-25 RX ADMIN — HYDROCHLOROTHIAZIDE: 25 TABLET ORAL at 10:12

## 2017-03-25 RX ADMIN — DRONABINOL 2.5 MG: 2.5 CAPSULE ORAL at 06:53

## 2017-03-25 RX ADMIN — ACETYLCYSTEINE 200 MG: 100 SOLUTION ORAL; RESPIRATORY (INHALATION) at 07:30

## 2017-03-25 RX ADMIN — BUDESONIDE 500 MCG: 0.5 INHALANT RESPIRATORY (INHALATION) at 07:31

## 2017-03-25 RX ADMIN — ARFORMOTEROL TARTRATE 15 MCG: 15 SOLUTION RESPIRATORY (INHALATION) at 07:30

## 2017-03-25 RX ADMIN — BUDESONIDE 500 MCG: 0.5 INHALANT RESPIRATORY (INHALATION) at 20:27

## 2017-03-25 RX ADMIN — IPRATROPIUM BROMIDE AND ALBUTEROL SULFATE 3 ML: .5; 3 SOLUTION RESPIRATORY (INHALATION) at 10:04

## 2017-03-25 RX ADMIN — DRONABINOL 2.5 MG: 2.5 CAPSULE ORAL at 17:50

## 2017-03-25 RX ADMIN — ASPIRIN 81 MG: 81 TABLET, COATED ORAL at 10:12

## 2017-03-25 RX ADMIN — METHYLPREDNISOLONE SODIUM SUCCINATE 60 MG: 125 INJECTION, POWDER, FOR SOLUTION INTRAMUSCULAR; INTRAVENOUS at 13:18

## 2017-03-25 RX ADMIN — OXCARBAZEPINE 300 MG: 150 TABLET ORAL at 22:13

## 2017-03-25 RX ADMIN — OXCARBAZEPINE 300 MG: 150 TABLET ORAL at 10:12

## 2017-03-25 RX ADMIN — ENOXAPARIN SODIUM 40 MG: 40 INJECTION SUBCUTANEOUS at 17:50

## 2017-03-25 RX ADMIN — LEVETIRACETAM 500 MG: 500 TABLET, FILM COATED ORAL at 10:12

## 2017-03-25 RX ADMIN — CLONAZEPAM 0.5 MG: 0.5 TABLET ORAL at 17:50

## 2017-03-25 RX ADMIN — POLYETHYLENE GLYCOL 3350 17 G: 17 POWDER, FOR SOLUTION ORAL at 10:12

## 2017-03-25 RX ADMIN — CLONAZEPAM 0.5 MG: 0.5 TABLET ORAL at 10:12

## 2017-03-25 NOTE — PROGRESS NOTES
[facility]  Pulmonary, Critical Care, and Sleep Medicine         Name: Boston Payan MRN: [id number]   : 1960 Hospital: [facility] [place]   Date: 3/25/2017        Subjective:   3/25  Continue to feel better. Less wheezing. Less anxious. \  This patient has been seen and evaluated at the request of Dr. Rolan Willett for acute asthma exacerbation. Patient is a 55 y.o. female with multiple medical problems listed below presented with s/o nonproductive cough and severe wheezing. No relief with bronchodilators , presented to ED yesterday and was admitted with asthma exacerbation. Pt had similar episodes in 2016. At that time I thought patient may have severe anxiety disorder and VCD in addition to asthma. Pt recalls converstation. Pt has been on singulair as out patient. Pt has anxiety disorder currently on lexapro. No fever or chills. No n/v/d. Able to tolerate po. Had breakfast and lunch without difficulty. Past Medical History:   Diagnosis Date    Arthritis     Asthma     Chronic kidney disease     deformed right  kidney    Diabetes ([address]     Hypertension     Migraines     PUD (peptic ulcer disease)     H/O, no meds at this time     Seizures ([address]     Unspecified sleep apnea     uses cpap      Past Surgical History:   Procedure Laterality Date    HX APPENDECTOMY      HX GYN      hysterectomy      Prior to Admission medications    Medication Sig Start Date End Date Taking? Authorizing Provider   montelukast (SINGULAIR) 10 mg tablet Take 10 mg by mouth daily. Yes Historical Provider   naproxen (NAPROSYN) 500 mg tablet Take 500 mg by mouth two (2) times daily as needed. Yes Historical Provider   ondansetron (ZOFRAN ODT) 4 mg disintegrating tablet Take 4 mg by mouth every eight (8) hours as needed for Nausea. Yes Em Gore MD   raNITIdine (ZANTAC) 150 mg tablet Take 150 mg by mouth two (2) times a day.    Yes Em Gore MD conjugated estrogens (PREMARIN) 0.9 mg tablet Take 0.9 mg by mouth daily. Yes Shena Jewell MD   fluticasone-salmeterol (ADVAIR) 250-50 mcg/dose diskus inhaler Take 1 Puff by inhalation two (2) times a day. 11/9/16  Yes Yoselin Fernandez MD   albuterol (PROVENTIL VENTOLIN) 2.5 mg /3 mL (0.083 %) nebulizer solution 3 mL by Nebulization route three (3) times daily. 11/9/16  Yes Yoselin Fernandez MD   albuterol [facility] HFA) 90 mcg/actuation inhaler Take 2 Puffs by inhalation every four (4) hours as needed for Wheezing. Yes Historical Provider   escitalopram oxalate (LEXAPRO) 10 mg tablet Take 15 mg by mouth daily. Yes Historical Provider   aspirin delayed-release 81 mg tablet Take 81 mg by mouth daily. Yes Historical Provider   levETIRAcetam (KEPPRA) 500 mg tablet Take 500 mg by mouth two (2) times a day. Yes Kirk Ryan MD   losartan-hydrochlorothiazide (HYZAAR) 100-25 mg per tablet Take 1 Tab by mouth daily. Yes Kirk Ryan MD   albuterol (PROVENTIL, VENTOLIN) 90 mcg/actuation inhaler Take 1-2 Puffs by inhalation every four (4) hours as needed for Wheezing. 12/15/12  Yes Yesica Nevarez MD   OXcarbazepine (TRILEPTAL) 300 mg tablet Take 300 mg by mouth two (2) times a day. Yes Kirk Ryan MD   acetaminophen (TYLENOL) 500 mg tablet Take 1,000 mg by mouth every six (6) hours as needed for Pain or Fever. Shena Jewell MD   SUMAtriptan succinate 6 mg/0.5 mL kit 6 mg by SubCUTAneous route once as needed for Migraine.     Kirk Ryan MD     Allergies   Allergen Reactions    Morphine Itching     Can tolerate if premed w/ benadryl    Pertussis Vaccine,Adsorbed Swelling     Swelling at injection site    Vicodin [Hydrocodone-Acetaminophen] Itching     Can tolerate if premed w/ benadryl      Social History   Substance Use Topics    Smoking status: Never Smoker    Smokeless tobacco: Not on file    Alcohol use No      Family History   Problem Relation Age of Onset    Malignant Hyperthermia Neg Hx  Pseudocholinesterase Deficiency Neg Hx     Delayed Awakening Neg Hx     Post-op Nausea/Vomiting Neg Hx     Emergence Delirium Neg Hx     Post-op Cognitive Dysfunction Neg Hx     Other Neg Hx         Current Facility-Administered Medications   Medication Dose Route Frequency    aspirin delayed-release tablet 81 mg  81 mg Oral DAILY    levETIRAcetam (KEPPRA) tablet 500 mg  500 mg Oral BID    OXcarbazepine (TRILEPTAL) tablet 300 mg  300 mg Oral BID    losartan/hydroCHLOROthiazide (HYZAAR) 100/25 mg   Oral DAILY    levoFLOXacin (LEVAQUIN) 500 mg in D5W IVPB  500 mg IntraVENous Q24H    arformoterol (BROVANA) neb solution 15 mcg  15 mcg Nebulization BID RT    budesonide (PULMICORT) 500 mcg/2 ml nebulizer suspension  500 mcg Nebulization BID RT    insulin lispro (HUMALOG) injection   SubCUTAneous AC&HS    pantoprazole (PROTONIX) tablet 40 mg  40 mg Oral ACB    methylPREDNISolone (PF) (SOLU-MEDROL) injection 60 mg  60 mg IntraVENous Q6H    montelukast (SINGULAIR) tablet 10 mg  10 mg Oral QHS    dronabinol (MARINOL) capsule 2.5 mg  2.5 mg Oral ACB&D    clonazePAM (KlonoPIN) tablet 0.5 mg  0.5 mg Oral TID    enoxaparin (LOVENOX) injection 40 mg  40 mg SubCUTAneous Q24H               Objective:   Vital Signs:    Visit Vitals    /83 (BP 1 Location: Left arm, BP Patient Position: At rest)    Pulse 79    Temp 97.8 °F (36.6 °C)    Resp 19    Ht 5' 2\" (1.575 m)    Wt 78.3 kg (172 lb 9.6 oz)    SpO2 100%    BMI 31.57 kg/m2       O2 Device: Nasal cannula   O2 Flow Rate (L/min): 2 l/min   Temp (24hrs), Av.7 °F (36.5 °C), Min:97.4 °F (36.3 °C), Max:98.3 °F (36.8 °C)       Intake/Output:     Intake/Output Summary (Last 24 hours) at 17 1136  Last data filed at 17 0357   Gross per 24 hour   Intake                0 ml   Output              500 ml   Net             -500 ml         Physical Exam:   General: alert and appropriate.    HEENT;  PERRLA  Neck: No adenopathy or thyroid swelling  CVS: S1S2 no murmurs  RS: Mod AE bilaterally,  Good excursion, scatter wheezing,  Much of the wheezing is coming for neck/vocal cord. Abd: soft, non tender, no hepatosplenomegaly  Neuro: non focal, awake,    Extrm: no leg edema   Skin: no rash    Data review:   Labs:  No results for input(s): WBC, HGB, HCT, PLT, HGBEXT, HCTEXT, PLTEXT, HGBEXT, HCTEXT, PLTEXT in the last 72 hours. Recent Labs      03/24/17   0248   CREA  0.97     No results for input(s): PH, PCO2, PO2, HCO3, FIO2 in the last 72 hours. Imaging:  I have personally reviewed the patients radiographs and have reviewed the reports: Allergies        Unspecified: Morphine;  Pertussis Vaccine,adsorbed;  Vicodin [Hydrocodone-acetaminophen]       Result Information      Status Provider Status        Final result (Exam End: 3/20/2017 12:48 PM) Open        Study Result      EXAM:Chest X-Ray      History: Shortness of breath     Technique: Portable Frontal View     Comparison: 12/23/2016, 11/7/2016     _______________     FINDINGS:  Nonspecific mild to moderate pulmonary hypoinflation degraded examination. The trachea is midline. The cardiomediastinal silhouette is midline and, within normal limits. Patchy left infrahilar opacity partially obscuring the distal left heart border,  with retained left diaphragmatic margin. Nonspecific right hemithoracic  interstitial prominence. No pneumothorax or effusion.   Intact osseous structures.     _______________     IMPRESSION  IMPRESSION:     1. Pulmonary hypoinflation degraded examination with asymmetric nonspecific  infrahilar left lower lung opacity, which may represent atelectasis/pneumonia.                   IMPRESSION:   · Acute asthma exacerbation, improving  · VCD   · Severe anxiety disorder, further exacerbated with excessive B2 agonist  · Sleep apnea on cpap at night  · ckd per hx      RECOMMENDATIONS:   · brovana and pulmicort  · Decrease the steroid  · Prn duoneb  · singulair  · Continue klonopin and marinol  · DVT, PUD prophylaxis           Fani Dickens MD

## 2017-03-25 NOTE — PROGRESS NOTES
PATIENT REQUESTED PRN NEB AFTER WORKING WITH PT.  BS WERE DIMINISHED PRIOR TO NEB TX AND PATIENT WAS TACHYPNEIC WITH RR 28. POST TX RR 20 AND BS WERE CTA.

## 2017-03-25 NOTE — PROGRESS NOTES
2305 Shift assessment. Pt drowsy, stating that she \"feels out of it. \" States that she takes clonazepam at home, and it makes her feel drowsy as she does now. Answers appropriately. Lung sounds with some wheezing. Coughs on exertion. Refused Miralax at this time because she reports diarrhea that causes her to void and have BM at the same time. Incontinence brief on per pt request.     SHIFT SUMMARY: No events. Pt states she was able to sleep. More alert this morning and hopeful for discharge.

## 2017-03-25 NOTE — PROGRESS NOTES
Problem: Mobility Impaired (Adult and Pediatric)  Goal: *Acute Goals and Plan of Care (Insert Text)  Physical Therapy Goals  Initiated 3/23/2017 and to be accomplished within 2-8 day(s)  1. Patient will move from supine to sit and sit to supine in bed with supervision/set-up. 2. Patient will transfer from bed to chair and chair to bed with supervision/set-up using the least restrictive device. 3. Patient will perform sit to stand with supervision/set-up. 4. Patient will ambulate with supervision/set-up for 100 feet with the least restrictive device. 5. Patient will ascend/descend 3 stairs with use of handrail(s) with minimal assistance/contact guard assist.   Outcome: Progressing Towards Goal  PHYSICAL THERAPY TREATMENT     Patient: Greg Escobar (89 y.o. female)  Date: 3/25/2017  Diagnosis: Asthma exacerbation Asthma exacerbation       Precautions: Fall   Chart, physical therapy assessment, plan of care and goals were reviewed. ASSESSMENT:  Pt is not is able to increase ambulation distance, but has 3 episode of Left knee buckle and required a 5 min seated rest (after 100'). Pt maintains 100% SPO2 on 2L NC. Pt has repetitive coughing, with minimal production. Moderate fatigue at end of session, with BP of 128/81. Will continue to work toward safety and stair goals. Progression toward goals:  [ ]      Improving appropriately and progressing toward goals  [X]      Improving slowly and progressing toward goals  [ ]      Not making progress toward goals and plan of care will be adjusted       PLAN:  Patient continues to benefit from skilled intervention to address the above impairments. Continue treatment per established plan of care. Discharge Recommendations:  Home Health  Further Equipment Recommendations for Discharge:  gait belt and rolling walker       SUBJECTIVE:   Patient stated I'm going to walk all the way to the nurse's station today.       OBJECTIVE DATA SUMMARY:   Critical Behavior:  Neurologic State: Alert, Appropriate for age  Orientation Level: Oriented X4  Cognition: Appropriate for age attention/concentration, Appropriate decision making, Appropriate safety awareness, Follows commands  Safety/Judgement: Awareness of environment, Fall prevention, Good awareness of safety precautions  Functional Mobility Training:  Bed Mobility:  Rolling: Supervision  Supine to Sit: Supervision  Sit to Supine: Supervision  Scooting: Supervision  Transfers:  Sit to Stand: Stand-by asssistance  Stand to Sit: Supervision  Balance:  Sitting: Intact  Standing: Intact; With support  Standing - Static: Good  Standing - Dynamic : Good  Ambulation/Gait Training:  Distance (ft): 200 Feet (ft)  Assistive Device: Gait belt;Walker, rolling  Ambulation - Level of Assistance: Contact guard assistance; Moderate assistance  Gait Abnormalities: Decreased step clearance  Base of Support: Narrowed  Stance: Time  Speed/Dafne: Slow  Step Length: Right shortened;Left shortened  Swing Pattern: Left symmetrical;Right symmetrical  Pain:  Pain Scale 1: FLACC  Pain Intensity 1: 0  Activity Tolerance:   Fair  Please refer to the flowsheet for vital signs taken during this treatment.   After treatment:   [ ] Patient left in no apparent distress sitting up in chair  [X] Patient left in no apparent distress in bed  [X] Call bell left within reach  [X] Nursing notified  [ ] Caregiver present  [ ] Bed alarm activated      Lurdes Carrillo PTA   Time Calculation: 32 mins

## 2017-03-25 NOTE — ROUTINE PROCESS
Bedside and Verbal shift change report given to CROW Rahman RN (oncoming nurse) by Kvng Arias RN  (offgoing nurse). Report included the following information SBAR, Kardex, Intake/Output and MAR.

## 2017-03-26 VITALS
OXYGEN SATURATION: 99 % | TEMPERATURE: 97.3 F | DIASTOLIC BLOOD PRESSURE: 83 MMHG | HEIGHT: 62 IN | HEART RATE: 88 BPM | SYSTOLIC BLOOD PRESSURE: 134 MMHG | RESPIRATION RATE: 16 BRPM | WEIGHT: 170.6 LBS | BODY MASS INDEX: 31.39 KG/M2

## 2017-03-26 LAB
GLUCOSE BLD STRIP.AUTO-MCNC: 116 MG/DL (ref 70–110)
GLUCOSE BLD STRIP.AUTO-MCNC: 179 MG/DL (ref 70–110)

## 2017-03-26 PROCEDURE — 74011250637 HC RX REV CODE- 250/637: Performed by: FAMILY MEDICINE

## 2017-03-26 PROCEDURE — 74011250637 HC RX REV CODE- 250/637: Performed by: HOSPITALIST

## 2017-03-26 PROCEDURE — 94640 AIRWAY INHALATION TREATMENT: CPT

## 2017-03-26 PROCEDURE — 74011250636 HC RX REV CODE- 250/636: Performed by: INTERNAL MEDICINE

## 2017-03-26 PROCEDURE — 74011250636 HC RX REV CODE- 250/636: Performed by: HOSPITALIST

## 2017-03-26 PROCEDURE — 94760 N-INVAS EAR/PLS OXIMETRY 1: CPT

## 2017-03-26 PROCEDURE — 74011000250 HC RX REV CODE- 250: Performed by: HOSPITALIST

## 2017-03-26 PROCEDURE — 97116 GAIT TRAINING THERAPY: CPT

## 2017-03-26 PROCEDURE — 82962 GLUCOSE BLOOD TEST: CPT

## 2017-03-26 PROCEDURE — 77010033678 HC OXYGEN DAILY

## 2017-03-26 PROCEDURE — 97530 THERAPEUTIC ACTIVITIES: CPT

## 2017-03-26 PROCEDURE — 74011250637 HC RX REV CODE- 250/637: Performed by: INTERNAL MEDICINE

## 2017-03-26 RX ORDER — CLONAZEPAM 0.5 MG/1
0.5 TABLET ORAL 3 TIMES DAILY
Qty: 20 TAB | Refills: 0 | Status: SHIPPED | OUTPATIENT
Start: 2017-03-26 | End: 2021-10-20

## 2017-03-26 RX ORDER — DRONABINOL 2.5 MG/1
2.5 CAPSULE ORAL
Qty: 20 CAP | Refills: 0 | Status: SHIPPED | OUTPATIENT
Start: 2017-03-26 | End: 2021-10-20

## 2017-03-26 RX ORDER — PREDNISONE 20 MG/1
TABLET ORAL
Qty: 30 TAB | Refills: 0 | Status: SHIPPED | OUTPATIENT
Start: 2017-03-26 | End: 2017-10-13

## 2017-03-26 RX ORDER — LEVOFLOXACIN 500 MG/1
500 TABLET, FILM COATED ORAL DAILY
Qty: 3 TAB | Refills: 0 | Status: SHIPPED | OUTPATIENT
Start: 2017-03-26 | End: 2017-10-13

## 2017-03-26 RX ORDER — CODEINE PHOSPHATE AND GUAIFENESIN 10; 100 MG/5ML; MG/5ML
10 SOLUTION ORAL
Qty: 200 ML | Refills: 0 | Status: SHIPPED | OUTPATIENT
Start: 2017-03-26 | End: 2017-10-13

## 2017-03-26 RX ADMIN — OXCARBAZEPINE 300 MG: 150 TABLET ORAL at 09:09

## 2017-03-26 RX ADMIN — HYDROCHLOROTHIAZIDE: 25 TABLET ORAL at 09:09

## 2017-03-26 RX ADMIN — METHYLPREDNISOLONE SODIUM SUCCINATE 60 MG: 125 INJECTION, POWDER, FOR SOLUTION INTRAMUSCULAR; INTRAVENOUS at 12:05

## 2017-03-26 RX ADMIN — LEVETIRACETAM 500 MG: 500 TABLET, FILM COATED ORAL at 09:09

## 2017-03-26 RX ADMIN — METHYLPREDNISOLONE SODIUM SUCCINATE 60 MG: 125 INJECTION, POWDER, FOR SOLUTION INTRAMUSCULAR; INTRAVENOUS at 07:06

## 2017-03-26 RX ADMIN — LEVOFLOXACIN 500 MG: 5 INJECTION, SOLUTION INTRAVENOUS at 12:05

## 2017-03-26 RX ADMIN — DRONABINOL 2.5 MG: 2.5 CAPSULE ORAL at 07:07

## 2017-03-26 RX ADMIN — CLONAZEPAM 0.5 MG: 0.5 TABLET ORAL at 09:10

## 2017-03-26 RX ADMIN — BUDESONIDE 500 MCG: 0.5 INHALANT RESPIRATORY (INHALATION) at 07:36

## 2017-03-26 RX ADMIN — ARFORMOTEROL TARTRATE 15 MCG: 15 SOLUTION RESPIRATORY (INHALATION) at 07:36

## 2017-03-26 RX ADMIN — PANTOPRAZOLE SODIUM 40 MG: 40 TABLET, DELAYED RELEASE ORAL at 07:07

## 2017-03-26 RX ADMIN — GUAIFENESIN AND CODEINE PHOSPHATE 10 ML: 100; 10 SOLUTION ORAL at 00:36

## 2017-03-26 RX ADMIN — METHYLPREDNISOLONE SODIUM SUCCINATE 60 MG: 125 INJECTION, POWDER, FOR SOLUTION INTRAMUSCULAR; INTRAVENOUS at 00:29

## 2017-03-26 RX ADMIN — ASPIRIN 81 MG: 81 TABLET, COATED ORAL at 09:10

## 2017-03-26 RX ADMIN — INSULIN LISPRO 2 UNITS: 100 INJECTION, SOLUTION INTRAVENOUS; SUBCUTANEOUS at 12:04

## 2017-03-26 NOTE — PROGRESS NOTES
Received report from Mary Ortiz. 1012 patient assessment was completed at this time. Patient states that she is not having any pain at this time. Shift summary-  Rest of shift was uneventful. . During shift report patient O2 was lowered to 1 L

## 2017-03-26 NOTE — ROUTINE PROCESS
Bedside and Verbal shift change report given to Jordan Layton RN (oncoming nurse) by Samuel Abdi RN   (offgoing nurse). Report included the following information SBAR, Kardex and MAR.

## 2017-03-26 NOTE — DISCHARGE SUMMARY
Discharge Summary    Patient: Amilcar Harmon MRN: [id number]  CSN: [id number]    YOB: 1960  Age: 55 y.o. Sex: female    DOA: 3/20/2017 LOS:  LOS: 6 days   Discharge Date:      Primary Care Provider:  Martine Dean MD    Admission Diagnoses: Asthma exacerbation    Discharge Diagnoses:    Problem List as of 3/26/2017  Never Reviewed          Codes Class Noted - Resolved    Constipation ICD-10-CM: K59.00  ICD-9-CM: 564.00  3/22/2017 - Present        Hypoxia ICD-10-CM: R09.02  ICD-9-CM: 799.02  3/22/2017 - Present        * (Principal)Asthma exacerbation ICD-10-CM: J45.901  ICD-9-CM: 493.92  11/1/2016 - Present        Tachycardia ICD-10-CM: R00.0  ICD-9-CM: 785.0  11/1/2016 - Present        HTN (hypertension) ICD-10-CM: I10  ICD-9-CM: 401.9  11/1/2016 - Present        Pneumonia ICD-10-CM: J18.9  ICD-9-CM: 486  11/1/2016 - Present        Hypokalemia ICD-10-CM: E87.6  ICD-9-CM: 276.8  11/1/2016 - Present        OPAL (acute kidney injury) ([address] ICD-10-CM: N17.9  ICD-9-CM: 584.9  11/1/2016 - Present        Acute respiratory insufficiency ([address] ICD-10-CM: R06.89  ICD-9-CM: 518.82  10/31/2016 - Present        Screen for colon cancer ICD-10-CM: Z12.11  ICD-9-CM: V76.51  12/20/2012 - Present              Discharge Medications:     Current Discharge Medication List      START taking these medications    Details   clonazePAM (KLONOPIN) 0.5 mg tablet Take 1 Tab by mouth three (3) times daily. Max Daily Amount: 1.5 mg.  Qty: 20 Tab, Refills: 0      dronabinol (MARINOL) 2.5 mg capsule Take 1 Cap by mouth Before breakfast and dinner. Max Daily Amount: 5 mg. Qty: 20 Cap, Refills: 0      guaiFENesin-codeine (ROBITUSSIN AC) 100-10 mg/5 mL solution Take 10 mL by mouth every four (4) hours as needed for Cough. Max Daily Amount: 60 mL. Qty: 200 mL, Refills: 0      levoFLOXacin (LEVAQUIN) 500 mg tablet Take 1 Tab by mouth daily.   Qty: 3 Tab, Refills: 0      predniSONE (DELTASONE) 20 mg tablet 4 po x 2 days, 3 po x 2 days, 2 po x 2 days, 1 po x 2 days, 1/2 po x 2 days. Qty: 30 Tab, Refills: 0         CONTINUE these medications which have NOT CHANGED    Details   montelukast (SINGULAIR) 10 mg tablet Take 10 mg by mouth daily. naproxen (NAPROSYN) 500 mg tablet Take 500 mg by mouth two (2) times daily as needed. Comments: Not taking currently      ondansetron (ZOFRAN ODT) 4 mg disintegrating tablet Take 4 mg by mouth every eight (8) hours as needed for Nausea. Comments: Not currenlty taking       raNITIdine (ZANTAC) 150 mg tablet Take 150 mg by mouth two (2) times a day. conjugated estrogens (PREMARIN) 0.9 mg tablet Take 0.9 mg by mouth daily. fluticasone-salmeterol (ADVAIR) 250-50 mcg/dose diskus inhaler Take 1 Puff by inhalation two (2) times a day. Qty: 1 Inhaler, Refills: 1      albuterol (PROVENTIL VENTOLIN) 2.5 mg /3 mL (0.083 %) nebulizer solution 3 mL by Nebulization route three (3) times daily. Qty: 50 Each, Refills: 0      albuterol (PROAIR HFA) 90 mcg/actuation inhaler Take 2 Puffs by inhalation every four (4) hours as needed for Wheezing.      escitalopram oxalate (LEXAPRO) 10 mg tablet Take 15 mg by mouth daily. aspirin delayed-release 81 mg tablet Take 81 mg by mouth daily. levETIRAcetam (KEPPRA) 500 mg tablet Take 500 mg by mouth two (2) times a day. losartan-hydrochlorothiazide (HYZAAR) 100-25 mg per tablet Take 1 Tab by mouth daily. albuterol (PROVENTIL, VENTOLIN) 90 mcg/actuation inhaler Take 1-2 Puffs by inhalation every four (4) hours as needed for Wheezing. Qty: 17 g, Refills: 0      OXcarbazepine (TRILEPTAL) 300 mg tablet Take 300 mg by mouth two (2) times a day. acetaminophen (TYLENOL) 500 mg tablet Take 1,000 mg by mouth every six (6) hours as needed for Pain or Fever. SUMAtriptan succinate 6 mg/0.5 mL kit 6 mg by SubCUTAneous route once as needed for Migraine.              Discharge Condition: Good    Procedures : None    Consults: Pulmonology      PHYSICAL EXAM  Visit Vitals    /83 (BP 1 Location: Right arm, BP Patient Position: At rest)    Pulse 88    Temp 97.3 °F (36.3 °C)    Resp 16    Ht 5' 2\" (1.575 m)    Wt 77.4 kg (170 lb 9.6 oz)    SpO2 99%    BMI 31.2 kg/m2     General: Awake, cooperative, no acute distress    HEENT: NC, Atraumatic. PERRLA, EOMI. Anicteric sclerae. Lungs:  CTA Bilaterally. No Wheezing/Rhonchi/Rales. Heart:  Regular  rhythm,  No murmur, No Rubs, No Gallops  Abdomen: Soft, Non distended, Non tender. +Bowel sounds,   Extremities: No c/c/e  Psych:   Not anxious or agitated. Neurologic:  No acute neurological deficits. Admission HPI :     Steven Hoffman is a 55 y.o. female who was admitted in the fall. Comes in with wheezing and dyspnea for the past 4 days. Dry cough. Hx of asthma with exacerbation requiring hospital stays. Has been intubated in the past. In fall 2016 required a couple days in the icu for close monitoring. In the ER received multiple doses of bronchodilators and magnesium. Asked to admit for continuation of care. Feels symptoms are improving with treatment received so far. Hospital Course :    Admitted and started on systemic steroid and antibiotics. Bronchodilators continued as well. Received magnesium in the ER. Pulmonary CC was consulted. Dr. Bonnie Lamb. Given Fior castellano while admitted. Continued on levofloxacin. Slow improvement noted. Last time that she was in the hospital she was here for two weeks. Significant improvement over the past couple days. Requests dc home. Dressed at the time of my visit today. O2 weaned off. Pulmonary has started Marinol and some clonazepam for her sever anxiety. She seems to be doing well. At this time looking and feeling good. Exam at this time w/o wheezing. Seems back to normal.  All questions answered. Meets criteria for safe dc.   Indicates she will follow up with her PCP and her Pulmonologist at [facility]. Activity: Activity as tolerated    Diet: Regular Diet    Follow-up: 1 week pcp. 1-2 weeks pulmonologist.     Disposition: home    Minutes spent on discharge: 48       Labs: Results:       Chemistry Recent Labs      03/24/17   0248   CREA  0.97      CBC w/Diff No results for input(s): WBC, RBC, HGB, HCT, PLT, GRANS, LYMPH, EOS, HGBEXT, HCTEXT, PLTEXT in the last 72 hours. Cardiac Enzymes No results for input(s): CPK, CKND1, YAMILKA in the last 72 hours. No lab exists for component: CKRMB, TROIP   Coagulation No results for input(s): PTP, INR, APTT in the last 72 hours. No lab exists for component: INREXT    Lipid Panel No results found for: CHOL, CHOLPOCT, CHOLX, CHLST, CHOLV, [id number], HDL, LDL, NLDLCT, DLDL, LDLC, DLDLP, [id number], VLDLC, VLDL, TGL, TGLX, TRIGL, [id number], TRIGP, TGLPOCT, [id number], CHHD, CHHDX   BNP No results for input(s): BNPP in the last 72 hours. Liver Enzymes No results for input(s): TP, ALB, TBIL, AP, SGOT, GPT in the last 72 hours. No lab exists for component: DBIL   Thyroid Studies No results found for: T4, T3U, TSH, TSHEXT         Significant Diagnostic Studies: Xr Chest Port    Result Date: 3/20/2017  EXAM:Chest X-Ray  History: Shortness of breath Technique:  Portable Frontal View Comparison: 12/23/2016, 11/7/2016 _______________ FINDINGS: Nonspecific mild to moderate pulmonary hypoinflation degraded examination. The trachea is midline. The cardiomediastinal silhouette is midline and, within normal limits. Patchy left infrahilar opacity partially obscuring the distal left heart border, with retained left diaphragmatic margin. Nonspecific right hemithoracic interstitial prominence. No pneumothorax or effusion. Intact osseous structures. _______________     IMPRESSION: 1. Pulmonary hypoinflation degraded examination with asymmetric nonspecific infrahilar left lower lung opacity, which may represent atelectasis/pneumonia.          No results found for this or any previous visit.        CC: Colin Delgado MD

## 2017-03-26 NOTE — PROGRESS NOTES
Shift summary: Patient A/Ox4. Bilateral lungs coarse, scattered wheezing. Productive cough present, thick yellow sputum. Robitussain AC given. Denied pain. Up with assistance using walker.      Patient Vitals for the past 12 hrs:   Temp Pulse Resp BP SpO2   03/26/17 0411 97.3 °F (36.3 °C) 68 16 158/76 100 %   03/25/17 2328 97.4 °F (36.3 °C) 74 16 140/77 96 %   03/25/17 2047 97.3 °F (36.3 °C) 76 18 144/84 96 %   03/25/17 2039 - - - - 98 %

## 2017-03-26 NOTE — PROGRESS NOTES
0752 - Bedside report received from Arcelia Joseph RN. Patient A&O x4. Resting comfortably with no c/o pain, sob, distress noted. Plan of care for today is IV ABX, IV STEROIDS, PT, POSSIBLE DISCHARGE.    1230 - PT working with patient. 1430 - Received orders for discharge. Pt will have prescriptions. Patient Vitals for the past 12 hrs:   Temp Pulse Resp BP SpO2   03/26/17 1449 97.3 °F (36.3 °C) 88 16 134/83 99 %   03/26/17 1137 98.1 °F (36.7 °C) 93 16 135/81 100 %   03/26/17 0751 97.2 °F (36.2 °C) 91 16 134/83 98 %   03/26/17 0411 97.3 °F (36.3 °C) 68 16 158/76 100 %       Dual AVS reviewed with Christelle Simmons RN. All medications reviewed individually with patient. Opportunities for questions and concerns provided. Patient discharged via Car. Patient's arm band appropriately discarded.

## 2017-03-26 NOTE — PROGRESS NOTES
Problem: Mobility Impaired (Adult and Pediatric)  Goal: *Acute Goals and Plan of Care (Insert Text)  Physical Therapy Goals  Initiated 3/23/2017 and to be accomplished within 2-8 day(s)  1. Patient will move from supine to sit and sit to supine in bed with supervision/set-up. 2. Patient will transfer from bed to chair and chair to bed with supervision/set-up using the least restrictive device. 3. Patient will perform sit to stand with supervision/set-up. 4. Patient will ambulate with supervision/set-up for 100 feet with the least restrictive device. 5. Patient will ascend/descend 3 stairs with use of handrail(s) with minimal assistance/contact guard assist.   Outcome: Resolved/Met Date Met:  03/26/17  PHYSICAL THERAPY TREATMENT/DISCHARGE     Patient: Maria Elena Murdock (27 y.o. female)  Date: 3/26/2017  Diagnosis: Asthma exacerbation Asthma exacerbation       Precautions: Fall  Chart, physical therapy assessment, plan of care and goals were reviewed. ASSESSMENT:  Pt meets needs for safe home mobility and is cleared from this level of PT. Less cough and 1 standing rest break taken. Progression toward goals:  [X]      Goals met  [ ]      Improving appropriately and progressing toward goals  [ ]      Improving slowly and progressing toward goals  [ ]      Not making progress toward goals and plan of care will be adjusted       PLAN:  Patient will be discharged from physical therapy at this time.   Rationale for discharge:  [X] Goals Achieved  [ ] Alberteen Rubinstein  [ ] Patient not participating in therapy  [ ] Other:  Discharge Recommendations:  Home Health  Further Equipment Recommendations for Discharge:  bedside commode        SUBJECTIVE:   Patient stated I'm going home at 3 Pm .      OBJECTIVE DATA SUMMARY:   Critical Behavior:  Neurologic State: Alert, Appropriate for age  Orientation Level: Oriented X4  Cognition: Appropriate for age attention/concentration, Appropriate decision making, Appropriate safety awareness, Follows commands  Safety/Judgement: Awareness of environment, Fall prevention, Good awareness of safety precautions  Functional Mobility Training:  Bed Mobility:  Rolling: Supervision  Supine to Sit: Supervision  Sit to Supine: Supervision  Scooting: Supervision  Transfers:  Sit to Stand: Supervision  Stand to Sit: Supervision  Balance:  Sitting: Intact  Standing: Intact; With support  Standing - Static: Good  Standing - Dynamic : Good  Ambulation/Gait Training:  Distance (ft): 240 Feet (ft)  Assistive Device: Gait belt;Walker, rolling  Ambulation - Level of Assistance: Contact guard assistance; Moderate assistance  Base of Support: Narrowed  Stance: Time  Speed/Dafne: Slow  Step Length: Right shortened;Left shortened  Swing Pattern: Left symmetrical;Right symmetrical  Stairs:  Number of Stairs Trained: 4  Stairs - Level of Assistance: Minimum assistance (HHA)              Rail Use: Right   Pain:  Pain Scale 1: Numeric (0 - 10)  Pain Intensity 1: 0  Activity Tolerance:   Good  Please refer to the flowsheet for vital signs taken during this treatment.   After treatment:   [X] Patient left in no apparent distress sitting EOB  [ ] Patient left in no apparent distress in bed  [X] Call bell left within reach  [X] Nursing notified  [ ] Caregiver present  [ ] Bed alarm activated  Rosie Carrero PTA   Time Calculation: 38 mins

## 2017-03-26 NOTE — DISCHARGE INSTRUCTIONS
Lab Results   Component Value Date/Time    Hemoglobin A1c 6.0 03/21/2017 11:42 AM           This lab test reflects that your blood sugar has been slightly elevated over the past 3 months and should be evaluated by your primary care provider. An A1C of 5.7-6.4% meets the criteria for pre-diabetes; an A1C of 6.5% or higher meets the criteria for diabetes. Steroids can increase your blood sugar so it is important to follow up with your provider to determine if your blood sugar has returned to normal or needs further treatment. This lab test reflects that your blood sugar averaged 125 mg/dl  over the past 3 months. It is important to follow up with your provider on a routine basis to continue to evaluate your blood sugar and discuss any necessary changes in treatment. DISCHARGE SUMMARY from Nurse    The following personal items are in your possession at time of discharge:    Dental Appliances: None  Visual Aid: Glasses, With patient     Home Medications: None  Jewelry: Bracelet  Clothing: At bedside  Other Valuables: Cell Phone             PATIENT INSTRUCTIONS:    After general anesthesia or intravenous sedation, for 24 hours or while taking prescription Narcotics:  · Limit your activities  · Do not drive and operate hazardous machinery  · Do not make important personal or business decisions  · Do  not drink alcoholic beverages  · If you have not urinated within 8 hours after discharge, please contact your surgeon on call.     Report the following to your surgeon:  · Excessive pain, swelling, redness or odor of or around the surgical area  · Temperature over 100.5  · Nausea and vomiting lasting longer than 4 hours or if unable to take medications  · Any signs of decreased circulation or nerve impairment to extremity: change in color, persistent  numbness, tingling, coldness or increase pain  · Any questions        What to do at Home:  Recommended activity: Activity as tolerated and no driving for today.    *  Please give a list of your current medications to your Primary Care Provider. *  Please update this list whenever your medications are discontinued, doses are      changed, or new medications (including over-the-counter products) are added. *  Please carry medication information at all times in case of emergency situations. These are general instructions for a healthy lifestyle:    No smoking/ No tobacco products/ Avoid exposure to second hand smoke    Surgeon General's Warning:  Quitting smoking now greatly reduces serious risk to your health. Obesity, smoking, and sedentary lifestyle greatly increases your risk for illness    A healthy diet, regular physical exercise & weight monitoring are important for maintaining a healthy lifestyle    You may be retaining fluid if you have a history of heart failure or if you experience any of the following symptoms:  Weight gain of 3 pounds or more overnight or 5 pounds in a week, increased swelling in our hands or feet or shortness of breath while lying flat in bed. Please call your doctor as soon as you notice any of these symptoms; do not wait until your next office visit. Recognize signs and symptoms of STROKE:    F-face looks uneven    A-arms unable to move or move unevenly    S-speech slurred or non-existent    T-time-call 911 as soon as signs and symptoms begin-DO NOT go       Back to bed or wait to see if you get better-TIME IS BRAIN. Warning Signs of HEART ATTACK     Call 911 if you have these symptoms:   Chest discomfort. Most heart attacks involve discomfort in the center of the chest that lasts more than a few minutes, or that goes away and comes back. It can feel like uncomfortable pressure, squeezing, fullness, or pain.  Discomfort in other areas of the upper body. Symptoms can include pain or discomfort in one or both arms, the back, neck, jaw, or stomach.  Shortness of breath with or without chest discomfort.    Other signs may include breaking out in a cold sweat, nausea, or lightheadedness. Don't wait more than five minutes to call 911 - MINUTES MATTER! Fast action can save your life. Calling 911 is almost always the fastest way to get lifesaving treatment. Emergency Medical Services staff can begin treatment when they arrive -- up to an hour sooner than if someone gets to the hospital by car. The discharge information has been reviewed with the patient and spouse. The patient and spouse verbalized understanding. Discharge medications reviewed with the patient and spouse and appropriate educational materials and side effects teaching were provided. Patient armband removed and shredded. Learning About Asthma Triggers  What are triggers? When you have asthma, certain things can make your symptoms worse. These are called triggers. They include:  · Cigarette smoke or air pollution. · Things you are allergic to, such as:  ¨ Pollen, mold, or dust mites. ¨ Pet hair, skin, or saliva. · Illnesses, like colds, flu, or pneumonia. · Exercise. · Dry, cold air. How do triggers affect asthma? Triggers can make it harder for your lungs to work as they should and can lead to sudden difficulty breathing and other symptoms. When you are around a trigger, an asthma attack is more likely. If your symptoms are severe, you may need emergency treatment or have to go to the hospital for treatment. If you know what your triggers are and can avoid them, you may be able to prevent asthma attacks, reduce how often you have them, and make them less severe. What can you do to avoid triggers? The first thing is to know your triggers. When you are having symptoms, note the things around you that might be causing them. Then look for patterns in what may be triggering your symptoms. Record your triggers on a piece of paper or in an asthma diary.  When you have your list of possible triggers, work with your doctor to find ways to avoid them. You also can check how well your lungs are working by measuring your peak expiratory flow (PEF) throughout the day. Your PEF may drop when you are near things that trigger symptoms. Here are some ways to avoid a few common triggers. · Do not smoke or allow others to smoke around you. If you need help quitting, talk to your doctor about stop-smoking programs and medicines. These can increase your chances of quitting for good. · If there is a lot of pollution, pollen, or dust outside, stay at home and keep your windows closed. Use an air conditioner or air filter in your home. Check your local weather report or newspaper for air quality and pollen reports. · Get a flu shot every year. Talk to your doctor about getting a pneumococcal shot. Wash your hands often to prevent infections. · Avoid exercising outdoors in cold weather. If you are outdoors in cold weather, wear a scarf around your face and breathe through your nose. How can you manage an asthma attack? · If you have an asthma action plan, follow the plan. In general:  ¨ Use your quick-relief inhaler as directed by your doctor. If your symptoms do not get better after you use your medicine, have someone take you to the emergency room. Call an ambulance if needed. ¨ If your doctor has given you other inhaled medicines or steroid pills, take them as directed. Where can you learn more? Go to [employer]  Enter M564 in the search box to learn more about \"Learning About Asthma Triggers. \"   © 2648-8283 [employer], [employer]orporated. Care instructions adapted under license by Martha Guajardo (which disclaims liability or warranty for this information).  This care instruction is for use with your licensed healthcare professional. If you have questions about a medical condition or this instruction, always ask your healthcare professional. [place] [address] any warranty or liability for your use of this information. Content Version: 14.5.603725;  Last Revised: February 23, 2012

## 2017-10-13 ENCOUNTER — APPOINTMENT (OUTPATIENT)
Dept: ULTRASOUND IMAGING | Age: 57
End: 2017-10-13
Attending: EMERGENCY MEDICINE
Payer: COMMERCIAL

## 2017-10-13 ENCOUNTER — HOSPITAL ENCOUNTER (EMERGENCY)
Age: 57
Discharge: HOME | End: 2017-10-13
Attending: EMERGENCY MEDICINE | Admitting: EMERGENCY MEDICINE
Payer: COMMERCIAL

## 2017-10-13 VITALS
HEART RATE: 69 BPM | DIASTOLIC BLOOD PRESSURE: 72 MMHG | BODY MASS INDEX: 30 KG/M2 | OXYGEN SATURATION: 100 % | WEIGHT: 163 LBS | TEMPERATURE: 98.1 F | RESPIRATION RATE: 20 BRPM | HEIGHT: 62 IN | SYSTOLIC BLOOD PRESSURE: 140 MMHG

## 2017-10-13 DIAGNOSIS — N30.01 ACUTE CYSTITIS WITH HEMATURIA: ICD-10-CM

## 2017-10-13 DIAGNOSIS — N93.9 VAGINAL BLEEDING: Primary | ICD-10-CM

## 2017-10-13 LAB
ALBUMIN SERPL-MCNC: 4 G/DL (ref 3.4–5)
ALBUMIN/GLOB SERPL: 1.3 {RATIO} (ref 0.8–1.7)
ALP SERPL-CCNC: 61 U/L (ref 45–117)
ALT SERPL-CCNC: 16 U/L (ref 13–56)
ANION GAP SERPL CALC-SCNC: 3 MMOL/L (ref 3–18)
APPEARANCE UR: ABNORMAL
AST SERPL-CCNC: 15 U/L (ref 15–37)
BACTERIA URNS QL MICRO: ABNORMAL /HPF
BASOPHILS # BLD: 0 K/UL (ref 0–0.06)
BASOPHILS NFR BLD: 0 % (ref 0–2)
BILIRUB SERPL-MCNC: 0.3 MG/DL (ref 0.2–1)
BILIRUB UR QL: NEGATIVE
BUN SERPL-MCNC: 11 MG/DL (ref 7–18)
BUN/CREAT SERPL: 12 (ref 12–20)
CALCIUM SERPL-MCNC: 9.2 MG/DL (ref 8.5–10.1)
CHLORIDE SERPL-SCNC: 102 MMOL/L (ref 100–108)
CO2 SERPL-SCNC: 36 MMOL/L (ref 21–32)
COLOR UR: YELLOW
CREAT SERPL-MCNC: 0.95 MG/DL (ref 0.6–1.3)
DIFFERENTIAL METHOD BLD: ABNORMAL
EOSINOPHIL # BLD: 0.2 K/UL (ref 0–0.4)
EOSINOPHIL NFR BLD: 4 % (ref 0–5)
EPITH CASTS URNS QL MICRO: ABNORMAL /LPF (ref 0–5)
ERYTHROCYTE [DISTWIDTH] IN BLOOD BY AUTOMATED COUNT: 12.9 % (ref 11.6–14.5)
GLOBULIN SER CALC-MCNC: 3.2 G/DL (ref 2–4)
GLUCOSE SERPL-MCNC: 90 MG/DL (ref 74–99)
GLUCOSE UR STRIP.AUTO-MCNC: NEGATIVE MG/DL
HCT VFR BLD AUTO: 34.5 % (ref 35–45)
HGB BLD-MCNC: 11.7 G/DL (ref 12–16)
HGB UR QL STRIP: ABNORMAL
KETONES UR QL STRIP.AUTO: NEGATIVE MG/DL
LEUKOCYTE ESTERASE UR QL STRIP.AUTO: ABNORMAL
LYMPHOCYTES # BLD: 1.2 K/UL (ref 0.9–3.6)
LYMPHOCYTES NFR BLD: 22 % (ref 21–52)
MCH RBC QN AUTO: 31.1 PG (ref 24–34)
MCHC RBC AUTO-ENTMCNC: 33.9 G/DL (ref 31–37)
MCV RBC AUTO: 91.8 FL (ref 74–97)
MONOCYTES # BLD: 0.3 K/UL (ref 0.05–1.2)
MONOCYTES NFR BLD: 6 % (ref 3–10)
NEUTS SEG # BLD: 3.6 K/UL (ref 1.8–8)
NEUTS SEG NFR BLD: 68 % (ref 40–73)
NITRITE UR QL STRIP.AUTO: NEGATIVE
PH UR STRIP: 8 [PH] (ref 5–8)
PLATELET # BLD AUTO: 417 K/UL (ref 135–420)
PMV BLD AUTO: 10 FL (ref 9.2–11.8)
POTASSIUM SERPL-SCNC: 3.4 MMOL/L (ref 3.5–5.5)
PROT SERPL-MCNC: 7.2 G/DL (ref 6.4–8.2)
PROT UR STRIP-MCNC: ABNORMAL MG/DL
RBC # BLD AUTO: 3.76 M/UL (ref 4.2–5.3)
RBC #/AREA URNS HPF: ABNORMAL /HPF (ref 0–5)
SERVICE CMNT-IMP: NORMAL
SODIUM SERPL-SCNC: 141 MMOL/L (ref 136–145)
SP GR UR REFRACTOMETRY: 1.02 (ref 1–1.03)
UROBILINOGEN UR QL STRIP.AUTO: 0.2 EU/DL (ref 0.2–1)
WBC # BLD AUTO: 5.3 K/UL (ref 4.6–13.2)
WBC URNS QL MICRO: ABNORMAL /HPF (ref 0–5)
WET PREP GENITAL: NORMAL

## 2017-10-13 PROCEDURE — 99283 EMERGENCY DEPT VISIT LOW MDM: CPT

## 2017-10-13 PROCEDURE — 87210 SMEAR WET MOUNT SALINE/INK: CPT | Performed by: EMERGENCY MEDICINE

## 2017-10-13 PROCEDURE — 85025 COMPLETE CBC W/AUTO DIFF WBC: CPT | Performed by: EMERGENCY MEDICINE

## 2017-10-13 PROCEDURE — 76830 TRANSVAGINAL US NON-OB: CPT

## 2017-10-13 PROCEDURE — 87491 CHLMYD TRACH DNA AMP PROBE: CPT | Performed by: EMERGENCY MEDICINE

## 2017-10-13 PROCEDURE — 80053 COMPREHEN METABOLIC PANEL: CPT | Performed by: EMERGENCY MEDICINE

## 2017-10-13 PROCEDURE — 81001 URINALYSIS AUTO W/SCOPE: CPT | Performed by: EMERGENCY MEDICINE

## 2017-10-13 RX ORDER — NITROFURANTOIN 25; 75 MG/1; MG/1
100 CAPSULE ORAL 2 TIMES DAILY
Qty: 6 CAP | Refills: 0 | Status: SHIPPED | OUTPATIENT
Start: 2017-10-13 | End: 2017-10-13

## 2017-10-13 RX ORDER — NITROFURANTOIN 25; 75 MG/1; MG/1
100 CAPSULE ORAL 2 TIMES DAILY
Qty: 6 CAP | Refills: 0 | Status: SHIPPED | OUTPATIENT
Start: 2017-10-13 | End: 2017-10-16

## 2017-10-13 NOTE — ED TRIAGE NOTES
Patient states that she had a hysterectomy 20 years ago and started with vaginal bleeding last night. Patient states that the bleeding is lighter this am. Sepsis Screening completed    (  )Patient meets SIRS criteria. ( x )Patient does not meet SIRS criteria.       SIRS Criteria is achieved when two or more of the following are present   Temperature < 96.8°F (36°C) or > 100.9°F (38.3°C)   Heart Rate > 90 beats per minute   Respiratory Rate > 20 breaths per minute   WBC count > 12,000 or <4,000 or > 10% bands

## 2017-10-13 NOTE — ED PROVIDER NOTES
[address]  EMERGENCY DEPARTMENT HISTORY AND PHYSICAL EXAM       Date: 10/13/2017   Patient Name: Sae Kat   YOB: 1960  Medical Record Number: [id number]    History of Presenting Illness     Chief Complaint   Patient presents with    Vaginal Bleeding        History Provided By:  patient    Additional History: 11:50 AM  Sae Kat is a 65 y.o. female with surgical hx of total hysterectomy who presents to the emergency department C/O vaginal bleeding onset yesterday. States she has used 2 pads since this morning. Associated sxs include lower back pain, frequent urination. Reports she is sexually active; no injury. Pt is followed by OB/GYN; last pelvic exam was 2-3 years ago. PMHx includes asthma, seizures (controlled with Keppra), HTN, and ovarian cancer. Denies abdominal pain and any other sxs or complaints. Primary Care Provider: Gladys Brenner MD   Specialist:    Past History     Past Medical History:   Past Medical History:   Diagnosis Date    Arthritis     Asthma     Chronic kidney disease     deformed right  kidney    Diabetes ([address]     Hypertension     Migraines     PUD (peptic ulcer disease)     H/O, no meds at this time 2016    Seizures ([address]     Unspecified sleep apnea     uses cpap        Past Surgical History:   Past Surgical History:   Procedure Laterality Date    HX APPENDECTOMY      HX GYN      hysterectomy        Family History:   Family History   Problem Relation Age of Onset    Malignant Hyperthermia Neg Hx     Pseudocholinesterase Deficiency Neg Hx     Delayed Awakening Neg Hx     Post-op Nausea/Vomiting Neg Hx     Emergence Delirium Neg Hx     Post-op Cognitive Dysfunction Neg Hx     Other Neg Hx         Social History:   Social History   Substance Use Topics    Smoking status: Never Smoker    Smokeless tobacco: Never Used    Alcohol use No        Allergies:    Allergies   Allergen Reactions    Morphine Itching Can tolerate if premed w/ benadryl    Pertussis Vaccine,Adsorbed Swelling     Swelling at injection site    Vicodin [Hydrocodone-Acetaminophen] Itching     Can tolerate if premed w/ benadryl        Review of Systems   Review of Systems   Gastrointestinal: Negative for abdominal pain. Genitourinary: Positive for frequency and vaginal bleeding. Musculoskeletal: Positive for back pain. All other systems reviewed and are negative. Physical Exam  Vitals:    10/13/17 1140   BP: 140/72   Pulse: 69   Resp: 20   Temp: 98.1 °F (36.7 °C)   SpO2: 100%   Weight: 73.9 kg (163 lb)   Height: 5' 2\" (1.575 m)       Physical Exam   Nursing note and vitals reviewed. Constitutional: Well appearing, no acute distress  Head: Normocephalic, Atraumatic  Neck: Supple  Cardiovascular: Regular rate and rhythm, no murmurs, rubs, or gallops  Chest: Normal work of breathing and chest excursion bilaterally  Lungs: Clear to ausculation bilaterally  Abdomen: Soft, non tender, non distended, normoactive bowel sounds  Pelvic:  See exam below in procedures  Back: No evidence of trauma or deformity  Extremities: No evidence of trauma or deformity, no LE edema  Skin: Warm and dry  Neuro: Alert and appropriate  Psychiatric: Normal mood and affect    Diagnostic Study Results     Labs -      Recent Results (from the past 12 hour(s))   CBC WITH AUTOMATED DIFF    Collection Time: 10/13/17 12:50 PM   Result Value Ref Range    WBC 5.3 4.6 - 13.2 K/uL    RBC 3.76 (L) 4.20 - 5.30 M/uL    HGB 11.7 (L) 12.0 - 16.0 g/dL    HCT 34.5 (L) 35.0 - 45.0 %    MCV 91.8 74.0 - 97.0 FL    MCH 31.1 24.0 - 34.0 PG    MCHC 33.9 31.0 - 37.0 g/dL    RDW 12.9 11.6 - 14.5 %    PLATELET 280 731 - 607 K/uL    MPV 10.0 9.2 - 11.8 FL    NEUTROPHILS 68 40 - 73 %    LYMPHOCYTES 22 21 - 52 %    MONOCYTES 6 3 - 10 %    EOSINOPHILS 4 0 - 5 %    BASOPHILS 0 0 - 2 %    ABS. NEUTROPHILS 3.6 1.8 - 8.0 K/UL    ABS. LYMPHOCYTES 1.2 0.9 - 3.6 K/UL    ABS.  MONOCYTES 0.3 0.05 - 1.2 K/UL    ABS. EOSINOPHILS 0.2 0.0 - 0.4 K/UL    ABS. BASOPHILS 0.0 0.0 - 0.06 K/UL    DF AUTOMATED     METABOLIC PANEL, COMPREHENSIVE    Collection Time: 10/13/17 12:50 PM   Result Value Ref Range    Sodium 141 136 - 145 mmol/L    Potassium 3.4 (L) 3.5 - 5.5 mmol/L    Chloride 102 100 - 108 mmol/L    CO2 36 (H) 21 - 32 mmol/L    Anion gap 3 3.0 - 18 mmol/L    Glucose 90 74 - 99 mg/dL    BUN 11 7.0 - 18 MG/DL    Creatinine 0.95 0.6 - 1.3 MG/DL    BUN/Creatinine ratio 12 12 - 20      GFR est AA >60 >60 ml/min/1.73m2    GFR est non-AA >60 >60 ml/min/1.73m2    Calcium 9.2 8.5 - 10.1 MG/DL    Bilirubin, total 0.3 0.2 - 1.0 MG/DL    ALT (SGPT) 16 13 - 56 U/L    AST (SGOT) 15 15 - 37 U/L    Alk. phosphatase 61 45 - 117 U/L    Protein, total 7.2 6.4 - 8.2 g/dL    Albumin 4.0 3.4 - 5.0 g/dL    Globulin 3.2 2.0 - 4.0 g/dL    A-G Ratio 1.3 0.8 - 1.7     URINALYSIS W/ RFLX MICROSCOPIC    Collection Time: 10/13/17 12:50 PM   Result Value Ref Range    Color YELLOW      Appearance CLOUDY      Specific gravity 1.016 1.005 - 1.030      pH (UA) 8.0 5.0 - 8.0      Protein TRACE (A) NEG mg/dL    Glucose NEGATIVE  NEG mg/dL    Ketone NEGATIVE  NEG mg/dL    Bilirubin NEGATIVE  NEG      Blood LARGE (A) NEG      Urobilinogen 0.2 0.2 - 1.0 EU/dL    Nitrites NEGATIVE  NEG      Leukocyte Esterase LARGE (A) NEG     URINE MICROSCOPIC ONLY    Collection Time: 10/13/17 12:50 PM   Result Value Ref Range    WBC 31 to 40 0 - 5 /hpf    RBC 31 to 40 0 - 5 /hpf    Epithelial cells FEW 0 - 5 /lpf    Bacteria FEW (A) NEG /hpf   WET PREP    Collection Time: 10/13/17  1:35 PM   Result Value Ref Range    Special Requests: NO SPECIAL REQUESTS      Wet prep NO YEAST,TRICHOMONAS OR CLUE CELLS NOTED         Radiologic Studies -  The following have been ordered and reviewed:  US TRANSVAGINAL   Final Result   IMPRESSION: Status post hysterectomy and bilateral nephrectomy. No sonographic   abnormality identified in the pelvis.    As read by the radiologist. Medical Decision Making   I am the first provider for this patient. I reviewed the vital signs, available nursing notes, past medical history, past surgical history, family history and social history. Vital Signs-Reviewed the patient's vital signs. Patient Vitals for the past 12 hrs:   Temp Pulse Resp BP SpO2   10/13/17 1140 98.1 °F (36.7 °C) 69 20 140/72 100 %       Pulse Oximetry Analysis - Normal 100% on RA     Old Medical Records: Nursing notes. Procedures:   Procedures    1:35 PM Exam was chaperoned by Nusrat Yuan ED Tech  Normal external genitalia. Scant white discharge in the vaginal vault. Cervix surgically absent. On bimanual exam,  tender 2 cm mass on right lateral aspect of vaginal vault. No bleeding. ED Course:  11:50 AM  Initial assessment performed. The patients presenting problems have been discussed, and they are in agreement with the care plan formulated and outlined with them. I have encouraged them to ask questions as they arise throughout their visit. Medications Given in the ED:  Medications - No data to display    Discharge Note:  4:46 PM  Pt has been reexamined. Patient has no new complaints, changes, or physical findings. Care plan outlined and precautions discussed. Results were reviewed with the patient. All medications were reviewed with the patient; will d/c home. All of pt's questions and concerns were addressed. Patient was instructed and agrees to follow up with OBGYN, as well as to return to the ED upon further deterioration. Patient is ready to go home. Diagnosis   Clinical Impression:   1. Vaginal bleeding    2. Acute cystitis with hematuria         Discussion:  65 y.o. famale presenting with vaginal bleeding. On exam there is small mass on lateral wall of vaginal vault, no bleeding. Pt has UTI. No clear pathology on US. Plan for antibiotics and discharge with early OBGYN follow up for mass felt on vagina. Pt understands and agrees with plan. Follow-up Information     Follow up With Details Comments Contact Info    Kellie Rollins MD Schedule an appointment as soon as possible for a visit in 2 days  [address] [facility] [place] Fel[place]o [phone]      Yvonne Rosario MD Schedule an appointment as soon as possible for a visit in 2 days  [address] [address]  [phone]      [facility]  As needed, If symptoms worsen [address] [place] [address]  [phone]          Discharge Medication List as of 10/13/2017  4:47 PM      START taking these medications    Details   nitrofurantoin, macrocrystal-monohydrate, (MACROBID) 100 mg capsule Take 1 Cap by mouth two (2) times a day for 3 days. , Normal, Disp-6 Cap, R-0         CONTINUE these medications which have NOT CHANGED    Details   clonazePAM (KLONOPIN) 0.5 mg tablet Take 1 Tab by mouth three (3) times daily. Max Daily Amount: 1.5 mg., Print, Disp-20 Tab, R-0      dronabinol (MARINOL) 2.5 mg capsule Take 1 Cap by mouth Before breakfast and dinner. Max Daily Amount: 5 mg., Print, Disp-20 Cap, R-0      montelukast (SINGULAIR) 10 mg tablet Take 10 mg by mouth daily. , Historical Med      naproxen (NAPROSYN) 500 mg tablet Take 500 mg by mouth two (2) times daily as needed., Historical MedNot taking currently      ondansetron (ZOFRAN ODT) 4 mg disintegrating tablet Take 4 mg by mouth every eight (8) hours as needed for Nausea., Historical MedNot currenlty taking       raNITIdine (ZANTAC) 150 mg tablet Take 150 mg by mouth two (2) times a day., Historical Med      conjugated estrogens (PREMARIN) 0.9 mg tablet Take 0.9 mg by mouth daily. , Historical Med      acetaminophen (TYLENOL) 500 mg tablet Take 1,000 mg by mouth every six (6) hours as needed for Pain or Fever., Historical Med      fluticasone-salmeterol (ADVAIR) 250-50 mcg/dose diskus inhaler Take 1 Puff by inhalation two (2) times a day., Print, Disp-1 Inhaler, R-1      albuterol (PROVENTIL VENTOLIN) 2.5 mg /3 mL (0.083 %) nebulizer solution 3 mL by Nebulization route three (3) times daily. , Print, Disp-50 Each, R-0      albuterol (PROAIR HFA) 90 mcg/actuation inhaler Take 2 Puffs by inhalation every four (4) hours as needed for Wheezing., Historical Med      escitalopram oxalate (LEXAPRO) 10 mg tablet Take 15 mg by mouth daily. , Historical Med      aspirin delayed-release 81 mg tablet Take 81 mg by mouth daily. , Historical Med      levETIRAcetam (KEPPRA) 500 mg tablet Take 500 mg by mouth two (2) times a day., Historical Med      SUMAtriptan succinate 6 mg/0.5 mL kit 6 mg by SubCUTAneous route once as needed for Migraine., Historical Med      losartan-hydrochlorothiazide (HYZAAR) 100-25 mg per tablet Take 1 Tab by mouth daily. , Historical Med      albuterol (PROVENTIL, VENTOLIN) 90 mcg/actuation inhaler Take 1-2 Puffs by inhalation every four (4) hours as needed for Wheezing., Print, Disp-17 g, R-0      OXcarbazepine (TRILEPTAL) 300 mg tablet Take 300 mg by mouth two (2) times a day., Historical Med             _______________________________   Attestations: This note is prepared by Jennifer Santiago and Adam Corral, acting as a Scribe for Vivian Tapia MD on 11:47 AM on 10/13/2017 . Vivian Tapia MD: The scribe's documentation has been prepared under my direction and personally reviewed by me in its entirety.   _______________________________

## 2017-10-13 NOTE — DISCHARGE INSTRUCTIONS
Abnormal Uterine Bleeding: Care Instructions  Your Care Instructions  Abnormal uterine bleeding (AUB) is irregular bleeding from the uterus that is longer or heavier than usual or does not occur at your regular time. Sometimes it is caused by changes in hormone levels. It can also be caused by growths in the uterus, such as fibroids or polyps. Sometimes a cause cannot be found. You may have heavy bleeding when you are not expecting your period. Your doctor may suggest a pregnancy test, if you think you are pregnant. Follow-up care is a key part of your treatment and safety. Be sure to make and go to all appointments, and call your doctor if you are having problems. It's also a good idea to know your test results and keep a list of the medicines you take. How can you care for yourself at home? · Be safe with medicines. Take pain medicines exactly as directed. ¨ If the doctor gave you a prescription medicine for pain, take it as prescribed. ¨ If you are not taking a prescription pain medicine, ask your doctor if you can take an over-the-counter medicine. · You may be low in iron because of blood loss. Eat a balanced diet that is high in iron and vitamin C. Foods rich in iron include red meat, shellfish, eggs, beans, and leafy green vegetables. Talk to your doctor about whether you need to take iron pills or a multivitamin. When should you call for help? Call 911 anytime you think you may need emergency care. For example, call if:  · You passed out (lost consciousness). Call your doctor now or seek immediate medical care if:  · You have sudden, severe pain in your belly or pelvis. · You have severe vaginal bleeding. You are soaking through your usual pads or tampons every hour for 2 or more hours. · You feel dizzy or lightheaded, or you feel like you may faint. Watch closely for changes in your health, and be sure to contact your doctor if:  · You have new belly or pelvic pain.   · You have a fever.  · Your bleeding gets worse or lasts longer than 1 week. · You think you may be pregnant. Where can you learn more? Go to http://paul-juan manuel.info/. Enter H908 in the search box to learn more about \"Abnormal Uterine Bleeding: Care Instructions. \"  Current as of: October 13, 2016  Content Version: 11.3  © 8769-7378 [employer]. Care instructions adapted under license by [employer] (which disclaims liability or warranty for this information). If you have questions about a medical condition or this instruction, always ask your healthcare professional. [place] [address] any warranty or liability for your use of this information. Urinary Tract Infection in Women: Care Instructions  Your Care Instructions    A urinary tract infection, or UTI, is a general term for an infection anywhere between the kidneys and the urethra (where urine comes out). Most UTIs are bladder infections. They often cause pain or burning when you urinate. UTIs are caused by bacteria and can be cured with antibiotics. Be sure to complete your treatment so that the infection goes away. Follow-up care is a key part of your treatment and safety. Be sure to make and go to all appointments, and call your doctor if you are having problems. It's also a good idea to know your test results and keep a list of the medicines you take. How can you care for yourself at home? · Take your antibiotics as directed. Do not stop taking them just because you feel better. You need to take the full course of antibiotics. · Drink extra water and other fluids for the next day or two. This may help wash out the bacteria that are causing the infection. (If you have kidney, heart, or liver disease and have to limit fluids, talk with your doctor before you increase your fluid intake.)  · Avoid drinks that are carbonated or have caffeine. They can irritate the bladder. · Urinate often.  Try to empty your bladder each time. · To relieve pain, take a hot bath or lay a heating pad set on low over your lower belly or genital area. Never go to sleep with a heating pad in place. To prevent UTIs  · Drink plenty of water each day. This helps you urinate often, which clears bacteria from your system. (If you have kidney, heart, or liver disease and have to limit fluids, talk with your doctor before you increase your fluid intake.)  · Urinate when you need to. · Urinate right after you have sex. · Change sanitary pads often. · Avoid douches, bubble baths, feminine hygiene sprays, and other feminine hygiene products that have deodorants. · After going to the bathroom, wipe from front to back. When should you call for help? Call your doctor now or seek immediate medical care if:  · Symptoms such as fever, chills, nausea, or vomiting get worse or appear for the first time. · You have new pain in your back just below your rib cage. This is called flank pain. · There is new blood or pus in your urine. · You have any problems with your antibiotic medicine. Watch closely for changes in your health, and be sure to contact your doctor if:  · You are not getting better after taking an antibiotic for 2 days. · Your symptoms go away but then come back. Where can you learn more? Go to http://paul-juan manuel.info/. Enter F577 in the search box to learn more about \"Urinary Tract Infection in Women: Care Instructions. \"  Current as of: November 28, 2016  Content Version: 11.3  © 9814-0063 [employer]. Care instructions adapted under license by [employer] (which disclaims liability or warranty for this information). If you have questions about a medical condition or this instruction, always ask your healthcare professional. [address] any warranty or liability for your use of this information.

## 2017-10-16 LAB
C TRACH RRNA SPEC QL NAA+PROBE: NEGATIVE
N GONORRHOEA RRNA SPEC QL NAA+PROBE: NEGATIVE
SPECIMEN SOURCE: NORMAL

## 2018-05-16 ENCOUNTER — HOSPITAL ENCOUNTER (OUTPATIENT)
Dept: LAB | Age: 58
Discharge: HOME | End: 2018-05-16
Payer: COMMERCIAL

## 2018-05-16 PROCEDURE — 80183 DRUG SCRN QUANT OXCARBAZEPIN: CPT | Performed by: PSYCHIATRY & NEUROLOGY

## 2018-05-16 PROCEDURE — 36415 COLL VENOUS BLD VENIPUNCTURE: CPT | Performed by: PSYCHIATRY & NEUROLOGY

## 2018-05-16 PROCEDURE — 80177 DRUG SCRN QUAN LEVETIRACETAM: CPT | Performed by: PSYCHIATRY & NEUROLOGY

## 2018-05-18 LAB
LEVETIRACETAM SERPL-MCNC: 17.2 UG/ML (ref 10–40)
OXCARBAZEPINE SERPL-MCNC: 28 UG/ML (ref 10–35)

## 2018-05-21 LAB
FAX TO INFO,FAXT: NORMAL
FAX TO NUMBER,FAXN: NORMAL

## 2019-01-09 ENCOUNTER — APPOINTMENT (OUTPATIENT)
Dept: GENERAL RADIOLOGY | Age: 59
End: 2019-01-09
Attending: PHYSICIAN ASSISTANT
Payer: COMMERCIAL

## 2019-01-09 ENCOUNTER — HOSPITAL ENCOUNTER (EMERGENCY)
Age: 59
Discharge: HOME | End: 2019-01-09
Attending: EMERGENCY MEDICINE
Payer: COMMERCIAL

## 2019-01-09 VITALS
DIASTOLIC BLOOD PRESSURE: 73 MMHG | WEIGHT: 165 LBS | TEMPERATURE: 79 F | SYSTOLIC BLOOD PRESSURE: 143 MMHG | RESPIRATION RATE: 16 BRPM | BODY MASS INDEX: 30.36 KG/M2 | HEIGHT: 62 IN | OXYGEN SATURATION: 100 % | HEART RATE: 79 BPM

## 2019-01-09 DIAGNOSIS — J20.9 ACUTE BRONCHITIS WITH BRONCHOSPASM: ICD-10-CM

## 2019-01-09 DIAGNOSIS — J45.901 MILD ASTHMA WITH ACUTE EXACERBATION, UNSPECIFIED WHETHER PERSISTENT: Primary | ICD-10-CM

## 2019-01-09 DIAGNOSIS — R10.84 ABDOMINAL PAIN, GENERALIZED: ICD-10-CM

## 2019-01-09 LAB
ALBUMIN SERPL-MCNC: 4.1 G/DL (ref 3.4–5)
ALBUMIN/GLOB SERPL: 1.4 {RATIO} (ref 0.8–1.7)
ALP SERPL-CCNC: 69 U/L (ref 45–117)
ALT SERPL-CCNC: 16 U/L (ref 13–56)
AMPHET UR QL SCN: NEGATIVE
ANION GAP SERPL CALC-SCNC: 8 MMOL/L (ref 3–18)
APPEARANCE UR: ABNORMAL
AST SERPL-CCNC: 14 U/L (ref 15–37)
BARBITURATES UR QL SCN: NEGATIVE
BASOPHILS # BLD: 0 K/UL (ref 0–0.1)
BASOPHILS NFR BLD: 0 % (ref 0–2)
BENZODIAZ UR QL: NEGATIVE
BILIRUB SERPL-MCNC: 0.3 MG/DL (ref 0.2–1)
BILIRUB UR QL: NEGATIVE
BUN SERPL-MCNC: 14 MG/DL (ref 7–18)
BUN/CREAT SERPL: 16
CALCIUM SERPL-MCNC: 9 MG/DL (ref 8.5–10.1)
CANNABINOIDS UR QL SCN: NEGATIVE
CHLORIDE SERPL-SCNC: 103 MMOL/L (ref 100–108)
CK MB CFR SERPL CALC: NORMAL % (ref 0–4)
CK MB SERPL-MCNC: <1 NG/ML (ref 5–25)
CK SERPL-CCNC: 74 U/L (ref 26–192)
CO2 SERPL-SCNC: 29 MMOL/L (ref 21–32)
COCAINE UR QL SCN: NEGATIVE
COLOR UR: YELLOW
CREAT SERPL-MCNC: 0.86 MG/DL (ref 0.6–1.3)
DIFFERENTIAL METHOD BLD: ABNORMAL
EOSINOPHIL # BLD: 0.3 K/UL (ref 0–0.4)
EOSINOPHIL NFR BLD: 6 % (ref 0–5)
ERYTHROCYTE [DISTWIDTH] IN BLOOD BY AUTOMATED COUNT: 12.6 % (ref 11.6–14.5)
FLUAV AG NPH QL IA: NEGATIVE
FLUBV AG NOSE QL IA: NEGATIVE
GLOBULIN SER CALC-MCNC: 3 G/DL (ref 2–4)
GLUCOSE SERPL-MCNC: 90 MG/DL (ref 74–99)
GLUCOSE UR STRIP.AUTO-MCNC: NEGATIVE MG/DL
HCT VFR BLD AUTO: 39.1 % (ref 35–45)
HDSCOM,HDSCOM: NORMAL
HGB BLD-MCNC: 12.9 G/DL (ref 12–16)
HGB UR QL STRIP: NEGATIVE
KETONES UR QL STRIP.AUTO: 15 MG/DL
LEUKOCYTE ESTERASE UR QL STRIP.AUTO: NEGATIVE
LYMPHOCYTES # BLD: 1.7 K/UL (ref 0.9–3.6)
LYMPHOCYTES NFR BLD: 32 % (ref 21–52)
MAGNESIUM SERPL-MCNC: 2.2 MG/DL (ref 1.6–2.6)
MCH RBC QN AUTO: 31.2 PG (ref 24–34)
MCHC RBC AUTO-ENTMCNC: 33 G/DL (ref 31–37)
MCV RBC AUTO: 94.4 FL (ref 74–97)
METHADONE UR QL: NEGATIVE
MONOCYTES # BLD: 0.3 K/UL (ref 0.05–1.2)
MONOCYTES NFR BLD: 6 % (ref 3–10)
NEUTS SEG # BLD: 2.9 K/UL (ref 1.8–8)
NEUTS SEG NFR BLD: 56 % (ref 40–73)
NITRITE UR QL STRIP.AUTO: NEGATIVE
OPIATES UR QL: NEGATIVE
PCP UR QL: NEGATIVE
PH UR STRIP: 6.5 [PH] (ref 5–8)
PLATELET # BLD AUTO: 386 K/UL (ref 135–420)
PMV BLD AUTO: 10.5 FL (ref 9.2–11.8)
POTASSIUM SERPL-SCNC: 3.6 MMOL/L (ref 3.5–5.5)
PROT SERPL-MCNC: 7.1 G/DL (ref 6.4–8.2)
PROT UR STRIP-MCNC: NEGATIVE MG/DL
RBC # BLD AUTO: 4.14 M/UL (ref 4.2–5.3)
SODIUM SERPL-SCNC: 140 MMOL/L (ref 136–145)
SP GR UR REFRACTOMETRY: 1.03 (ref 1–1.03)
TROPONIN I SERPL-MCNC: <0.02 NG/ML (ref 0–0.04)
UROBILINOGEN UR QL STRIP.AUTO: 0.2 EU/DL (ref 0.2–1)
WBC # BLD AUTO: 5.2 K/UL (ref 4.6–13.2)

## 2019-01-09 PROCEDURE — 74011000250 HC RX REV CODE- 250: Performed by: PHYSICIAN ASSISTANT

## 2019-01-09 PROCEDURE — 80307 DRUG TEST PRSMV CHEM ANLYZR: CPT

## 2019-01-09 PROCEDURE — 99284 EMERGENCY DEPT VISIT MOD MDM: CPT

## 2019-01-09 PROCEDURE — 94640 AIRWAY INHALATION TREATMENT: CPT

## 2019-01-09 PROCEDURE — 85025 COMPLETE CBC W/AUTO DIFF WBC: CPT

## 2019-01-09 PROCEDURE — 96375 TX/PRO/DX INJ NEW DRUG ADDON: CPT

## 2019-01-09 PROCEDURE — 96365 THER/PROPH/DIAG IV INF INIT: CPT

## 2019-01-09 PROCEDURE — 82550 ASSAY OF CK (CPK): CPT

## 2019-01-09 PROCEDURE — 71045 X-RAY EXAM CHEST 1 VIEW: CPT

## 2019-01-09 PROCEDURE — 81003 URINALYSIS AUTO W/O SCOPE: CPT

## 2019-01-09 PROCEDURE — 87804 INFLUENZA ASSAY W/OPTIC: CPT

## 2019-01-09 PROCEDURE — 83735 ASSAY OF MAGNESIUM: CPT

## 2019-01-09 PROCEDURE — 80053 COMPREHEN METABOLIC PANEL: CPT

## 2019-01-09 PROCEDURE — 74011250636 HC RX REV CODE- 250/636: Performed by: PHYSICIAN ASSISTANT

## 2019-01-09 PROCEDURE — 77030013140 HC MSK NEB VYRM -A

## 2019-01-09 RX ORDER — ALBUTEROL SULFATE 1.25 MG/3ML
1.25 SOLUTION RESPIRATORY (INHALATION)
Qty: 25 EACH | Refills: 0 | Status: SHIPPED | OUTPATIENT
Start: 2019-01-09 | End: 2021-10-20

## 2019-01-09 RX ORDER — PREDNISONE 10 MG/1
TABLET ORAL
Qty: 21 TAB | Refills: 0 | Status: SHIPPED | OUTPATIENT
Start: 2019-01-09 | End: 2020-02-18

## 2019-01-09 RX ORDER — DILTIAZEM HYDROCHLORIDE 180 MG/1
CAPSULE, EXTENDED RELEASE ORAL DAILY
COMMUNITY
End: 2021-10-20

## 2019-01-09 RX ORDER — ALBUTEROL SULFATE 90 UG/1
2 AEROSOL, METERED RESPIRATORY (INHALATION)
Qty: 1 INHALER | Refills: 1 | Status: SHIPPED | OUTPATIENT
Start: 2019-01-09

## 2019-01-09 RX ORDER — AZITHROMYCIN 250 MG/1
TABLET, FILM COATED ORAL
Qty: 6 TAB | Refills: 0 | Status: SHIPPED | OUTPATIENT
Start: 2019-01-09 | End: 2019-01-14

## 2019-01-09 RX ORDER — MAGNESIUM SULFATE HEPTAHYDRATE 40 MG/ML
2 INJECTION, SOLUTION INTRAVENOUS ONCE
Status: COMPLETED | OUTPATIENT
Start: 2019-01-09 | End: 2019-01-09

## 2019-01-09 RX ORDER — IPRATROPIUM BROMIDE AND ALBUTEROL SULFATE 2.5; .5 MG/3ML; MG/3ML
3 SOLUTION RESPIRATORY (INHALATION)
Status: COMPLETED | OUTPATIENT
Start: 2019-01-09 | End: 2019-01-09

## 2019-01-09 RX ORDER — DICYCLOMINE HYDROCHLORIDE 20 MG/1
20 TABLET ORAL EVERY 6 HOURS
Qty: 20 TAB | Refills: 0 | Status: SHIPPED | OUTPATIENT
Start: 2019-01-09 | End: 2019-01-14

## 2019-01-09 RX ORDER — ONDANSETRON 2 MG/ML
4 INJECTION INTRAMUSCULAR; INTRAVENOUS
Status: COMPLETED | OUTPATIENT
Start: 2019-01-09 | End: 2019-01-09

## 2019-01-09 RX ORDER — ONDANSETRON 4 MG/1
4 TABLET, ORALLY DISINTEGRATING ORAL
Qty: 12 TAB | Refills: 0 | Status: SHIPPED | OUTPATIENT
Start: 2019-01-09 | End: 2021-08-09

## 2019-01-09 RX ORDER — CODEINE PHOSPHATE AND GUAIFENESIN 10; 100 MG/5ML; MG/5ML
5 SOLUTION ORAL
Qty: 160 ML | Refills: 0 | Status: SHIPPED | OUTPATIENT
Start: 2019-01-09 | End: 2020-02-18

## 2019-01-09 RX ORDER — FENTANYL CITRATE 50 UG/ML
50 INJECTION, SOLUTION INTRAMUSCULAR; INTRAVENOUS
Status: COMPLETED | OUTPATIENT
Start: 2019-01-09 | End: 2019-01-09

## 2019-01-09 RX ADMIN — IPRATROPIUM BROMIDE AND ALBUTEROL SULFATE 3 ML: .5; 3 SOLUTION RESPIRATORY (INHALATION) at 16:31

## 2019-01-09 RX ADMIN — SODIUM CHLORIDE 1000 ML: 900 INJECTION, SOLUTION INTRAVENOUS at 16:29

## 2019-01-09 RX ADMIN — FENTANYL CITRATE 50 MCG: 50 INJECTION, SOLUTION INTRAMUSCULAR; INTRAVENOUS at 16:24

## 2019-01-09 RX ADMIN — METHYLPREDNISOLONE SODIUM SUCCINATE 125 MG: 125 INJECTION, POWDER, FOR SOLUTION INTRAMUSCULAR; INTRAVENOUS at 16:26

## 2019-01-09 RX ADMIN — ONDANSETRON 4 MG: 2 INJECTION INTRAMUSCULAR; INTRAVENOUS at 16:26

## 2019-01-09 RX ADMIN — MAGNESIUM SULFATE HEPTAHYDRATE 2 G: 40 INJECTION, SOLUTION INTRAVENOUS at 16:30

## 2019-01-09 NOTE — ED TRIAGE NOTES
Patient was sent here by PCP for flu like symptoms. Patient reports fever and chills for three weeks.

## 2019-01-09 NOTE — ED NOTES
Patient appears to be in mild distress, audible wheezing heard, coughing and anxious at this time. Patient has her  at bedside.

## 2019-01-09 NOTE — ED NOTES
Care assumed for discharge only. Patient armband removed and shredded. Pt given script x1 with discharge instructions and verbalizes understanding. Pt informed to  scripts at her pharmacy. Pt discharged home ambulatory with spouse who states he will drive pt home. Pt states she is feeling better.

## 2019-01-09 NOTE — ED PROVIDER NOTES
EMERGENCY DEPARTMENT HISTORY AND PHYSICAL EXAM 
 
Date: 1/9/2019 Patient Name: Cadence Gandhi History of Presenting Illness Chief Complaint Patient presents with  Fever  Chills History Provided By: Patient Chief Complaint: cough Duration: 3 Weeks Timing:  persistent Severity: Moderate Associated Symptoms:  fever, chills, congestion,  generalized body aches, headache, and numbness in the hands/feet. Additional History (Context):  
3:47 PM 
Cadence Gandhi is a 63 y.o. female PMHx of asthma, migraines, seizure,  HLD, and HTN who presents to the emergency department C/O cough productive green phlegm onset 3 weeks ago. Associated symptoms include fever, chills, congestion,  generalized body aches, headache, and numbness in the hands/feet. Symptoms similar to previous PNA. History of asthma, rx nebulizer and albuterol. Asthma flare ups contributed to illness. History of migraines. Current headache similar to previous migraines. Followed by Lan Eubanks MD, neurology. Patient did not receive flu vaccination this year. Denies tobacco usage. Occasional EtOH usage. C/O mid abdominal pain onset 3 days ago. Associated symptoms include back pain. Pt denies any other Sx or complaints. PCP: Other, MD Kirk 
 
Current Outpatient Medications Medication Sig Dispense Refill  CASCARA SAGRADA PO Take  by mouth.  dilTIAZem XR (DILACOR XR) 180 mg XR capsule Take  by mouth daily.  albuterol (PROVENTIL HFA, VENTOLIN HFA, PROAIR HFA) 90 mcg/actuation inhaler Take 2 Puffs by inhalation every four (4) hours as needed for Wheezing or Shortness of Breath. 1 Inhaler 1  
 inhalational spacing device 1 Each by Does Not Apply route as needed. Please dispense one adult spacer to be used with albuterol HFA. 1 Device 0  predniSONE (STERAPRED DS) 10 mg dose pack Take with food.  21 Tab 0  
 albuterol (ACCUNEB) 1.25 mg/3 mL nebu Take 3 mL by inhalation every four (4) hours as needed (wheezing). 25 Each 0  
 azithromycin (ZITHROMAX Z-ALESSANDRA) 250 mg tablet Take 2 pills on day 1 and 1 pill on days 2-5 6 Tab 0  
 dicyclomine (BENTYL) 20 mg tablet Take 1 Tab by mouth every six (6) hours for 20 doses. As needed for abdominal cramping 20 Tab 0  
 ondansetron (ZOFRAN ODT) 4 mg disintegrating tablet Take 1 Tab by mouth every eight (8) hours as needed for Nausea. 12 Tab 0  
 guaiFENesin-codeine (ROBITUSSIN AC) 100-10 mg/5 mL solution Take 5 mL by mouth three (3) times daily as needed for Cough. Max Daily Amount: 15 mL. 160 mL 0  
 clonazePAM (KLONOPIN) 0.5 mg tablet Take 1 Tab by mouth three (3) times daily. Max Daily Amount: 1.5 mg. 20 Tab 0  
 dronabinol (MARINOL) 2.5 mg capsule Take 1 Cap by mouth Before breakfast and dinner. Max Daily Amount: 5 mg. 20 Cap 0  
 montelukast (SINGULAIR) 10 mg tablet Take 10 mg by mouth daily.  naproxen (NAPROSYN) 500 mg tablet Take 500 mg by mouth two (2) times daily as needed.  raNITIdine (ZANTAC) 150 mg tablet Take 150 mg by mouth two (2) times a day.  conjugated estrogens (PREMARIN) 0.9 mg tablet Take 0.9 mg by mouth daily.  acetaminophen (TYLENOL) 500 mg tablet Take 1,000 mg by mouth every six (6) hours as needed for Pain or Fever.  fluticasone-salmeterol (ADVAIR) 250-50 mcg/dose diskus inhaler Take 1 Puff by inhalation two (2) times a day. 1 Inhaler 1  
 escitalopram oxalate (LEXAPRO) 10 mg tablet Take 15 mg by mouth daily.  aspirin delayed-release 81 mg tablet Take 81 mg by mouth daily.  levETIRAcetam (KEPPRA) 500 mg tablet Take 500 mg by mouth two (2) times a day.  SUMAtriptan succinate 6 mg/0.5 mL kit 6 mg by SubCUTAneous route once as needed for Migraine.  losartan-hydrochlorothiazide (HYZAAR) 100-25 mg per tablet Take 1 Tab by mouth daily.  OXcarbazepine (TRILEPTAL) 300 mg tablet Take 300 mg by mouth two (2) times a day. Past History Past Medical History: Past Medical History:  
Diagnosis Date  Arthritis  Asthma  Chronic kidney disease   
 deformed right  kidney  Diabetes ([facility] [address]  Hypertension  Migraines  PUD (peptic ulcer disease) H/O, no meds at this time 2016  Seizures ([facility] [address]  Unspecified sleep apnea   
 uses cpap Past Surgical History: 
Past Surgical History:  
Procedure Laterality Date  HX APPENDECTOMY  HX GYN    
 hysterectomy Family History: 
Family History Problem Relation Age of Onset  Malignant Hyperthermia Neg Hx  Pseudocholinesterase Deficiency Neg Hx  Delayed Awakening Neg Hx  Post-op Nausea/Vomiting Neg Hx  Emergence Delirium Neg Hx  Post-op Cognitive Dysfunction Neg Hx   
 Other Neg Hx Social History: 
Social History Tobacco Use  Smoking status: Never Smoker  Smokeless tobacco: Never Used Substance Use Topics  Alcohol use: No  
 Drug use: No  
 
 
Allergies: Allergies Allergen Reactions  Morphine Itching Can tolerate if premed w/ benadryl  Pertussis Vaccine,Adsorbed Swelling Swelling at injection site  Vicodin [Hydrocodone-Acetaminophen] Itching Can tolerate if premed w/ benadryl Review of Systems Review of Systems Constitutional: Positive for chills and fever. HENT: Positive for congestion and postnasal drip. Negative for sore throat. Respiratory: Positive for cough and wheezing. Negative for shortness of breath. Cardiovascular: Negative for chest pain. Gastrointestinal: Positive for abdominal pain. Negative for nausea and vomiting. Genitourinary: Negative for dysuria and frequency. Musculoskeletal: Positive for back pain and myalgias. Negative for arthralgias and joint swelling. Skin: Negative for rash. Neurological: Positive for numbness and headaches. Negative for dizziness, weakness and light-headedness. Hematological: Negative for adenopathy. All other systems reviewed and are negative. Physical Exam  
 
Vitals:  
 01/09/19 1640 01/09/19 1700 01/09/19 1720 01/09/19 1741 BP:    143/73 Pulse:    79 Resp:    16 Temp:    (!) 79 °F (26.1 °C) SpO2: 100% 100% 99% 100% Weight:      
Height:      
 
Physical Exam  
Constitutional: She is oriented to person, place, and time. She appears well-developed and well-nourished. No distress. AA female in NAD. Alert. No resp distress. Active bronchospasm HENT:  
Head: Normocephalic and atraumatic. Right Ear: External ear normal. No swelling or tenderness. Tympanic membrane is not perforated, not erythematous and not bulging. Left Ear: External ear normal. No swelling or tenderness. Tympanic membrane is not perforated, not erythematous and not bulging. Nose: Mucosal edema present. No rhinorrhea. Right sinus exhibits no maxillary sinus tenderness and no frontal sinus tenderness. Left sinus exhibits no maxillary sinus tenderness and no frontal sinus tenderness. Mouth/Throat: Uvula is midline, oropharynx is clear and moist and mucous membranes are normal. No oral lesions. No trismus in the jaw. No dental abscesses or uvula swelling. No oropharyngeal exudate, posterior oropharyngeal edema, posterior oropharyngeal erythema or tonsillar abscesses. Eyes: Conjunctivae are normal.  
Neck: Normal range of motion. Cardiovascular: Normal rate, regular rhythm, normal heart sounds and intact distal pulses. Exam reveals no gallop and no friction rub. No murmur heard. Pulmonary/Chest: Effort normal. No accessory muscle usage. No tachypnea. No respiratory distress. She has no decreased breath sounds. She has wheezes in the right lower field and the left lower field. She has no rhonchi. She has no rales. Abdominal: Soft. Normal appearance. She exhibits no distension and no mass. There is no tenderness. There is no rigidity, no rebound, no guarding and no tenderness at McBurney's point. Musculoskeletal: Normal range of motion. Neurological: She is alert and oriented to person, place, and time. Skin: Skin is warm and dry. No rash noted. She is not diaphoretic. No erythema. Psychiatric: Judgment normal. Her mood appears anxious. Nursing note and vitals reviewed. Diagnostic Study Results Labs - Recent Results (from the past 12 hour(s)) DRUG SCREEN, URINE Collection Time: 01/09/19  3:10 PM  
Result Value Ref Range BENZODIAZEPINES NEGATIVE  NEG    
 BARBITURATES NEGATIVE  NEG    
 THC (TH-CANNABINOL) NEGATIVE  NEG    
 OPIATES NEGATIVE  NEG    
 PCP(PHENCYCLIDINE) NEGATIVE  NEG    
 COCAINE NEGATIVE  NEG    
 AMPHETAMINES NEGATIVE  NEG METHADONE NEGATIVE  NEG HDSCOM (NOTE) URINALYSIS W/ RFLX MICROSCOPIC Collection Time: 01/09/19  3:10 PM  
Result Value Ref Range Color YELLOW Appearance CLOUDY Specific gravity 1.026 1.005 - 1.030    
 pH (UA) 6.5 5.0 - 8.0 Protein NEGATIVE  NEG mg/dL Glucose NEGATIVE  NEG mg/dL Ketone 15 (A) NEG mg/dL Bilirubin NEGATIVE  NEG Blood NEGATIVE  NEG Urobilinogen 0.2 0.2 - 1.0 EU/dL Nitrites NEGATIVE  NEG Leukocyte Esterase NEGATIVE  NEG    
INFLUENZA A & B AG (RAPID TEST) Collection Time: 01/09/19  3:20 PM  
Result Value Ref Range Influenza A Antigen NEGATIVE  NEG Influenza B Antigen NEGATIVE  NEG    
CBC WITH AUTOMATED DIFF Collection Time: 01/09/19  3:30 PM  
Result Value Ref Range WBC 5.2 4.6 - 13.2 K/uL  
 RBC 4.14 (L) 4.20 - 5.30 M/uL  
 HGB 12.9 12.0 - 16.0 g/dL HCT 39.1 35.0 - 45.0 % MCV 94.4 74.0 - 97.0 FL  
 MCH 31.2 24.0 - 34.0 PG  
 MCHC 33.0 31.0 - 37.0 g/dL  
 RDW 12.6 11.6 - 14.5 % PLATELET 318 531 - 684 K/uL MPV 10.5 9.2 - 11.8 FL  
 NEUTROPHILS 56 40 - 73 % LYMPHOCYTES 32 21 - 52 % MONOCYTES 6 3 - 10 % EOSINOPHILS 6 (H) 0 - 5 % BASOPHILS 0 0 - 2 %  
 ABS. NEUTROPHILS 2.9 1.8 - 8.0 K/UL  
 ABS. LYMPHOCYTES 1.7 0.9 - 3.6 K/UL  
 ABS. MONOCYTES 0.3 0.05 - 1.2 K/UL ABS. EOSINOPHILS 0.3 0.0 - 0.4 K/UL  
 ABS. BASOPHILS 0.0 0.0 - 0.1 K/UL  
 DF AUTOMATED METABOLIC PANEL, COMPREHENSIVE Collection Time: 01/09/19  3:30 PM  
Result Value Ref Range Sodium 140 136 - 145 mmol/L Potassium 3.6 3.5 - 5.5 mmol/L Chloride 103 100 - 108 mmol/L  
 CO2 29 21 - 32 mmol/L Anion gap 8 3.0 - 18 mmol/L Glucose 90 74 - 99 mg/dL BUN 14 7.0 - 18 MG/DL Creatinine 0.86 0.6 - 1.3 MG/DL  
 BUN/Creatinine ratio 16 GFR est AA >60 >60 ml/min/1.73m2 GFR est non-AA >60 >60 ml/min/1.73m2 Calcium 9.0 8.5 - 10.1 MG/DL Bilirubin, total 0.3 0.2 - 1.0 MG/DL  
 ALT (SGPT) 16 13 - 56 U/L  
 AST (SGOT) 14 (L) 15 - 37 U/L Alk. phosphatase 69 45 - 117 U/L Protein, total 7.1 6.4 - 8.2 g/dL Albumin 4.1 3.4 - 5.0 g/dL Globulin 3.0 2.0 - 4.0 g/dL A-G Ratio 1.4 0.8 - 1.7 CARDIAC PANEL,(CK, CKMB & TROPONIN) Collection Time: 01/09/19  3:30 PM  
Result Value Ref Range CK 74 26 - 192 U/L  
 CK - MB <1.0 <3.6 ng/ml CK-MB Index  0.0 - 4.0 % CALCULATION NOT PERFORMED WHEN RESULT IS BELOW LINEAR LIMIT Troponin-I, QT <0.02 0.0 - 0.045 NG/ML  
MAGNESIUM Collection Time: 01/09/19  3:30 PM  
Result Value Ref Range Magnesium 2.2 1.6 - 2.6 mg/dL Radiologic Studies -  
XR CHEST PORT Final Result Impression: No acute cardiopulmonary disease. CT Results  (Last 48 hours) None CXR Results  (Last 48 hours) 01/09/19 1608  XR CHEST PORT Final result Impression:  Impression: No acute cardiopulmonary disease. Narrative:  CHEST AP PORTABLE Indication: Cough, shortness of breath, fever and chills. Comparison: 12/23/2016. Findings: The lungs appear clear. The cardiac silhouette and pulmonary  
vascularity appear within normal limits. No evidence for pneumothorax or pleural  
effusion. Medications given in the ED- Medications methylPREDNISolone (PF) (Solu-MEDROL) injection 125 mg (125 mg IntraVENous Given 1/9/19 1626)  
magnesium sulfate 2 g/50 ml IVPB (premix or compounded) (0 g IntraVENous IV Completed 1/9/19 1742)  
albuterol-ipratropium (DUO-NEB) 2.5 MG-0.5 MG/3 ML (3 mL Nebulization Given 1/9/19 1631)  
sodium chloride 0.9 % bolus infusion 1,000 mL (0 mL IntraVENous IV Completed 1/9/19 1742) fentaNYL citrate (PF) injection 50 mcg (50 mcg IntraVENous Given 1/9/19 1624) ondansetron (ZOFRAN) injection 4 mg (4 mg IntraVENous Given 1/9/19 1626) Medical Decision Making I am the first provider for this patient. I reviewed the vital signs, available nursing notes, past medical history, past surgical history, family history and social history. Vital Signs-Reviewed the patient's vital signs. Pulse Oximetry Analysis - 100% on RA Records Reviewed: Nursing Notes and Old Medical Records Provider Notes (Medical Decision Making): URI, strep, sinusitis, influenza, asthma, PNA, allergic, gastroenteritis, gastritis, PUD, GERD. Doubt appy or diverticulitis. Procedures: 
Procedures ED Course:  
3:47 PM Initial assessment performed. The patients presenting problems have been discussed, and they are in agreement with the care plan formulated and outlined with them. I have encouraged them to ask questions as they arise throughout their visit. 4:43 PM Pt feeling better. Wheezing has resolved. Work-up rather unremarkable. Will tx for bronchitis with asthma flare. Benign abdomen with reassuring labs. Reasons to RTED discussed with pt. All questions answered. Pt feels comfortable going home at this time. Pt expressed understanding and she agrees with plan. Diagnosis and Disposition DISCHARGE NOTE: 
5:00 PM 
Toan Diane's  results have been reviewed with her. She has been counseled regarding her diagnosis, treatment, and plan.   She verbally conveys understanding and agreement of the signs, symptoms, diagnosis, treatment and prognosis and additionally agrees to follow up as discussed. She also agrees with the care-plan and conveys that all of her questions have been answered. I have also provided discharge instructions for her that include: educational information regarding their diagnosis and treatment, and list of reasons why they would want to return to the ED prior to their follow-up appointment, should her condition change. She has been provided with education for proper emergency department utilization. CLINICAL IMPRESSION: 
 
1. Mild asthma with acute exacerbation, unspecified whether persistent 2. Acute bronchitis with bronchospasm 3. Abdominal pain, generalized PLAN: 
1. D/C Home 2. Discharge Medication List as of 1/9/2019  4:51 PM  
  
START taking these medications Details  
inhalational spacing device 1 Each by Does Not Apply route as needed. Please dispense one adult spacer to be used with albuterol HFA., Normal, Disp-1 Device, R-0  
  
predniSONE (STERAPRED DS) 10 mg dose pack Take with food., Normal, Disp-21 Tab, R-0  
  
albuterol (ACCUNEB) 1.25 mg/3 mL nebu Take 3 mL by inhalation every four (4) hours as needed (wheezing). , Normal, Disp-25 Each, R-0  
  
azithromycin (ZITHROMAX Z-ALESSANDRA) 250 mg tablet Take 2 pills on day 1 and 1 pill on days 2-5, Normal, Disp-6 Tab, R-0  
  
dicyclomine (BENTYL) 20 mg tablet Take 1 Tab by mouth every six (6) hours for 20 doses. As needed for abdominal cramping, Normal, Disp-20 Tab, R-0  
  
guaiFENesin-codeine (ROBITUSSIN AC) 100-10 mg/5 mL solution Take 5 mL by mouth three (3) times daily as needed for Cough. Max Daily Amount: 15 mL. , Print, Disp-160 mL, R-0  
  
  
CONTINUE these medications which have CHANGED  Details  
albuterol (PROVENTIL HFA, VENTOLIN HFA, PROAIR HFA) 90 mcg/actuation inhaler Take 2 Puffs by inhalation every four (4) hours as needed for Wheezing or Shortness of Breath., Normal, Disp-1 Inhaler, R-1  
  
ondansetron (ZOFRAN ODT) 4 mg disintegrating tablet Take 1 Tab by mouth every eight (8) hours as needed for Nausea., Normal, Disp-12 Tab, R-0  
  
  
CONTINUE these medications which have NOT CHANGED Details CASCARA SAGRADA PO Take  by mouth., Historical Med  
  
dilTIAZem XR (DILACOR XR) 180 mg XR capsule Take  by mouth daily. , Historical Med  
  
clonazePAM (KLONOPIN) 0.5 mg tablet Take 1 Tab by mouth three (3) times daily. Max Daily Amount: 1.5 mg., Print, Disp-20 Tab, R-0  
  
dronabinol (MARINOL) 2.5 mg capsule Take 1 Cap by mouth Before breakfast and dinner. Max Daily Amount: 5 mg., Print, Disp-20 Cap, R-0  
  
montelukast (SINGULAIR) 10 mg tablet Take 10 mg by mouth daily. , Historical Med  
  
naproxen (NAPROSYN) 500 mg tablet Take 500 mg by mouth two (2) times daily as needed., Historical MedNot taking currently  
  
raNITIdine (ZANTAC) 150 mg tablet Take 150 mg by mouth two (2) times a day., Historical Med  
  
conjugated estrogens (PREMARIN) 0.9 mg tablet Take 0.9 mg by mouth daily. , Historical Med  
  
acetaminophen (TYLENOL) 500 mg tablet Take 1,000 mg by mouth every six (6) hours as needed for Pain or Fever., Historical Med  
  
fluticasone-salmeterol (ADVAIR) 250-50 mcg/dose diskus inhaler Take 1 Puff by inhalation two (2) times a day., Print, Disp-1 Inhaler, R-1  
  
escitalopram oxalate (LEXAPRO) 10 mg tablet Take 15 mg by mouth daily. , Historical Med  
  
aspirin delayed-release 81 mg tablet Take 81 mg by mouth daily. , Historical Med  
  
levETIRAcetam (KEPPRA) 500 mg tablet Take 500 mg by mouth two (2) times a day., Historical Med  
  
SUMAtriptan succinate 6 mg/0.5 mL kit 6 mg by SubCUTAneous route once as needed for Migraine., Historical Med  
  
losartan-hydrochlorothiazide (HYZAAR) 100-25 mg per tablet Take 1 Tab by mouth daily. , Historical Med OXcarbazepine (TRILEPTAL) 300 mg tablet Take 300 mg by mouth two (2) times a day., Historical Med  
  
  
STOP taking these medications  
  
 albuterol (PROVENTIL VENTOLIN) 2.5 mg /3 mL (0.083 %) nebulizer solution Comments:  
Reason for Stopping:   
   
 albuterol (PROVENTIL, VENTOLIN) 90 mcg/actuation inhaler Comments:  
Reason for Stopping: 3.  
Follow-up Information Follow up With Specialties Details Why Contact Info Ana Laura Guevara MD [address] [facility] [address] 
[phone] [facility] EMERGENCY DEPT Emergency Medicine  As needed, If symptoms worsen [address] Bernal 
[address] [address] 
[phone]  
  
 
_______________________________ Attestations: This note is prepared by Annetta Mantilla and [facility], acting as Scribe for Alexi Severino PA-C. Alexi Severino PA-C:  The scribe's documentation has been prepared under my direction and personally reviewed by me in its entirety. I confirm that the note above accurately reflects all work, treatment, procedures, and medical decision making performed by me. 
_______________________________

## 2019-01-09 NOTE — ED NOTES
Patient is resting in low stretcher, side rails up X2 sides. Patient is pending completion of medications and discharge.

## 2019-10-14 ENCOUNTER — HOSPITAL ENCOUNTER (OUTPATIENT)
Dept: LAB | Age: 59
Discharge: HOME | End: 2019-10-14
Payer: COMMERCIAL

## 2019-10-14 LAB
ALBUMIN SERPL-MCNC: 3.8 G/DL (ref 3.4–5)
ALBUMIN/GLOB SERPL: 1.3 {RATIO} (ref 0.8–1.7)
ALP SERPL-CCNC: 68 U/L (ref 45–117)
ALT SERPL-CCNC: 18 U/L (ref 13–56)
ANION GAP SERPL CALC-SCNC: 7 MMOL/L (ref 3–18)
AST SERPL-CCNC: 12 U/L (ref 10–38)
BASOPHILS # BLD: 0 K/UL (ref 0–0.1)
BASOPHILS NFR BLD: 1 % (ref 0–2)
BILIRUB SERPL-MCNC: 0.3 MG/DL (ref 0.2–1)
BUN SERPL-MCNC: 13 MG/DL (ref 7–18)
BUN/CREAT SERPL: 14 (ref 12–20)
CALCIUM SERPL-MCNC: 8.7 MG/DL (ref 8.5–10.1)
CHLORIDE SERPL-SCNC: 98 MMOL/L (ref 100–111)
CO2 SERPL-SCNC: 33 MMOL/L (ref 21–32)
CREAT SERPL-MCNC: 0.9 MG/DL (ref 0.6–1.3)
DIFFERENTIAL METHOD BLD: ABNORMAL
EOSINOPHIL # BLD: 0.2 K/UL (ref 0–0.4)
EOSINOPHIL NFR BLD: 6 % (ref 0–5)
ERYTHROCYTE [DISTWIDTH] IN BLOOD BY AUTOMATED COUNT: 12.2 % (ref 11.6–14.5)
GLOBULIN SER CALC-MCNC: 3 G/DL (ref 2–4)
GLUCOSE SERPL-MCNC: 95 MG/DL (ref 74–99)
HCT VFR BLD AUTO: 36.8 % (ref 35–45)
HGB BLD-MCNC: 12 G/DL (ref 12–16)
LYMPHOCYTES # BLD: 1.3 K/UL (ref 0.9–3.6)
LYMPHOCYTES NFR BLD: 37 % (ref 21–52)
MCH RBC QN AUTO: 30.2 PG (ref 24–34)
MCHC RBC AUTO-ENTMCNC: 32.6 G/DL (ref 31–37)
MCV RBC AUTO: 92.5 FL (ref 74–97)
MONOCYTES # BLD: 0.2 K/UL (ref 0.05–1.2)
MONOCYTES NFR BLD: 6 % (ref 3–10)
NEUTS SEG # BLD: 1.8 K/UL (ref 1.8–8)
NEUTS SEG NFR BLD: 50 % (ref 40–73)
PLATELET # BLD AUTO: 384 K/UL (ref 135–420)
PMV BLD AUTO: 10 FL (ref 9.2–11.8)
POTASSIUM SERPL-SCNC: 3.5 MMOL/L (ref 3.5–5.5)
PROT SERPL-MCNC: 6.8 G/DL (ref 6.4–8.2)
RBC # BLD AUTO: 3.98 M/UL (ref 4.2–5.3)
SODIUM SERPL-SCNC: 138 MMOL/L (ref 136–145)
WBC # BLD AUTO: 3.5 K/UL (ref 4.6–13.2)

## 2019-10-14 PROCEDURE — 36415 COLL VENOUS BLD VENIPUNCTURE: CPT

## 2019-10-14 PROCEDURE — 80177 DRUG SCRN QUAN LEVETIRACETAM: CPT

## 2019-10-14 PROCEDURE — 85025 COMPLETE CBC W/AUTO DIFF WBC: CPT

## 2019-10-14 PROCEDURE — 80183 DRUG SCRN QUANT OXCARBAZEPIN: CPT

## 2019-10-14 PROCEDURE — 80053 COMPREHEN METABOLIC PANEL: CPT

## 2019-10-16 LAB
LEVETIRACETAM SERPL-MCNC: 20.3 UG/ML (ref 10–40)
OXCARBAZEPINE SERPL-MCNC: 35 UG/ML (ref 10–35)

## 2020-02-14 ENCOUNTER — APPOINTMENT (OUTPATIENT)
Dept: CT IMAGING | Age: 60
DRG: 194 | End: 2020-02-14
Attending: HOSPITALIST
Payer: COMMERCIAL

## 2020-02-14 ENCOUNTER — HOSPITAL ENCOUNTER (INPATIENT)
Age: 60
LOS: 4 days | Discharge: HOME | DRG: 194 | End: 2020-02-18
Attending: EMERGENCY MEDICINE | Admitting: HOSPITALIST
Payer: COMMERCIAL

## 2020-02-14 ENCOUNTER — APPOINTMENT (OUTPATIENT)
Dept: GENERAL RADIOLOGY | Age: 60
DRG: 194 | End: 2020-02-14
Attending: EMERGENCY MEDICINE
Payer: COMMERCIAL

## 2020-02-14 DIAGNOSIS — Z78.9 FAILURE OF OUTPATIENT TREATMENT: ICD-10-CM

## 2020-02-14 DIAGNOSIS — J45.41 MODERATE PERSISTENT ASTHMA WITH ACUTE EXACERBATION: ICD-10-CM

## 2020-02-14 DIAGNOSIS — J18.9 COMMUNITY ACQUIRED PNEUMONIA OF RIGHT LOWER LOBE OF LUNG: Primary | ICD-10-CM

## 2020-02-14 DIAGNOSIS — J20.9 ACUTE BRONCHITIS WITH BRONCHOSPASM: ICD-10-CM

## 2020-02-14 PROBLEM — R56.9 SEIZURES (HCC): Status: ACTIVE | Noted: 2020-02-14

## 2020-02-14 LAB
ALBUMIN SERPL-MCNC: 3.7 G/DL (ref 3.4–5)
ALBUMIN/GLOB SERPL: 1 {RATIO} (ref 0.8–1.7)
ALP SERPL-CCNC: 85 U/L (ref 45–117)
ALT SERPL-CCNC: 18 U/L (ref 13–56)
ANION GAP SERPL CALC-SCNC: 5 MMOL/L (ref 3–18)
AST SERPL-CCNC: 19 U/L (ref 10–38)
BASOPHILS # BLD: 0 K/UL (ref 0–0.1)
BASOPHILS NFR BLD: 0 % (ref 0–2)
BILIRUB SERPL-MCNC: 0.2 MG/DL (ref 0.2–1)
BUN SERPL-MCNC: 18 MG/DL (ref 7–18)
BUN/CREAT SERPL: 15 (ref 12–20)
CALCIUM SERPL-MCNC: 8.7 MG/DL (ref 8.5–10.1)
CHLORIDE SERPL-SCNC: 101 MMOL/L (ref 100–111)
CO2 SERPL-SCNC: 31 MMOL/L (ref 21–32)
CREAT SERPL-MCNC: 1.22 MG/DL (ref 0.6–1.3)
DIFFERENTIAL METHOD BLD: ABNORMAL
EOSINOPHIL # BLD: 0.3 K/UL (ref 0–0.4)
EOSINOPHIL NFR BLD: 8 % (ref 0–5)
ERYTHROCYTE [DISTWIDTH] IN BLOOD BY AUTOMATED COUNT: 12.2 % (ref 11.6–14.5)
EST. AVERAGE GLUCOSE BLD GHB EST-MCNC: 117 MG/DL
FLUAV AG NPH QL IA: NEGATIVE
FLUBV AG NOSE QL IA: NEGATIVE
GLOBULIN SER CALC-MCNC: 3.6 G/DL (ref 2–4)
GLUCOSE BLD STRIP.AUTO-MCNC: 158 MG/DL (ref 70–110)
GLUCOSE SERPL-MCNC: 99 MG/DL (ref 74–99)
HBA1C MFR BLD: 5.7 % (ref 4.2–5.6)
HCT VFR BLD AUTO: 36.7 % (ref 35–45)
HGB BLD-MCNC: 12.1 G/DL (ref 12–16)
LACTATE SERPL-SCNC: 2.1 MMOL/L (ref 0.4–2)
LACTATE SERPL-SCNC: 3.1 MMOL/L (ref 0.4–2)
LYMPHOCYTES # BLD: 1.2 K/UL (ref 0.9–3.6)
LYMPHOCYTES NFR BLD: 30 % (ref 21–52)
MCH RBC QN AUTO: 30.1 PG (ref 24–34)
MCHC RBC AUTO-ENTMCNC: 33 G/DL (ref 31–37)
MCV RBC AUTO: 91.3 FL (ref 74–97)
MONOCYTES # BLD: 0.5 K/UL (ref 0.05–1.2)
MONOCYTES NFR BLD: 14 % (ref 3–10)
NEUTS SEG # BLD: 1.9 K/UL (ref 1.8–8)
NEUTS SEG NFR BLD: 48 % (ref 40–73)
PLATELET # BLD AUTO: 311 K/UL (ref 135–420)
PMV BLD AUTO: 10.5 FL (ref 9.2–11.8)
POTASSIUM SERPL-SCNC: 3.7 MMOL/L (ref 3.5–5.5)
PROT SERPL-MCNC: 7.3 G/DL (ref 6.4–8.2)
RBC # BLD AUTO: 4.02 M/UL (ref 4.2–5.3)
SODIUM SERPL-SCNC: 137 MMOL/L (ref 136–145)
WBC # BLD AUTO: 3.9 K/UL (ref 4.6–13.2)

## 2020-02-14 PROCEDURE — 96365 THER/PROPH/DIAG IV INF INIT: CPT

## 2020-02-14 PROCEDURE — 96375 TX/PRO/DX INJ NEW DRUG ADDON: CPT

## 2020-02-14 PROCEDURE — 94762 N-INVAS EAR/PLS OXIMTRY CONT: CPT

## 2020-02-14 PROCEDURE — 87804 INFLUENZA ASSAY W/OPTIC: CPT

## 2020-02-14 PROCEDURE — 74011636637 HC RX REV CODE- 636/637: Performed by: HOSPITALIST

## 2020-02-14 PROCEDURE — 83605 ASSAY OF LACTIC ACID: CPT

## 2020-02-14 PROCEDURE — 85025 COMPLETE CBC W/AUTO DIFF WBC: CPT

## 2020-02-14 PROCEDURE — 74011250637 HC RX REV CODE- 250/637: Performed by: EMERGENCY MEDICINE

## 2020-02-14 PROCEDURE — 74011250636 HC RX REV CODE- 250/636: Performed by: HOSPITALIST

## 2020-02-14 PROCEDURE — 87040 BLOOD CULTURE FOR BACTERIA: CPT

## 2020-02-14 PROCEDURE — 74011250636 HC RX REV CODE- 250/636: Performed by: EMERGENCY MEDICINE

## 2020-02-14 PROCEDURE — 77030013140 HC MSK NEB VYRM -A

## 2020-02-14 PROCEDURE — 74011250637 HC RX REV CODE- 250/637: Performed by: HOSPITALIST

## 2020-02-14 PROCEDURE — 65270000029 HC RM PRIVATE

## 2020-02-14 PROCEDURE — 74011250636 HC RX REV CODE- 250/636: Performed by: INTERNAL MEDICINE

## 2020-02-14 PROCEDURE — 80053 COMPREHEN METABOLIC PANEL: CPT

## 2020-02-14 PROCEDURE — 83036 HEMOGLOBIN GLYCOSYLATED A1C: CPT

## 2020-02-14 PROCEDURE — 99285 EMERGENCY DEPT VISIT HI MDM: CPT

## 2020-02-14 PROCEDURE — 71250 CT THORAX DX C-: CPT

## 2020-02-14 PROCEDURE — 94640 AIRWAY INHALATION TREATMENT: CPT

## 2020-02-14 PROCEDURE — 74011000250 HC RX REV CODE- 250: Performed by: HOSPITALIST

## 2020-02-14 PROCEDURE — 71046 X-RAY EXAM CHEST 2 VIEWS: CPT

## 2020-02-14 PROCEDURE — 74011000250 HC RX REV CODE- 250: Performed by: EMERGENCY MEDICINE

## 2020-02-14 PROCEDURE — 82962 GLUCOSE BLOOD TEST: CPT

## 2020-02-14 RX ORDER — NALOXONE HYDROCHLORIDE 0.4 MG/ML
0.4 INJECTION, SOLUTION INTRAMUSCULAR; INTRAVENOUS; SUBCUTANEOUS AS NEEDED
Status: DISCONTINUED | OUTPATIENT
Start: 2020-02-14 | End: 2020-02-18 | Stop reason: HOSPADM

## 2020-02-14 RX ORDER — ARFORMOTEROL TARTRATE 15 UG/2ML
15 SOLUTION RESPIRATORY (INHALATION)
Status: DISCONTINUED | OUTPATIENT
Start: 2020-02-14 | End: 2020-02-18 | Stop reason: HOSPADM

## 2020-02-14 RX ORDER — BENZONATATE 100 MG/1
100 CAPSULE ORAL
Status: DISCONTINUED | OUTPATIENT
Start: 2020-02-14 | End: 2020-02-18 | Stop reason: HOSPADM

## 2020-02-14 RX ORDER — ALBUTEROL SULFATE 2.5 MG/.5ML
5 SOLUTION RESPIRATORY (INHALATION)
Status: COMPLETED | OUTPATIENT
Start: 2020-02-14 | End: 2020-02-14

## 2020-02-14 RX ORDER — DIPHENHYDRAMINE HYDROCHLORIDE 50 MG/ML
12.5 INJECTION, SOLUTION INTRAMUSCULAR; INTRAVENOUS
Status: DISCONTINUED | OUTPATIENT
Start: 2020-02-14 | End: 2020-02-18 | Stop reason: HOSPADM

## 2020-02-14 RX ORDER — DILTIAZEM HYDROCHLORIDE 180 MG/1
180 CAPSULE, COATED, EXTENDED RELEASE ORAL DAILY
Status: DISCONTINUED | OUTPATIENT
Start: 2020-02-15 | End: 2020-02-18 | Stop reason: HOSPADM

## 2020-02-14 RX ORDER — KETOROLAC TROMETHAMINE 30 MG/ML
15 INJECTION, SOLUTION INTRAMUSCULAR; INTRAVENOUS
Status: DISCONTINUED | OUTPATIENT
Start: 2020-02-14 | End: 2020-02-18 | Stop reason: HOSPADM

## 2020-02-14 RX ORDER — LEVETIRACETAM 500 MG/1
500 TABLET ORAL 2 TIMES DAILY
Status: DISCONTINUED | OUTPATIENT
Start: 2020-02-14 | End: 2020-02-18 | Stop reason: HOSPADM

## 2020-02-14 RX ORDER — LEVOFLOXACIN 5 MG/ML
750 INJECTION, SOLUTION INTRAVENOUS ONCE
Status: COMPLETED | OUTPATIENT
Start: 2020-02-14 | End: 2020-02-14

## 2020-02-14 RX ORDER — SODIUM CHLORIDE 9 MG/ML
75 INJECTION, SOLUTION INTRAVENOUS CONTINUOUS
Status: DISPENSED | OUTPATIENT
Start: 2020-02-14 | End: 2020-02-15

## 2020-02-14 RX ORDER — GUAIFENESIN 600 MG/1
600 TABLET, EXTENDED RELEASE ORAL EVERY 12 HOURS
Status: DISCONTINUED | OUTPATIENT
Start: 2020-02-14 | End: 2020-02-18 | Stop reason: HOSPADM

## 2020-02-14 RX ORDER — CODEINE PHOSPHATE AND GUAIFENESIN 10; 100 MG/5ML; MG/5ML
5 SOLUTION ORAL
Status: DISCONTINUED | OUTPATIENT
Start: 2020-02-14 | End: 2020-02-18 | Stop reason: HOSPADM

## 2020-02-14 RX ORDER — MONTELUKAST SODIUM 10 MG/1
10 TABLET ORAL DAILY
Status: DISCONTINUED | OUTPATIENT
Start: 2020-02-15 | End: 2020-02-18 | Stop reason: HOSPADM

## 2020-02-14 RX ORDER — HEPARIN SODIUM 5000 [USP'U]/ML
5000 INJECTION, SOLUTION INTRAVENOUS; SUBCUTANEOUS EVERY 8 HOURS
Status: DISCONTINUED | OUTPATIENT
Start: 2020-02-14 | End: 2020-02-18 | Stop reason: HOSPADM

## 2020-02-14 RX ORDER — BUDESONIDE 0.5 MG/2ML
500 INHALANT ORAL
Status: DISCONTINUED | OUTPATIENT
Start: 2020-02-14 | End: 2020-02-18 | Stop reason: HOSPADM

## 2020-02-14 RX ORDER — ALBUTEROL SULFATE 0.83 MG/ML
2.5 SOLUTION RESPIRATORY (INHALATION)
Status: DISCONTINUED | OUTPATIENT
Start: 2020-02-14 | End: 2020-02-15

## 2020-02-14 RX ORDER — ONDANSETRON 2 MG/ML
4 INJECTION INTRAMUSCULAR; INTRAVENOUS
Status: DISCONTINUED | OUTPATIENT
Start: 2020-02-14 | End: 2020-02-14

## 2020-02-14 RX ORDER — PROCHLORPERAZINE EDISYLATE 5 MG/ML
10 INJECTION INTRAMUSCULAR; INTRAVENOUS ONCE
Status: COMPLETED | OUTPATIENT
Start: 2020-02-14 | End: 2020-02-14

## 2020-02-14 RX ORDER — ONDANSETRON 2 MG/ML
4 INJECTION INTRAMUSCULAR; INTRAVENOUS
Status: DISCONTINUED | OUTPATIENT
Start: 2020-02-14 | End: 2020-02-18 | Stop reason: HOSPADM

## 2020-02-14 RX ORDER — SODIUM CHLORIDE 9 MG/ML
75 INJECTION, SOLUTION INTRAVENOUS CONTINUOUS
Status: DISCONTINUED | OUTPATIENT
Start: 2020-02-14 | End: 2020-02-14

## 2020-02-14 RX ORDER — ASPIRIN 81 MG/1
81 TABLET ORAL DAILY
Status: DISCONTINUED | OUTPATIENT
Start: 2020-02-15 | End: 2020-02-18 | Stop reason: HOSPADM

## 2020-02-14 RX ORDER — KETOROLAC TROMETHAMINE 30 MG/ML
30 INJECTION, SOLUTION INTRAMUSCULAR; INTRAVENOUS ONCE
Status: COMPLETED | OUTPATIENT
Start: 2020-02-14 | End: 2020-02-14

## 2020-02-14 RX ORDER — VANCOMYCIN 1.75 GRAM/500 ML IN 0.9 % SODIUM CHLORIDE INTRAVENOUS
1750 ONCE
Status: COMPLETED | OUTPATIENT
Start: 2020-02-14 | End: 2020-02-15

## 2020-02-14 RX ORDER — SAME BUTANEDISULFONATE/BETAINE 400-600 MG
500 POWDER IN PACKET (EA) ORAL 2 TIMES DAILY
Status: DISCONTINUED | OUTPATIENT
Start: 2020-02-14 | End: 2020-02-18 | Stop reason: HOSPADM

## 2020-02-14 RX ORDER — MAGNESIUM SULFATE 100 %
4 CRYSTALS MISCELLANEOUS AS NEEDED
Status: DISCONTINUED | OUTPATIENT
Start: 2020-02-14 | End: 2020-02-18 | Stop reason: HOSPADM

## 2020-02-14 RX ORDER — LEVOFLOXACIN 5 MG/ML
750 INJECTION, SOLUTION INTRAVENOUS EVERY 24 HOURS
Status: DISCONTINUED | OUTPATIENT
Start: 2020-02-14 | End: 2020-02-14

## 2020-02-14 RX ORDER — LEVOFLOXACIN 5 MG/ML
500 INJECTION, SOLUTION INTRAVENOUS EVERY 24 HOURS
Status: DISCONTINUED | OUTPATIENT
Start: 2020-02-15 | End: 2020-02-18 | Stop reason: HOSPADM

## 2020-02-14 RX ORDER — INSULIN LISPRO 100 [IU]/ML
INJECTION, SOLUTION INTRAVENOUS; SUBCUTANEOUS
Status: DISCONTINUED | OUTPATIENT
Start: 2020-02-14 | End: 2020-02-18 | Stop reason: HOSPADM

## 2020-02-14 RX ORDER — SODIUM CHLORIDE 9 MG/ML
250 INJECTION, SOLUTION INTRAVENOUS ONCE
Status: COMPLETED | OUTPATIENT
Start: 2020-02-14 | End: 2020-02-14

## 2020-02-14 RX ORDER — IPRATROPIUM BROMIDE AND ALBUTEROL SULFATE 2.5; .5 MG/3ML; MG/3ML
3 SOLUTION RESPIRATORY (INHALATION)
Status: COMPLETED | OUTPATIENT
Start: 2020-02-14 | End: 2020-02-14

## 2020-02-14 RX ADMIN — METHYLPREDNISOLONE SODIUM SUCCINATE 125 MG: 125 INJECTION, POWDER, FOR SOLUTION INTRAMUSCULAR; INTRAVENOUS at 17:12

## 2020-02-14 RX ADMIN — LEVETIRACETAM 500 MG: 500 TABLET ORAL at 20:38

## 2020-02-14 RX ADMIN — KETOROLAC TROMETHAMINE 15 MG: 30 INJECTION, SOLUTION INTRAMUSCULAR at 20:40

## 2020-02-14 RX ADMIN — BUDESONIDE 500 MCG: 0.5 INHALANT RESPIRATORY (INHALATION) at 21:39

## 2020-02-14 RX ADMIN — PROCHLORPERAZINE EDISYLATE 10 MG: 5 INJECTION INTRAMUSCULAR; INTRAVENOUS at 18:11

## 2020-02-14 RX ADMIN — AZITHROMYCIN MONOHYDRATE 500 MG: 500 INJECTION, POWDER, LYOPHILIZED, FOR SOLUTION INTRAVENOUS at 20:35

## 2020-02-14 RX ADMIN — SODIUM CHLORIDE 1000 ML: 900 INJECTION, SOLUTION INTRAVENOUS at 16:18

## 2020-02-14 RX ADMIN — METHYLPREDNISOLONE SODIUM SUCCINATE 60 MG: 125 INJECTION, POWDER, FOR SOLUTION INTRAMUSCULAR; INTRAVENOUS at 20:39

## 2020-02-14 RX ADMIN — KETOROLAC TROMETHAMINE 30 MG: 30 INJECTION, SOLUTION INTRAMUSCULAR at 16:03

## 2020-02-14 RX ADMIN — Medication 500 MG: at 20:39

## 2020-02-14 RX ADMIN — LEVOFLOXACIN 750 MG: 750 INJECTION, SOLUTION INTRAVENOUS at 16:19

## 2020-02-14 RX ADMIN — ONDANSETRON 4 MG: 2 INJECTION INTRAMUSCULAR; INTRAVENOUS at 23:43

## 2020-02-14 RX ADMIN — SODIUM CHLORIDE 75 ML/HR: 900 INJECTION, SOLUTION INTRAVENOUS at 20:23

## 2020-02-14 RX ADMIN — ALBUTEROL SULFATE 2.5 MG: 2.5 SOLUTION RESPIRATORY (INHALATION) at 21:39

## 2020-02-14 RX ADMIN — SODIUM CHLORIDE 250 ML: 9 INJECTION, SOLUTION INTRAVENOUS at 20:38

## 2020-02-14 RX ADMIN — BENZONATATE 100 MG: 100 CAPSULE ORAL at 18:12

## 2020-02-14 RX ADMIN — ARFORMOTEROL TARTRATE 15 MCG: 15 SOLUTION RESPIRATORY (INHALATION) at 21:39

## 2020-02-14 RX ADMIN — GUAIFENESIN AND CODEINE PHOSPHATE 5 ML: 100; 10 SOLUTION ORAL at 20:44

## 2020-02-14 RX ADMIN — INSULIN LISPRO 2 UNITS: 100 INJECTION, SOLUTION INTRAVENOUS; SUBCUTANEOUS at 21:04

## 2020-02-14 RX ADMIN — HEPARIN SODIUM 5000 UNITS: 5000 INJECTION INTRAVENOUS; SUBCUTANEOUS at 20:39

## 2020-02-14 RX ADMIN — ALBUTEROL SULFATE 5 MG: 2.5 SOLUTION RESPIRATORY (INHALATION) at 17:10

## 2020-02-14 RX ADMIN — VANCOMYCIN HYDROCHLORIDE 1750 MG: 10 INJECTION, POWDER, LYOPHILIZED, FOR SOLUTION INTRAVENOUS at 23:37

## 2020-02-14 RX ADMIN — IPRATROPIUM BROMIDE AND ALBUTEROL SULFATE 3 ML: .5; 3 SOLUTION RESPIRATORY (INHALATION) at 16:03

## 2020-02-14 RX ADMIN — SODIUM CHLORIDE 1000 ML: 900 INJECTION, SOLUTION INTRAVENOUS at 17:45

## 2020-02-14 RX ADMIN — GUAIFENESIN 600 MG: 600 TABLET, EXTENDED RELEASE ORAL at 20:38

## 2020-02-14 NOTE — ED TRIAGE NOTES
Pt arrives w/ c/o worsening of cough and shortness of breath with \"side pain\". Pt reports she was seen at [facility] on Wednesday diagnosed with pneumonia and given abx, worsening today.

## 2020-02-14 NOTE — H&P
History & Physical    Patient: Jareth Ordoñez MRN: [id number]  CSN: [id number]    YOB: 1960  Age: 63 y.o. Sex: female      DOA: 2/14/2020  Primary Care Provider:  Other, MD Kirk      Assessment/Plan     Hospital Problems  Never Reviewed          Codes Class Noted POA    Failure of outpatient treatment ICD-10-CM: Z78.9  ICD-9-CM: V49.89  2/14/2020 Yes        Moderate persistent asthma with acute exacerbation ICD-10-CM: J45.41  ICD-9-CM: 493.92  2/14/2020 Yes        CAP (community acquired pneumonia) ICD-10-CM: J18.9  ICD-9-CM: 968  2/14/2020 Unknown        Seizures ([facility] ICD-10-CM: R56.9  ICD-9-CM: 780.39  2/14/2020 Unknown        HTN (hypertension) ICD-10-CM: I10  ICD-9-CM: 401.9  11/1/2016 Yes        Community acquired pneumonia of right lower lobe of lung ([facility] ICD-10-CM: J18.1  ICD-9-CM: 683  11/1/2016 Unknown                Admit pt     Asthma exacerbation ,moderate persistent   Iv steroid, breathing treatment with bronchodilator   symptom treatment   ssi for glucose control     Cap-failed out -pt treatment , failed out -pt treatment   Pneumonia   continue levaquin ,add vanc/azithromycin   - will send /legionella antigen urine/strep pneumonia urine    influenza rapid screen negative    respiratory culture with gram stain  -will have ct chest      HTN, accelerated  Continue home medication. seizure continue keppra   Seizure precaution     Estimate  length of stay : 2-3 day    DVT : heparin ppi proph  CC: coughs and fever        HPI:     Jareth Ordoñez is a 63 y.o. female with PMHX of asthma, hypertension came to ER due to worsening cough and fever. She has been treated as pneumonia versus bronchitis as  outpatient for 3 weeks. Flu screen was negative before. But her symptoms did not really improve. she reported her shortness of breath and wheezing become worsening and  fever at home. Her lactic  acid was 2, white count was normal.  Chest x-ray indicated possible right-sided pneumonia. She received breathing treatment in ER and iv abx/fluid,  but her symptoms not improving. Denies any slurred speech/headache/cp/n/v/blurred vission/d/c/palpitation/gait change/bleeding. Denies smoking/ any alcohol or drug use. Admission and treatment discussed with patient and family, all agree and indicated a verbal understanding   Visit Vitals  /67   Pulse (!) 110   Temp 97.9 °F (36.6 °C)   Resp 20   Ht 5' 2\" (1.575 m)   Wt 77.1 kg (170 lb)   SpO2 100%   BMI 31.09 kg/m²      O2 Device: Room air      Past Medical History:   Diagnosis Date    Arthritis     Asthma     Chronic kidney disease     deformed right  kidney    Diabetes ([facility]     Hypertension     Migraines     PUD (peptic ulcer disease)     H/O, no meds at this time 2016    Seizures ([facility]     Unspecified sleep apnea     uses cpap       Past Surgical History:   Procedure Laterality Date    HX APPENDECTOMY      HX GYN      hysterectomy       Family History   Problem Relation Age of Onset    Malignant Hyperthermia Neg Hx     Pseudocholinesterase Deficiency Neg Hx     Delayed Awakening Neg Hx     Post-op Nausea/Vomiting Neg Hx     Emergence Delirium Neg Hx     Post-op Cognitive Dysfunction Neg Hx     Other Neg Hx        Social History     Socioeconomic History    Marital status:      Spouse name: Not on file    Number of children: Not on file    Years of education: Not on file    Highest education level: Not on file   Tobacco Use    Smoking status: Never Smoker    Smokeless tobacco: Never Used   Substance and Sexual Activity    Alcohol use: No    Drug use: No       Prior to Admission medications    Medication Sig Start Date End Date Taking? Authorizing Provider   CASCARA SAGRADA PO Take  by mouth. OtherKirk MD   dilTIAZem XR (DILACOR XR) 180 mg XR capsule Take  by mouth daily.     Kirk Ryan MD   albuterol (PROVENTIL HFA, VENTOLIN HFA, PROAIR HFA) 90 mcg/actuation inhaler Take 2 Puffs by inhalation every four (4) hours as needed for Wheezing or Shortness of Breath. 1/9/19   Maria Elena Ann PA-C   inhalational spacing device 1 Each by Does Not Apply route as needed. Please dispense one adult spacer to be used with albuterol HFA. 1/9/19   Maria Elena Ann PA-C   predniSONE (STERAPRED DS) 10 mg dose pack Take with food. 1/9/19   Billy Ramires PA-C   albuterol (ACCUNEB) 1.25 mg/3 mL nebu Take 3 mL by inhalation every four (4) hours as needed (wheezing). 1/9/19   Billy Ramires PA-C   ondansetron (ZOFRAN ODT) 4 mg disintegrating tablet Take 1 Tab by mouth every eight (8) hours as needed for Nausea. 1/9/19   Lv Ramires PA-C   guaiFENesin-codeine (ROBITUSSIN AC) 100-10 mg/5 mL solution Take 5 mL by mouth three (3) times daily as needed for Cough. Max Daily Amount: 15 mL. 1/9/19   Billy Ramires PA-C   clonazePAM (KLONOPIN) 0.5 mg tablet Take 1 Tab by mouth three (3) times daily. Max Daily Amount: 1.5 mg. 3/26/17   Reynaldo Singh MD   dronabinol (MARINOL) 2.5 mg capsule Take 1 Cap by mouth Before breakfast and dinner. Max Daily Amount: 5 mg. 3/26/17   Reynaldo Singh MD   montelukast (SINGULAIR) 10 mg tablet Take 10 mg by mouth daily. Provider, Historical   naproxen (NAPROSYN) 500 mg tablet Take 500 mg by mouth two (2) times daily as needed. Provider, Historical   raNITIdine (ZANTAC) 150 mg tablet Take 150 mg by mouth two (2) times a day. Michael Washington MD   conjugated estrogens (PREMARIN) 0.9 mg tablet Take 0.9 mg by mouth daily. Michael Washington MD   acetaminophen (TYLENOL) 500 mg tablet Take 1,000 mg by mouth every six (6) hours as needed for Pain or Fever. Michael Washington MD   fluticasone-salmeterol (ADVAIR) 250-50 mcg/dose diskus inhaler Take 1 Puff by inhalation two (2) times a day. 11/9/16   Jon Vera MD   escitalopram oxalate (LEXAPRO) 10 mg tablet Take 15 mg by mouth daily. Provider, Historical   aspirin delayed-release 81 mg tablet Take 81 mg by mouth daily. Provider, Neil   levETIRAcetam (KEPPRA) 500 mg tablet Take 500 mg by mouth two (2) times a day. Kirk Ryan MD   SUMAtriptan succinate 6 mg/0.5 mL kit 6 mg by SubCUTAneous route once as needed for Migraine. Kirk Ryan MD   losartan-hydrochlorothiazide (HYZAAR) 100-25 mg per tablet Take 1 Tab by mouth daily. Kirk Ryan MD   OXcarbazepine (TRILEPTAL) 300 mg tablet Take 300 mg by mouth two (2) times a day. Kirk Ryan MD       Allergies   Allergen Reactions    Morphine Itching     Can tolerate if premed w/ benadryl    Pertussis Vaccine,Adsorbed Swelling     Swelling at injection site    Vicodin [Hydrocodone-Acetaminophen] Itching     Can tolerate if premed w/ benadryl       Review of Systems  Gen: + fever, +chills, +malaise, no weight loss/gain. Heent: No headache, rhinorrhea, epistaxis, ear pain, hearing loss, sinus pain, neck pain/stiffness, sore throat. Heart: No chest pain, palpitations, DELATORRE, pnd, or orthopnea. Resp: +cough, no  hemoptysis, +wheezing and shortness of breath. GI: No nausea, vomiting, diarrhea, constipation, melena or hematochezia. : No urinary obstruction, dysuria or hematuria. Derm: No rash, new skin lesion or pruritis. Musc/skeletal: no bone or joint complains. Vasc: No edema, cyanosis or claudication. Endo: No heat/cold intolerance, no polyuria,polydipsia or polyphagia. Neuro: No unilateral weakness, numbness, tingling. No seizures. Heme: No easy bruising or bleeding. Physical Exam:     Physical Exam:  Visit Vitals  /67   Pulse (!) 110   Temp 97.9 °F (36.6 °C)   Resp 20   Ht 5' 2\" (1.575 m)   Wt 77.1 kg (170 lb)   SpO2 100%   BMI 31.09 kg/m²      O2 Device: Room air    Temp (24hrs), Av.2 °F (36.8 °C), Min:97.9 °F (36.6 °C), Max:98.5 °F (36.9 °C)     0701 -  1900  In: 2150 [I.V.:]  Out: -    No intake/output data recorded. General:  Awake, cooperative, no distress.    Head:  Normocephalic, without obvious abnormality, atraumatic. Eyes:  Conjunctivae/corneas clear, sclera anicteric, PERRL, EOMs intact. Nose: Nares normal. No drainage or sinus tenderness. Throat: Lips, mucosa, and tongue normal. .   Neck: Supple, symmetrical, trachea midline, no adenopathy. Lungs:   Bilateral wheezing, rales        Heart:  Regular rate and rhythm, S1, S2 normal, no murmur, click, rub or gallop. Abdomen: Soft, non-tender. Bowel sounds normal. No masses,  No organomegaly. Extremities: Extremities normal, atraumatic, no cyanosis or edema. Pulses: 2+ and symmetric all extremities. Skin: Skin color-pink, texture, turgor normal. No rashes or lesions. Capillary refill normal    Neurologic: CNII-XII intact. No focal motor or sensory deficit.        Labs Reviewed:    BMP:   Lab Results   Component Value Date/Time     02/14/2020 03:53 PM    K 3.7 02/14/2020 03:53 PM     02/14/2020 03:53 PM    CO2 31 02/14/2020 03:53 PM    AGAP 5 02/14/2020 03:53 PM    GLU 99 02/14/2020 03:53 PM    BUN 18 02/14/2020 03:53 PM    CREA 1.22 02/14/2020 03:53 PM    GFRAA 55 (L) 02/14/2020 03:53 PM    GFRNA 45 (L) 02/14/2020 03:53 PM     CMP:   Lab Results   Component Value Date/Time     02/14/2020 03:53 PM    K 3.7 02/14/2020 03:53 PM     02/14/2020 03:53 PM    CO2 31 02/14/2020 03:53 PM    AGAP 5 02/14/2020 03:53 PM    GLU 99 02/14/2020 03:53 PM    BUN 18 02/14/2020 03:53 PM    CREA 1.22 02/14/2020 03:53 PM    GFRAA 55 (L) 02/14/2020 03:53 PM    GFRNA 45 (L) 02/14/2020 03:53 PM    CA 8.7 02/14/2020 03:53 PM    ALB 3.7 02/14/2020 03:53 PM    TP 7.3 02/14/2020 03:53 PM    GLOB 3.6 02/14/2020 03:53 PM    AGRAT 1.0 02/14/2020 03:53 PM    SGOT 19 02/14/2020 03:53 PM    ALT 18 02/14/2020 03:53 PM     CBC:   Lab Results   Component Value Date/Time    WBC 3.9 (L) 02/14/2020 03:53 PM    HGB 12.1 02/14/2020 03:53 PM    HCT 36.7 02/14/2020 03:53 PM     02/14/2020 03:53 PM     All Cardiac Markers in the last 24 hours: No results found for: CPK, CK, CKMMB, CKMB, RCK3, CKMBT, CKNDX, CKND1, YAMILKA, TROPT, TROIQ, LISA, TROPT, TNIPOC, BNP, BNPP  Recent Glucose Results:   Lab Results   Component Value Date/Time    GLU 99 02/14/2020 03:53 PM     ABG: No results found for: PH, PHI, PCO2, PCO2I, PO2, PO2I, HCO3, HCO3I, FIO2, FIO2I  COAGS: No results found for: APTT, PTP, INR, INREXT, INREXT  Liver Panel:   Lab Results   Component Value Date/Time    ALB 3.7 02/14/2020 03:53 PM    TP 7.3 02/14/2020 03:53 PM    GLOB 3.6 02/14/2020 03:53 PM    AGRAT 1.0 02/14/2020 03:53 PM    SGOT 19 02/14/2020 03:53 PM    ALT 18 02/14/2020 03:53 PM    AP 85 02/14/2020 03:53 PM     Pancreatic Markers: No results found for: AMYLPOCT, AML, LIPPOCT, LPSE    Procedures/imaging: see electronic medical records for all procedures/Xrays and details which were not copied into this note but were reviewed prior to creation of Madelyn Oleary MD, Internal Medicine     CC: Kirk Ryan MD

## 2020-02-14 NOTE — ED PROVIDER NOTES
EMERGENCY DEPARTMENT HISTORY AND PHYSICAL EXAM    Date: 2/14/2020  Patient Name: Karlos Munoz    History of Presenting Illness     Chief Complaint   Patient presents with    Shortness of Breath         History Provided By: Patient    Karlos Munoz is a 63 y.o. female who presents to the emergency department C/O pneumonia. Patient states she was recently seen at [facility] 2 days ago where she had a chest x-ray done as well as a flu swab. She states she was diagnosed with pneumonia and started on antibiotics, Augmentin and azithromycin. States she was also given some Robitussin with codeine and meloxicam for her cough and body aches. States she has been taking all the medications but does not seem to be helping. She states she is having pain all over. States she has been persistently coughing and feeling very fatigued and short of breath. .     PCP: Kirk Ryan MD    Current Facility-Administered Medications   Medication Dose Route Frequency Provider Last Rate Last Dose    levoFLOXacin (LEVAQUIN) 750 mg in D5W IVPB  750 mg IntraVENous ONCE Ajit Rasmussen  mL/hr at 02/14/20 1619 750 mg at 02/14/20 1619    sodium chloride 0.9 % bolus infusion 1,000 mL  1,000 mL IntraVENous ONCE Ajit Rasmussen, DO        albuterol (PROVENTIL VENTOLIN) nebulizer solution 2.5 mg  2.5 mg Nebulization Q4HWA RT Anastasiya Barriga MD         Current Outpatient Medications   Medication Sig Dispense Refill    CASCARA SAGRADA PO Take  by mouth.  dilTIAZem XR (DILACOR XR) 180 mg XR capsule Take  by mouth daily.  albuterol (PROVENTIL HFA, VENTOLIN HFA, PROAIR HFA) 90 mcg/actuation inhaler Take 2 Puffs by inhalation every four (4) hours as needed for Wheezing or Shortness of Breath. 1 Inhaler 1    inhalational spacing device 1 Each by Does Not Apply route as needed. Please dispense one adult spacer to be used with albuterol HFA. 1 Device 0    predniSONE (STERAPRED DS) 10 mg dose pack Take with food.  21 Tab 0    albuterol (ACCUNEB) 1.25 mg/3 mL nebu Take 3 mL by inhalation every four (4) hours as needed (wheezing). 25 Each 0    ondansetron (ZOFRAN ODT) 4 mg disintegrating tablet Take 1 Tab by mouth every eight (8) hours as needed for Nausea. 12 Tab 0    guaiFENesin-codeine (ROBITUSSIN AC) 100-10 mg/5 mL solution Take 5 mL by mouth three (3) times daily as needed for Cough. Max Daily Amount: 15 mL. 160 mL 0    clonazePAM (KLONOPIN) 0.5 mg tablet Take 1 Tab by mouth three (3) times daily. Max Daily Amount: 1.5 mg. 20 Tab 0    dronabinol (MARINOL) 2.5 mg capsule Take 1 Cap by mouth Before breakfast and dinner. Max Daily Amount: 5 mg. 20 Cap 0    montelukast (SINGULAIR) 10 mg tablet Take 10 mg by mouth daily.  naproxen (NAPROSYN) 500 mg tablet Take 500 mg by mouth two (2) times daily as needed.  raNITIdine (ZANTAC) 150 mg tablet Take 150 mg by mouth two (2) times a day.  conjugated estrogens (PREMARIN) 0.9 mg tablet Take 0.9 mg by mouth daily.  acetaminophen (TYLENOL) 500 mg tablet Take 1,000 mg by mouth every six (6) hours as needed for Pain or Fever.  fluticasone-salmeterol (ADVAIR) 250-50 mcg/dose diskus inhaler Take 1 Puff by inhalation two (2) times a day. 1 Inhaler 1    escitalopram oxalate (LEXAPRO) 10 mg tablet Take 15 mg by mouth daily.  aspirin delayed-release 81 mg tablet Take 81 mg by mouth daily.  levETIRAcetam (KEPPRA) 500 mg tablet Take 500 mg by mouth two (2) times a day.  SUMAtriptan succinate 6 mg/0.5 mL kit 6 mg by SubCUTAneous route once as needed for Migraine.  losartan-hydrochlorothiazide (HYZAAR) 100-25 mg per tablet Take 1 Tab by mouth daily.  OXcarbazepine (TRILEPTAL) 300 mg tablet Take 300 mg by mouth two (2) times a day.          Past History     Past Medical History:  Past Medical History:   Diagnosis Date    Arthritis     Asthma     Chronic kidney disease     deformed right  kidney    Diabetes ([address]     Hypertension     Migraines     PUD (peptic ulcer disease)     H/O, no meds at this time 2016    Seizures ([facility]     Unspecified sleep apnea     uses cpap       Past Surgical History:  Past Surgical History:   Procedure Laterality Date    HX APPENDECTOMY      HX GYN      hysterectomy       Family History:  Family History   Problem Relation Age of Onset    Malignant Hyperthermia Neg Hx     Pseudocholinesterase Deficiency Neg Hx     Delayed Awakening Neg Hx     Post-op Nausea/Vomiting Neg Hx     Emergence Delirium Neg Hx     Post-op Cognitive Dysfunction Neg Hx     Other Neg Hx        Social History:  Social History     Tobacco Use    Smoking status: Never Smoker    Smokeless tobacco: Never Used   Substance Use Topics    Alcohol use: No    Drug use: No       Allergies: Allergies   Allergen Reactions    Morphine Itching     Can tolerate if premed w/ benadryl    Pertussis Vaccine,Adsorbed Swelling     Swelling at injection site    Vicodin [Hydrocodone-Acetaminophen] Itching     Can tolerate if premed w/ benadryl         Review of Systems   Review of Systems   Constitutional: Positive for activity change, appetite change, chills, fatigue and fever. HENT: Positive for congestion. Respiratory: Positive for cough, shortness of breath and wheezing.           Physical Exam     Vitals:    02/14/20 1532 02/14/20 1615 02/14/20 1630   BP: 116/59 114/75 126/70   Pulse: 87 88 84   Resp: 22 21 22   Temp: 98.5 °F (36.9 °C)     SpO2: 100% 100% 100%   Weight: 77.1 kg (170 lb)     Height: 5' 2\" (1.575 m)       Physical Exam    Nursing notes and vital signs reviewed    Airway: intact, speaking normally  Breathing: No apparent distress, no cyanosis  Circulation: Peripheral pulses equal    Constitutional: Uncomfortable appearing, fatigued in mild distress but nontoxic  HEENT & Neck: Normocephalic, Atraumatic, PERRL, EOMI, No rhinorrhea, external nose normal, external ears normal, mucous membranes moist, No stridor, No JVD  Cardiovascular: Regular rate and rhythm, no murmurs  Chest: Normal work of breathing and chest excursion bilaterally  Lungs: Persistent cough with wheezes bilaterally  Abdomen: Soft, non tender, non distended, normoactive bowel sounds, No rigidity, no peritoneal signs  Musculoskeletal: No evidence of trauma or deformity to the back or neck  Extremities: No evidence of trauma or deformity, no LE edema  Skin: Warm, No obvious rashes  Neuro: Somnolent and oriented x 3, CN 2-12 intact, normal speech, strength and sensation full and symmetric bilaterally, normal coordination  Psychiatric: Normal mood and affect      Diagnostic Study Results     Labs -     Recent Results (from the past 72 hour(s))   CBC WITH AUTOMATED DIFF    Collection Time: 02/14/20  3:53 PM   Result Value Ref Range    WBC 3.9 (L) 4.6 - 13.2 K/uL    RBC 4.02 (L) 4.20 - 5.30 M/uL    HGB 12.1 12.0 - 16.0 g/dL    HCT 36.7 35.0 - 45.0 %    MCV 91.3 74.0 - 97.0 FL    MCH 30.1 24.0 - 34.0 PG    MCHC 33.0 31.0 - 37.0 g/dL    RDW 12.2 11.6 - 14.5 %    PLATELET 312 329 - 143 K/uL    MPV 10.5 9.2 - 11.8 FL    NEUTROPHILS 48 40 - 73 %    LYMPHOCYTES 30 21 - 52 %    MONOCYTES 14 (H) 3 - 10 %    EOSINOPHILS 8 (H) 0 - 5 %    BASOPHILS 0 0 - 2 %    ABS. NEUTROPHILS 1.9 1.8 - 8.0 K/UL    ABS. LYMPHOCYTES 1.2 0.9 - 3.6 K/UL    ABS. MONOCYTES 0.5 0.05 - 1.2 K/UL    ABS. EOSINOPHILS 0.3 0.0 - 0.4 K/UL    ABS.  BASOPHILS 0.0 0.0 - 0.1 K/UL    DF AUTOMATED     METABOLIC PANEL, COMPREHENSIVE    Collection Time: 02/14/20  3:53 PM   Result Value Ref Range    Sodium 137 136 - 145 mmol/L    Potassium 3.7 3.5 - 5.5 mmol/L    Chloride 101 100 - 111 mmol/L    CO2 31 21 - 32 mmol/L    Anion gap 5 3.0 - 18 mmol/L    Glucose 99 74 - 99 mg/dL    BUN 18 7.0 - 18 MG/DL    Creatinine 1.22 0.6 - 1.3 MG/DL    BUN/Creatinine ratio 15 12 - 20      GFR est AA 55 (L) >60 ml/min/1.73m2    GFR est non-AA 45 (L) >60 ml/min/1.73m2    Calcium 8.7 8.5 - 10.1 MG/DL    Bilirubin, total 0.2 0.2 - 1.0 MG/DL    ALT (SGPT) 18 13 - 56 U/L    AST (SGOT) 19 10 - 38 U/L    Alk. phosphatase 85 45 - 117 U/L    Protein, total 7.3 6.4 - 8.2 g/dL    Albumin 3.7 3.4 - 5.0 g/dL    Globulin 3.6 2.0 - 4.0 g/dL    A-G Ratio 1.0 0.8 - 1.7     LACTIC ACID    Collection Time: 02/14/20  3:53 PM   Result Value Ref Range    Lactic acid 2.1 (HH) 0.4 - 2.0 MMOL/L   INFLUENZA A & B AG (RAPID TEST)    Collection Time: 02/14/20  4:07 PM   Result Value Ref Range    Influenza A Antigen NEGATIVE  NEG      Influenza B Antigen NEGATIVE  NEG         Radiologic Studies -   XR CHEST PA LAT   Final Result   IMPRESSION:      Mildly underexpanded lungs with likely infiltrate in the medial right lung base. No accompanying pleural effusion. Recommend radiographic follow-up to document   expected clinical resolution in 4-6 weeks' time. CT Results  (Last 48 hours)    None        CXR Results  (Last 48 hours)               02/14/20 1711  XR CHEST PA LAT Final result    Impression:  IMPRESSION:       Mildly underexpanded lungs with likely infiltrate in the medial right lung base. No accompanying pleural effusion. Recommend radiographic follow-up to document   expected clinical resolution in 4-6 weeks' time. Narrative:  EXAM: XR CHEST PA LAT       CLINICAL INDICATION/HISTORY: cough   -Additional: None       COMPARISON: Several prior exams, most recently 1/9/2019       TECHNIQUE: PA and lateral views of the chest       _______________       FINDINGS:       HEART AND MEDIASTINUM: Cardiac size and mediastinal contours are within normal   limits       LUNGS AND PLEURAL SPACES: Lungs are mildly underexpanded. Asymmetric area of   opacity at the medial right lung base. No pneumothorax or pleural effusion.         BONY THORAX AND SOFT TISSUES: No acute osseous abnormality       _______________                 Medications given in the ED-  Medications   levoFLOXacin (LEVAQUIN) 750 mg in D5W IVPB (750 mg IntraVENous New Bag 2/14/20 5433)   sodium chloride 0.9 % bolus infusion 1,000 mL (has no administration in time range)   albuterol (PROVENTIL VENTOLIN) nebulizer solution 2.5 mg (has no administration in time range)   albuterol-ipratropium (DUO-NEB) 2.5 MG-0.5 MG/3 ML (3 mL Nebulization Given 2/14/20 1603)   ketorolac (TORADOL) injection 30 mg (30 mg IntraVENous Given 2/14/20 1603)   sodium chloride 0.9 % bolus infusion 1,000 mL (1,000 mL IntraVENous New Bag 2/14/20 1618)   albuterol CONCENTRATE 2.5mg/0.5 mL neb soln (5 mg Nebulization Given 2/14/20 1710)   methylPREDNISolone (PF) (Solu-MEDROL) injection 125 mg (125 mg IntraVENous Given 2/14/20 1712)         Medical Decision Making   I am the first provider for this patient. I reviewed the vital signs, available nursing notes, past medical history, past surgical history, family history and social history. Vital Signs-Reviewed the patient's vital signs. Pulse Oximetry Analysis - 100% on Room air     Cardiac Monitor:  Rate: 87 bpm  Rhythm: sinus    Records Reviewed: Nursing Notes    Procedures:  Procedures    ED Course:   Considering the patient's recent antibiotic use will treat with Levaquin at this time. She was given DuoNeb breathing treatment with minimal improvement of her wheezing and cough. She was subsequently given another dose of albuterol 5 mg. Considering her history of asthma we will treat with some steroids and give Solu-Medrol. Laboratory findings showing elevated lactic acid likely secondary to dehydration more so than sepsis as her white blood cell count is normal and the remainder of her vital signs are stable. Chest x-ray does note the right sided pneumonia as seen prior, influenza negative. Patient was given 2 L of fluids and Toradol for her aches and pains. She states she is still feeling very fatigued,  at bedside states that she does still appear very ill. Considering her clinical appearance we will plan for admission.   Patient is agreeable to plan    Diagnosis and Disposition     Core Measures:  For Hospitalized Patients:    1. Hospitalization Decision Time:  The decision to hospitalize the patient was made by Edgard Wu DO at 5:33 PM on 2/14/2020    2. Aspirin: Aspirin was not given because the patient did not present with a stroke at the time of their Emergency Department evaluation    5:32 PM  Patient is being admitted to the hospital by Dr. Will Mehta. The results of their tests and reasons for their admission have been discussed with them and/or available family. They convey agreement and understanding for the need to be admitted and for their admission diagnosis. CONDITIONS ON ADMISSION:  Sepsis is not present at the time of admission. Deep Vein Thrombosis is not present at the time of admission. Thrombosis is not present at the time of admission. Urinary Tract Infection is not present at the time of admission. Pneumonia is present at the time of admission. MRSA possibly present at the time of admission. Wound infection is not present at the time of admission. Pressure Ulcer is not present at the time of admission. CLINICAL IMPRESSION:    1. Community acquired pneumonia of right lower lobe of lung ([address]    2. Failure of outpatient treatment    3. Moderate persistent asthma with acute exacerbation          Please note that this dictation was completed with [employer], the [employer] voice recognition software. Quite often unanticipated grammatical, syntax, homophones, and other interpretive errors are inadvertently transcribed by the computer software. Please disregard these errors. Please excuse any errors that have escaped final proofreading.

## 2020-02-15 LAB
ANION GAP SERPL CALC-SCNC: 6 MMOL/L (ref 3–18)
APPEARANCE UR: CLEAR
ARTERIAL PATENCY WRIST A: YES
BASE DEFICIT BLDV-SCNC: 1 MMOL/L
BASOPHILS # BLD: 0 K/UL (ref 0–0.1)
BASOPHILS NFR BLD: 0 % (ref 0–2)
BDY SITE: ABNORMAL
BILIRUB UR QL: NEGATIVE
BUN SERPL-MCNC: 16 MG/DL (ref 7–18)
BUN/CREAT SERPL: 15 (ref 12–20)
CALCIUM SERPL-MCNC: 7.5 MG/DL (ref 8.5–10.1)
CHLORIDE SERPL-SCNC: 109 MMOL/L (ref 100–111)
CO2 SERPL-SCNC: 26 MMOL/L (ref 21–32)
COLOR UR: YELLOW
CREAT SERPL-MCNC: 1.07 MG/DL (ref 0.6–1.3)
DIFFERENTIAL METHOD BLD: ABNORMAL
EOSINOPHIL # BLD: 0 K/UL (ref 0–0.4)
EOSINOPHIL NFR BLD: 0 % (ref 0–5)
ERYTHROCYTE [DISTWIDTH] IN BLOOD BY AUTOMATED COUNT: 12.4 % (ref 11.6–14.5)
GAS FLOW.O2 O2 DELIVERY SYS: ABNORMAL L/MIN
GLUCOSE BLD STRIP.AUTO-MCNC: 112 MG/DL (ref 70–110)
GLUCOSE BLD STRIP.AUTO-MCNC: 123 MG/DL (ref 70–110)
GLUCOSE BLD STRIP.AUTO-MCNC: 138 MG/DL (ref 70–110)
GLUCOSE BLD STRIP.AUTO-MCNC: 171 MG/DL (ref 70–110)
GLUCOSE SERPL-MCNC: 150 MG/DL (ref 74–99)
GLUCOSE UR STRIP.AUTO-MCNC: NEGATIVE MG/DL
HCO3 BLDV-SCNC: 24.3 MMOL/L (ref 23–28)
HCT VFR BLD AUTO: 30.4 % (ref 35–45)
HGB BLD-MCNC: 9.9 G/DL (ref 12–16)
HGB UR QL STRIP: NEGATIVE
KETONES UR QL STRIP.AUTO: NEGATIVE MG/DL
L PNEUMO AG UR QL IA: NEGATIVE
LACTATE SERPL-SCNC: 1.8 MMOL/L (ref 0.4–2)
LACTATE SERPL-SCNC: 5.1 MMOL/L (ref 0.4–2)
LEUKOCYTE ESTERASE UR QL STRIP.AUTO: NEGATIVE
LYMPHOCYTES # BLD: 0.4 K/UL (ref 0.9–3.6)
LYMPHOCYTES NFR BLD: 7 % (ref 21–52)
MAGNESIUM SERPL-MCNC: 2.2 MG/DL (ref 1.6–2.6)
MCH RBC QN AUTO: 30.4 PG (ref 24–34)
MCHC RBC AUTO-ENTMCNC: 32.6 G/DL (ref 31–37)
MCV RBC AUTO: 93.3 FL (ref 74–97)
MONOCYTES # BLD: 0.1 K/UL (ref 0.05–1.2)
MONOCYTES NFR BLD: 1 % (ref 3–10)
NEUTS SEG # BLD: 4.6 K/UL (ref 1.8–8)
NEUTS SEG NFR BLD: 92 % (ref 40–73)
NITRITE UR QL STRIP.AUTO: NEGATIVE
O2/TOTAL GAS SETTING VFR VENT: 21 %
PCO2 BLDV: 43 MMHG (ref 41–51)
PH BLDV: 7.36 [PH] (ref 7.32–7.42)
PH UR STRIP: 5.5 [PH] (ref 5–8)
PLATELET # BLD AUTO: 249 K/UL (ref 135–420)
PMV BLD AUTO: 10.1 FL (ref 9.2–11.8)
PO2 BLDV: 30 MMHG (ref 25–40)
POTASSIUM SERPL-SCNC: 4.1 MMOL/L (ref 3.5–5.5)
PROT UR STRIP-MCNC: NEGATIVE MG/DL
RBC # BLD AUTO: 3.26 M/UL (ref 4.2–5.3)
RBC MORPH BLD: ABNORMAL
S PNEUM AG UR QL: NEGATIVE
SAO2 % BLDV: 54 % (ref 65–88)
SERVICE CMNT-IMP: ABNORMAL
SODIUM SERPL-SCNC: 141 MMOL/L (ref 136–145)
SP GR UR REFRACTOMETRY: 1.02 (ref 1–1.03)
SPECIMEN TYPE: ABNORMAL
TOTAL RESP. RATE, ITRR: 20
UROBILINOGEN UR QL STRIP.AUTO: 0.2 EU/DL (ref 0.2–1)
WBC # BLD AUTO: 5.1 K/UL (ref 4.6–13.2)

## 2020-02-15 PROCEDURE — 94760 N-INVAS EAR/PLS OXIMETRY 1: CPT

## 2020-02-15 PROCEDURE — 82803 BLOOD GASES ANY COMBINATION: CPT

## 2020-02-15 PROCEDURE — 83605 ASSAY OF LACTIC ACID: CPT

## 2020-02-15 PROCEDURE — 74011250637 HC RX REV CODE- 250/637: Performed by: EMERGENCY MEDICINE

## 2020-02-15 PROCEDURE — 36415 COLL VENOUS BLD VENIPUNCTURE: CPT

## 2020-02-15 PROCEDURE — 94640 AIRWAY INHALATION TREATMENT: CPT

## 2020-02-15 PROCEDURE — 74011636637 HC RX REV CODE- 636/637: Performed by: HOSPITALIST

## 2020-02-15 PROCEDURE — 74011250636 HC RX REV CODE- 250/636: Performed by: HOSPITALIST

## 2020-02-15 PROCEDURE — 87070 CULTURE OTHR SPECIMN AEROBIC: CPT

## 2020-02-15 PROCEDURE — 87633 RESP VIRUS 12-25 TARGETS: CPT

## 2020-02-15 PROCEDURE — 85025 COMPLETE CBC W/AUTO DIFF WBC: CPT

## 2020-02-15 PROCEDURE — 74011000250 HC RX REV CODE- 250: Performed by: HOSPITALIST

## 2020-02-15 PROCEDURE — 74011250636 HC RX REV CODE- 250/636: Performed by: INTERNAL MEDICINE

## 2020-02-15 PROCEDURE — 65270000029 HC RM PRIVATE

## 2020-02-15 PROCEDURE — 83735 ASSAY OF MAGNESIUM: CPT

## 2020-02-15 PROCEDURE — 80048 BASIC METABOLIC PNL TOTAL CA: CPT

## 2020-02-15 PROCEDURE — 74011250637 HC RX REV CODE- 250/637: Performed by: HOSPITALIST

## 2020-02-15 PROCEDURE — 87449 NOS EACH ORGANISM AG IA: CPT

## 2020-02-15 PROCEDURE — 74011000258 HC RX REV CODE- 258: Performed by: INTERNAL MEDICINE

## 2020-02-15 PROCEDURE — 82962 GLUCOSE BLOOD TEST: CPT

## 2020-02-15 PROCEDURE — 81003 URINALYSIS AUTO W/O SCOPE: CPT

## 2020-02-15 PROCEDURE — 87450 LEGIONELLA PNEUMOPHILA AG, URINE: CPT

## 2020-02-15 RX ORDER — SODIUM CHLORIDE 9 MG/ML
125 INJECTION, SOLUTION INTRAVENOUS CONTINUOUS
Status: DISCONTINUED | OUTPATIENT
Start: 2020-02-15 | End: 2020-02-15

## 2020-02-15 RX ORDER — SODIUM CHLORIDE 9 MG/ML
75 INJECTION, SOLUTION INTRAVENOUS CONTINUOUS
Status: DISPENSED | OUTPATIENT
Start: 2020-02-15 | End: 2020-02-15

## 2020-02-15 RX ORDER — ALBUTEROL SULFATE 0.83 MG/ML
2.5 SOLUTION RESPIRATORY (INHALATION)
Status: DISCONTINUED | OUTPATIENT
Start: 2020-02-15 | End: 2020-02-18 | Stop reason: HOSPADM

## 2020-02-15 RX ADMIN — ONDANSETRON 4 MG: 2 INJECTION INTRAMUSCULAR; INTRAVENOUS at 21:18

## 2020-02-15 RX ADMIN — HEPARIN SODIUM 5000 UNITS: 5000 INJECTION INTRAVENOUS; SUBCUTANEOUS at 12:29

## 2020-02-15 RX ADMIN — VANCOMYCIN HYDROCHLORIDE 1250 MG: 1.25 INJECTION, POWDER, LYOPHILIZED, FOR SOLUTION INTRAVENOUS at 12:30

## 2020-02-15 RX ADMIN — BUDESONIDE 500 MCG: 0.5 INHALANT RESPIRATORY (INHALATION) at 07:52

## 2020-02-15 RX ADMIN — Medication 500 MG: at 09:21

## 2020-02-15 RX ADMIN — Medication 500 MG: at 21:18

## 2020-02-15 RX ADMIN — GUAIFENESIN 600 MG: 600 TABLET, EXTENDED RELEASE ORAL at 21:18

## 2020-02-15 RX ADMIN — METHYLPREDNISOLONE SODIUM SUCCINATE 60 MG: 125 INJECTION, POWDER, FOR SOLUTION INTRAMUSCULAR; INTRAVENOUS at 12:29

## 2020-02-15 RX ADMIN — PIPERACILLIN AND TAZOBACTAM 3.38 G: 3; .375 INJECTION, POWDER, LYOPHILIZED, FOR SOLUTION INTRAVENOUS at 21:18

## 2020-02-15 RX ADMIN — INSULIN LISPRO 2 UNITS: 100 INJECTION, SOLUTION INTRAVENOUS; SUBCUTANEOUS at 09:22

## 2020-02-15 RX ADMIN — METHYLPREDNISOLONE SODIUM SUCCINATE 60 MG: 125 INJECTION, POWDER, FOR SOLUTION INTRAMUSCULAR; INTRAVENOUS at 05:16

## 2020-02-15 RX ADMIN — SODIUM CHLORIDE 125 ML/HR: 900 INJECTION, SOLUTION INTRAVENOUS at 12:31

## 2020-02-15 RX ADMIN — HEPARIN SODIUM 5000 UNITS: 5000 INJECTION INTRAVENOUS; SUBCUTANEOUS at 19:30

## 2020-02-15 RX ADMIN — BUDESONIDE 500 MCG: 0.5 INHALANT RESPIRATORY (INHALATION) at 21:18

## 2020-02-15 RX ADMIN — GUAIFENESIN AND CODEINE PHOSPHATE 5 ML: 100; 10 SOLUTION ORAL at 22:46

## 2020-02-15 RX ADMIN — GUAIFENESIN AND CODEINE PHOSPHATE 5 ML: 100; 10 SOLUTION ORAL at 17:31

## 2020-02-15 RX ADMIN — DILTIAZEM HYDROCHLORIDE 180 MG: 180 CAPSULE, COATED, EXTENDED RELEASE ORAL at 09:21

## 2020-02-15 RX ADMIN — ARFORMOTEROL TARTRATE 15 MCG: 15 SOLUTION RESPIRATORY (INHALATION) at 07:53

## 2020-02-15 RX ADMIN — LEVETIRACETAM 500 MG: 500 TABLET ORAL at 21:18

## 2020-02-15 RX ADMIN — SODIUM CHLORIDE 125 ML/HR: 900 INJECTION, SOLUTION INTRAVENOUS at 04:02

## 2020-02-15 RX ADMIN — KETOROLAC TROMETHAMINE 15 MG: 30 INJECTION, SOLUTION INTRAMUSCULAR at 22:45

## 2020-02-15 RX ADMIN — METHYLPREDNISOLONE SODIUM SUCCINATE 60 MG: 125 INJECTION, POWDER, FOR SOLUTION INTRAMUSCULAR; INTRAVENOUS at 19:28

## 2020-02-15 RX ADMIN — SODIUM CHLORIDE 75 ML/HR: 900 INJECTION, SOLUTION INTRAVENOUS at 15:51

## 2020-02-15 RX ADMIN — ALBUTEROL SULFATE 2.5 MG: 2.5 SOLUTION RESPIRATORY (INHALATION) at 07:52

## 2020-02-15 RX ADMIN — ASPIRIN 81 MG: 81 TABLET, COATED ORAL at 09:21

## 2020-02-15 RX ADMIN — SODIUM CHLORIDE 250 ML: 9 INJECTION, SOLUTION INTRAVENOUS at 04:01

## 2020-02-15 RX ADMIN — KETOROLAC TROMETHAMINE 15 MG: 30 INJECTION, SOLUTION INTRAMUSCULAR at 15:09

## 2020-02-15 RX ADMIN — GUAIFENESIN 600 MG: 600 TABLET, EXTENDED RELEASE ORAL at 09:22

## 2020-02-15 RX ADMIN — ALBUTEROL SULFATE 2.5 MG: 2.5 SOLUTION RESPIRATORY (INHALATION) at 12:40

## 2020-02-15 RX ADMIN — PIPERACILLIN AND TAZOBACTAM 3.38 G: 3; .375 INJECTION, POWDER, LYOPHILIZED, FOR SOLUTION INTRAVENOUS at 15:09

## 2020-02-15 RX ADMIN — ARFORMOTEROL TARTRATE 15 MCG: 15 SOLUTION RESPIRATORY (INHALATION) at 21:18

## 2020-02-15 RX ADMIN — SODIUM CHLORIDE 1000 ML: 900 INJECTION, SOLUTION INTRAVENOUS at 15:52

## 2020-02-15 RX ADMIN — BENZONATATE 100 MG: 100 CAPSULE ORAL at 05:17

## 2020-02-15 RX ADMIN — LEVOFLOXACIN 500 MG: 5 INJECTION, SOLUTION INTRAVENOUS at 16:57

## 2020-02-15 RX ADMIN — ONDANSETRON 4 MG: 2 INJECTION INTRAMUSCULAR; INTRAVENOUS at 08:37

## 2020-02-15 RX ADMIN — LEVETIRACETAM 500 MG: 500 TABLET ORAL at 09:22

## 2020-02-15 RX ADMIN — ONDANSETRON 4 MG: 2 INJECTION INTRAMUSCULAR; INTRAVENOUS at 05:15

## 2020-02-15 RX ADMIN — MONTELUKAST 10 MG: 10 TABLET, FILM COATED ORAL at 09:21

## 2020-02-15 RX ADMIN — GUAIFENESIN AND CODEINE PHOSPHATE 5 ML: 100; 10 SOLUTION ORAL at 12:29

## 2020-02-15 NOTE — PROGRESS NOTES
Patient Name: Dulce Bravo   Age: 63 y.o. Sex:  female  YOB: 1960   Medical Record Number: [id number]      Antimicrobial  Vancomycin   Indication CAP   Dose / Interval           Current Antimicrobial Therapy (168h ago, onward)      Ordered     Start Stop    02/14/20 1843  levoFLOXacin (LEVAQUIN) 500 mg in D5W IVPB  500 mg,   IntraVENous,   EVERY 24 HOURS      02/15/20 1600 02/22/20 1559    02/15/20 0939  vancomycin (VANCOCIN) 1,250 mg in 0.9% sodium chloride 250 mL (VIAL-MATE)  1,250 mg,   IntraVENous,   EVERY 12 HOURS      02/15/20 1200 02/20/20 1159    02/14/20 1858  azithromycin (ZITHROMAX) 500 mg in 0.9% sodium chloride 250 mL (VIAL-MATE)  500 mg,   IntraVENous,   EVERY 24 HOURS      02/14/20 1858 02/19/20 1858               Last Level (if applicable) Lab Results   Component Value Date/Time    Reported dose: 1GM 11/04/2016 01:10 AM    Reported dose time: 1400 11/04/2016 01:10 AM    Reported dose date: 90413625 11/04/2016 01:10 AM     Vancomycin   Lab Results   Component Value Date/Time    Vancomycin,trough 16.9 11/04/2016 01:10 AM      Gentamicin   No results found for: GENP, GENT, GENR]  Tobramycin   No results found for: TOBP, TOBT, TOBR   Amikacin   No results found for: AMIKP, DAMIKP, AMIKT, DAMIKT, DAMIKR     Cultures (7 most recent)   Culture result:   Date Value Ref Range Status   02/14/2020 NO GROWTH AFTER 14 HOURS   Preliminary   02/14/2020 NO GROWTH AFTER 14 HOURS   Preliminary   10/31/2016 NO BETA HEMOLYTIC STREPTOCOCCUS GROUP A ISOLATED   Final   01/14/2016 >100,000  COLONIES/mL  ESCHERICHIA COLI     Final   06/21/2014 NO BETA HEMOLYTIC STREPTOCOCCUS GROUP A ISOLATED   Final      Renal Overview (72 hr)         Recent Labs     02/15/20  0155 02/14/20  1553   BUN 16 18   CREA 1.07 1.22       CrCl (Current): Estimated Creatinine Clearance: 55.1 mL/min (based on SCr of 1.07 mg/dL).       Lactic Acid Lab Results   Component Value Date/Time    Lactic acid 5.1 (HH) 02/15/2020 01:55 AM Lactic acid 3.1 () 2020 07:22 PM    Lactic acid 2.1 () 2020 03:53 PM      Procalcitonin (0.10-0.49 NG/ML) No results found for: PCT   Hepatic Overview (72 hr) Recent Labs     20  1553   ALT 18   SGOT 19   AP 85      Temp (24-hr Max) Temp (24hrs), Av.9 °F (36.6 °C), Min:97.6 °F (36.4 °C), Max:98.5 °F (36.9 °C)       Hematology Recent Labs     02/15/20  0155 20  1922 20  1553   WBC 5.1  --  3.9*   HGB 9.9*  --  12.1   HCT 30.4*  --  36.7     --  311   GRANS 92*  --  48   ANEU 4.6  --  1.9   LAC 5.1* 3.1* 2.1*        DOT  2     Notes Vancomycin dose has changed to Vancomycin 1250 mg IV q12h due to renal function improvement       Alyssa Tavares  Clinical Pharmacist  [phone]

## 2020-02-15 NOTE — PROGRESS NOTES
PATIENT INSTRUCTED IN USE OF AEROBIKA AS ORDERED. GOOD TECHNIQUE DEMONSTRATED. ANSWERED QUESTIONS RELATED TO CHANGE IN RESPIRATORY MEDICATIONS.

## 2020-02-15 NOTE — PROGRESS NOTES
Pharmacy Dosing Services: Vancomycin    Consult for Vancomycin Dosing by Pharmacy by Dr. Katiuska Mack provided for this 63y.o. year old female , for indication of CAP. Day of Therapy 1    Ht Readings from Last 1 Encounters:   02/14/20 157.5 cm (62\")        Wt Readings from Last 1 Encounters:   02/14/20 77.1 kg (170 lb)        Other Current Antibiotics Azithromycin 500mg IV   Levaquin 500mg IV   Significant Cultures pending   Serum Creatinine Lab Results   Component Value Date/Time    Creatinine 1.22 02/14/2020 03:53 PM      Creatinine Clearance Estimated Creatinine Clearance: 48.3 mL/min (based on SCr of 1.22 mg/dL). BUN Lab Results   Component Value Date/Time    BUN 18 02/14/2020 03:53 PM      WBC Lab Results   Component Value Date/Time    WBC 3.9 (L) 02/14/2020 03:53 PM      H/H Lab Results   Component Value Date/Time    HGB 12.1 02/14/2020 03:53 PM      Platelets Lab Results   Component Value Date/Time    PLATELET 596 03/30/4345 03:53 PM      Temp 97.7 °F (36.5 °C)     Start Vancomycin therapy, with loading dose of 1750mg for one dose  Follow with maintenance dose of 1500mg IV q24h    Dose calculated to approximate a therapeutic trough of 15-20mcg/mL. Pharmacy to follow daily and will make changes to dose and/or frequency based on clinical status.     Pharmacist Artemio Huggins [phone]

## 2020-02-15 NOTE — CONSULTS
Pulmonary and Sleep Medicine     Pulmonary Note    Patient: Jareth Ordoñez               Sex: female          DOA: 2/14/2020       YOB: 1960      Age:  63 y.o.        LOS:  LOS: 1 day        Reason for Consult: pneumonia, asthma exacerbation    IMPRESSION:   Patient Active Problem List   Diagnosis Code    Community acquired pneumonia of right lower lobe of lung ([facility] [address] J18.1    Asthma exacerbation J45. 0    Obstructive sleep apnea G47.33    Failure of outpatient treatment Z78.9    Anemia D64.9    HTN (hypertension) I10    Peptic ulcer disease     DM     Elevated lactate     Seizures disorder (HCC)       RECOMMENDATIONS:   Continue supplemental O2 via NC, titrate flow for goal SPO2> 91%  Sputum culture, respiratory vital panel, Procalcitonin ordered  UR legionella and streptococcus Ag; Influenza rapid negative  Periodic CT chest to follow up on clearing  Continue bronchodilators, pulmonary hygiene care  Steroids  Antibiotic choice: Vancomycin, Levofloxacin. Stop Azithromycin since patient failed it and Levofloxacin would cover atypical organism if any. Start Zosyn. Aspiration prevention bundle, head of the bed at 30' all times  Glycemic control  AM labs. Diet as tolerated  Will defer respective systems problem management to primary and other consultant and follow patient with primary and other team  Further recommendations will be based on the patient's response to recommended treatment and results of the investigation ordered. HPI:   Ms. Jareth Ordoñez is a 63 y.o. female who is seen at the request of Dr. Alicja Fuller for pneumonia and asthma exacerbation. Patient reports pmhx of asthma managed by PCP, DAPHNE on CPAP intermittently managed by Dr. Maggi Cool admitted with RLL pneumonia and asthma exacerbation after presented to ER with worsening cough for 3 weeks associated with fever, wheezing, SOB. Reports past 3 yrs getting pneumonia during this seasons. Had sick contacts through children in home.  She has been treated with Augmentin and then Azithromycin as outpatient in past 3 weeks by PCP without relief. In ER, Flu screen was negative; lactic acid was 2, WBC normal. Chest x-ray and CT chest indicated right-sided pneumonia. Started on iv abx, steroid. The patient denies sore throat, adenopathy, chest pain, hemoptysis, dysphagia, nausea, vomiting, bleeding, travel. Denies smoking/ any alcohol. Prior brief crack cocaine use quit 1 yr ago.      Review of Systems  General ROS: positive for - fever, chills, fatigue and malaise  Psychological ROS: negative for - anxiety or depression  Ophthalmic ROS: negative for - eye pain, loss of vision or photophobia  ENT ROS: negative for - epistaxis, headaches, sinus pain or vocal changes  Allergy and Immunology ROS: negative for - hives or postnasal drip  Hematological and Lymphatic ROS: negative for - bleeding problems, blood clots, jaundice, pallor or swollen lymph nodes  Endocrine ROS: negative for - skin changes, temperature intolerance or unexpected weight changes  Cardiovascular ROS: negative for - chest pain, edema, murmur, orthopnea, palpitations or paroxysmal nocturnal dyspnea  Gastrointestinal ROS: no abdominal pain, change in bowel habits, or black or bloody stools  Genito-Urinary ROS: no dysuria, trouble voiding, or hematuria  Musculoskeletal ROS: negative for - muscle pain or muscular weakness  Neurological ROS: no TIA or stroke symptoms  Dermatological ROS: negative for - pruritus, rash or skin lesion changes     Past Medical History  Past Medical History:   Diagnosis Date    Arthritis     Asthma     Chronic kidney disease     deformed right  kidney    Diabetes ([address]     Hypertension     Migraines     PUD (peptic ulcer disease)     H/O, no meds at this time 2016    Seizures ([address]     Unspecified sleep apnea     uses cpap       Past Surgical History  Past Surgical History:   Procedure Laterality Date    HX APPENDECTOMY      HX GYN      hysterectomy Family History  Family History   Problem Relation Age of Onset    Malignant Hyperthermia Neg Hx     Pseudocholinesterase Deficiency Neg Hx     Delayed Awakening Neg Hx     Post-op Nausea/Vomiting Neg Hx     Emergence Delirium Neg Hx     Post-op Cognitive Dysfunction Neg Hx     Other Neg Hx        Social History  Social History     Tobacco Use    Smoking status: Never Smoker    Smokeless tobacco: Never Used   Substance Use Topics    Alcohol use: No    Drug use: No        Medications  Current Facility-Administered Medications   Medication Dose Route Frequency    Vancomycin-Pharmacy to dose  1 Each Other Rx Dosing/Monitoring    sodium chloride 0.9 % bolus infusion 1,000 mL  1,000 mL IntraVENous ONCE    vancomycin (VANCOCIN) 1,250 mg in 0.9% sodium chloride 250 mL (VIAL-MATE)  1,250 mg IntraVENous Q12H    0.9% sodium chloride infusion  75 mL/hr IntraVENous CONTINUOUS    albuterol (PROVENTIL VENTOLIN) nebulizer solution 2.5 mg  2.5 mg Nebulization Q4HWA RT    benzonatate (TESSALON) capsule 100 mg  100 mg Oral TID PRN    ketorolac (TORADOL) injection 15 mg  15 mg IntraVENous Q6H PRN    naloxone (NARCAN) injection 0.4 mg  0.4 mg IntraVENous PRN    diphenhydrAMINE (BENADRYL) injection 12.5 mg  12.5 mg IntraVENous Q4H PRN    ondansetron (ZOFRAN) injection 4 mg  4 mg IntraVENous Q4H PRN    heparin (porcine) injection 5,000 Units  5,000 Units SubCUTAneous Q8H    guaiFENesin ER (MUCINEX) tablet 600 mg  600 mg Oral Q12H    arformoteroL (BROVANA) neb solution 15 mcg  15 mcg Nebulization BID RT    budesonide (PULMICORT) 500 mcg/2 ml nebulizer suspension  500 mcg Nebulization BID RT    methylPREDNISolone (PF) (Solu-MEDROL) injection 60 mg  60 mg IntraVENous Q6H    levoFLOXacin (LEVAQUIN) 500 mg in D5W IVPB  500 mg IntraVENous Q24H    aspirin delayed-release tablet 81 mg  81 mg Oral DAILY    guaiFENesin-codeine (ROBITUSSIN AC) 100-10 mg/5 mL solution 5 mL  5 mL Oral TID PRN    levETIRAcetam (KEPPRA) tablet 500 mg  500 mg Oral BID    montelukast (SINGULAIR) tablet 10 mg  10 mg Oral DAILY    dilTIAZem CD (CARDIZEM CD) capsule 180 mg  180 mg Oral DAILY    azithromycin (ZITHROMAX) 500 mg in 0.9% sodium chloride 250 mL (VIAL-MATE)  500 mg IntraVENous Q24H    Saccharomyces boulardii (FLORASTOR) capsule 500 mg  500 mg Oral BID    insulin lispro (HUMALOG) injection   SubCUTAneous AC&HS    glucose chewable tablet 16 g  4 Tab Oral PRN    glucagon (GLUCAGEN) injection 1 mg  1 mg IntraMUSCular PRN     Prior to Admission medications    Medication Sig Start Date End Date Taking? Authorizing Provider   aspirin delayed-release 81 mg tablet Take 81 mg by mouth daily. Yes Provider, Historical   CASCARA SAGRADA PO Take  by mouth. Other, MD Kirk   dilTIAZem XR (DILACOR XR) 180 mg XR capsule Take  by mouth daily. Other, MD Kirk   albuterol (PROVENTIL HFA, VENTOLIN HFA, PROAIR HFA) 90 mcg/actuation inhaler Take 2 Puffs by inhalation every four (4) hours as needed for Wheezing or Shortness of Breath. 1/9/19   Mara Blanton PA-C   inhalational spacing device 1 Each by Does Not Apply route as needed. Please dispense one adult spacer to be used with albuterol HFA. 1/9/19   Mara Blanton PA-C   predniSONE (STERAPRED DS) 10 mg dose pack Take with food. 1/9/19   Billy Ramires PA-C   albuterol (ACCUNEB) 1.25 mg/3 mL nebu Take 3 mL by inhalation every four (4) hours as needed (wheezing). 1/9/19   Billy Ramires PA-C   ondansetron (ZOFRAN ODT) 4 mg disintegrating tablet Take 1 Tab by mouth every eight (8) hours as needed for Nausea. 1/9/19   Alexandrea Ramires PA-C   guaiFENesin-codeine (ROBITUSSIN AC) 100-10 mg/5 mL solution Take 5 mL by mouth three (3) times daily as needed for Cough. Max Daily Amount: 15 mL. 1/9/19   Billy Ramires PA-C   clonazePAM (KLONOPIN) 0.5 mg tablet Take 1 Tab by mouth three (3) times daily.  Max Daily Amount: 1.5 mg. 3/26/17   Elena Sarabia MD   dronabinol (MARINOL) 2.5 mg capsule Take 1 Cap by mouth Before breakfast and dinner. Max Daily Amount: 5 mg. 3/26/17   Royer Gee MD   montelukast (SINGULAIR) 10 mg tablet Take 10 mg by mouth daily. Provider, Historical   naproxen (NAPROSYN) 500 mg tablet Take 500 mg by mouth two (2) times daily as needed. Provider, Historical   raNITIdine (ZANTAC) 150 mg tablet Take 150 mg by mouth two (2) times a day. Saqib Oneal MD   conjugated estrogens (PREMARIN) 0.9 mg tablet Take 0.9 mg by mouth daily. Saqib Oneal MD   acetaminophen (TYLENOL) 500 mg tablet Take 1,000 mg by mouth every six (6) hours as needed for Pain or Fever. Saqib Oneal MD   fluticasone-salmeterol (ADVAIR) 250-50 mcg/dose diskus inhaler Take 1 Puff by inhalation two (2) times a day. 16   Hailey Sales MD   escitalopram oxalate (LEXAPRO) 10 mg tablet Take 15 mg by mouth daily. Provider, Historical   levETIRAcetam (KEPPRA) 500 mg tablet Take 500 mg by mouth two (2) times a day. Kirk Ryan MD   SUMAtriptan succinate 6 mg/0.5 mL kit 6 mg by SubCUTAneous route once as needed for Migraine. Kirk Ryan MD   losartan-hydrochlorothiazide (HYZAAR) 100-25 mg per tablet Take 1 Tab by mouth daily. Kirk Ryan MD   OXcarbazepine (TRILEPTAL) 300 mg tablet Take 300 mg by mouth two (2) times a day. Kirk Ryan MD       Allergy  Allergies   Allergen Reactions    Morphine Itching     Can tolerate if premed w/ benadryl    Pertussis Vaccine,Adsorbed Swelling     Swelling at injection site    Vicodin [Hydrocodone-Acetaminophen] Itching     Can tolerate if premed w/ benadryl       Physical Exam:   Vital Signs:    Blood pressure 115/49, pulse 72, temperature 98.4 °F (36.9 °C), resp. rate 16, height 5' 2\" (1.575 m), weight 77.1 kg (170 lb), SpO2 96 %, not currently breastfeeding. Body mass index is 31.09 kg/m².     O2 Device: Room air       Temp (24hrs), Av.1 °F (36.7 °C), Min:97.6 °F (36.4 °C), Max:98.5 °F (36.9 °C) Intake/Output:   Last shift:      02/15 0701 - 02/15 1900  In: -   Out: 200 [Urine:200]  Last 3 shifts: 02/13 1901 - 02/15 0700  In: 2150 [I.V.:2150]  Out: 1     Intake/Output Summary (Last 24 hours) at 2/15/2020 1338  Last data filed at 2/15/2020 0354  Gross per 24 hour   Intake 2150 ml   Output 201 ml   Net 1949 ml      General: in no respiratory distress and acyanotic, alert and oriented times 3, cooperative, appears stated age, on RA  HEENT: PERRLA, throat normal without erythema or exudate  Neck: No abnormally enlarged lymph nodes or thyroid, supple  Chest: normal, obese chest wall  Lungs: moderate air entry, rhonchi RT base and scattered wheezes bilaterally, normal percussion anterior chestwall bilaterally, no tenderness/ rash  Heart: Regular rate and rhythm, S1S2 present or without murmur or extra heart sounds  Abdomen: obese, bowel sounds normoactive, tympanic, abdomen is soft without significant tenderness, masses, organomegaly or guarding  Extremity: negative for edema, cyanosis, clubbing  Neuro: alert, oriented x 3, no defects noted in general exam.  Skin: Skin color, texture, turgor normal. No rashes or lesions    Labs Reviewed:  Recent Results (from the past 24 hour(s))   CBC WITH AUTOMATED DIFF    Collection Time: 02/14/20  3:53 PM   Result Value Ref Range    WBC 3.9 (L) 4.6 - 13.2 K/uL    RBC 4.02 (L) 4.20 - 5.30 M/uL    HGB 12.1 12.0 - 16.0 g/dL    HCT 36.7 35.0 - 45.0 %    MCV 91.3 74.0 - 97.0 FL    MCH 30.1 24.0 - 34.0 PG    MCHC 33.0 31.0 - 37.0 g/dL    RDW 12.2 11.6 - 14.5 %    PLATELET 209 073 - 790 K/uL    MPV 10.5 9.2 - 11.8 FL    NEUTROPHILS 48 40 - 73 %    LYMPHOCYTES 30 21 - 52 %    MONOCYTES 14 (H) 3 - 10 %    EOSINOPHILS 8 (H) 0 - 5 %    BASOPHILS 0 0 - 2 %    ABS. NEUTROPHILS 1.9 1.8 - 8.0 K/UL    ABS. LYMPHOCYTES 1.2 0.9 - 3.6 K/UL    ABS. MONOCYTES 0.5 0.05 - 1.2 K/UL    ABS. EOSINOPHILS 0.3 0.0 - 0.4 K/UL    ABS.  BASOPHILS 0.0 0.0 - 0.1 K/UL    DF AUTOMATED     METABOLIC PANEL, COMPREHENSIVE    Collection Time: 02/14/20  3:53 PM   Result Value Ref Range    Sodium 137 136 - 145 mmol/L    Potassium 3.7 3.5 - 5.5 mmol/L    Chloride 101 100 - 111 mmol/L    CO2 31 21 - 32 mmol/L    Anion gap 5 3.0 - 18 mmol/L    Glucose 99 74 - 99 mg/dL    BUN 18 7.0 - 18 MG/DL    Creatinine 1.22 0.6 - 1.3 MG/DL    BUN/Creatinine ratio 15 12 - 20      GFR est AA 55 (L) >60 ml/min/1.73m2    GFR est non-AA 45 (L) >60 ml/min/1.73m2    Calcium 8.7 8.5 - 10.1 MG/DL    Bilirubin, total 0.2 0.2 - 1.0 MG/DL    ALT (SGPT) 18 13 - 56 U/L    AST (SGOT) 19 10 - 38 U/L    Alk.  phosphatase 85 45 - 117 U/L    Protein, total 7.3 6.4 - 8.2 g/dL    Albumin 3.7 3.4 - 5.0 g/dL    Globulin 3.6 2.0 - 4.0 g/dL    A-G Ratio 1.0 0.8 - 1.7     CULTURE, BLOOD    Collection Time: 02/14/20  3:53 PM   Result Value Ref Range    Special Requests: NO SPECIAL REQUESTS      Culture result: NO GROWTH AFTER 14 HOURS     LACTIC ACID    Collection Time: 02/14/20  3:53 PM   Result Value Ref Range    Lactic acid 2.1 (HH) 0.4 - 2.0 MMOL/L   HEMOGLOBIN A1C WITH EAG    Collection Time: 02/14/20  3:53 PM   Result Value Ref Range    Hemoglobin A1c 5.7 (H) 4.2 - 5.6 %    Est. average glucose 117 mg/dL   INFLUENZA A & B AG (RAPID TEST)    Collection Time: 02/14/20  4:07 PM   Result Value Ref Range    Influenza A Antigen NEGATIVE  NEG      Influenza B Antigen NEGATIVE  NEG     CULTURE, BLOOD    Collection Time: 02/14/20  4:12 PM   Result Value Ref Range    Special Requests: NO SPECIAL REQUESTS      Culture result: NO GROWTH AFTER 14 HOURS     LACTIC ACID    Collection Time: 02/14/20  7:22 PM   Result Value Ref Range    Lactic acid 3.1 (HH) 0.4 - 2.0 MMOL/L   GLUCOSE, POC    Collection Time: 02/14/20  8:45 PM   Result Value Ref Range    Glucose (POC) 158 (H) 70 - 811 mg/dL   METABOLIC PANEL, BASIC    Collection Time: 02/15/20  1:55 AM   Result Value Ref Range    Sodium 141 136 - 145 mmol/L    Potassium 4.1 3.5 - 5.5 mmol/L    Chloride 109 100 - 111 mmol/L CO2 26 21 - 32 mmol/L    Anion gap 6 3.0 - 18 mmol/L    Glucose 150 (H) 74 - 99 mg/dL    BUN 16 7.0 - 18 MG/DL    Creatinine 1.07 0.6 - 1.3 MG/DL    BUN/Creatinine ratio 15 12 - 20      GFR est AA >60 >60 ml/min/1.73m2    GFR est non-AA 53 (L) >60 ml/min/1.73m2    Calcium 7.5 (L) 8.5 - 10.1 MG/DL   MAGNESIUM    Collection Time: 02/15/20  1:55 AM   Result Value Ref Range    Magnesium 2.2 1.6 - 2.6 mg/dL   CBC WITH AUTOMATED DIFF    Collection Time: 02/15/20  1:55 AM   Result Value Ref Range    WBC 5.1 4.6 - 13.2 K/uL    RBC 3.26 (L) 4.20 - 5.30 M/uL    HGB 9.9 (L) 12.0 - 16.0 g/dL    HCT 30.4 (L) 35.0 - 45.0 %    MCV 93.3 74.0 - 97.0 FL    MCH 30.4 24.0 - 34.0 PG    MCHC 32.6 31.0 - 37.0 g/dL    RDW 12.4 11.6 - 14.5 %    PLATELET 050 894 - 542 K/uL    MPV 10.1 9.2 - 11.8 FL    NEUTROPHILS 92 (H) 40 - 73 %    LYMPHOCYTES 7 (L) 21 - 52 %    MONOCYTES 1 (L) 3 - 10 %    EOSINOPHILS 0 0 - 5 %    BASOPHILS 0 0 - 2 %    ABS. NEUTROPHILS 4.6 1.8 - 8.0 K/UL    ABS. LYMPHOCYTES 0.4 (L) 0.9 - 3.6 K/UL    ABS. MONOCYTES 0.1 0.05 - 1.2 K/UL    ABS. EOSINOPHILS 0.0 0.0 - 0.4 K/UL    ABS.  BASOPHILS 0.0 0.0 - 0.1 K/UL    RBC COMMENTS NORMOCYTIC, NORMOCHROMIC      DF AUTOMATED     LACTIC ACID    Collection Time: 02/15/20  1:55 AM   Result Value Ref Range    Lactic acid 5.1 (HH) 0.4 - 2.0 MMOL/L   URINALYSIS W/ RFLX MICROSCOPIC    Collection Time: 02/15/20  4:45 AM   Result Value Ref Range    Color YELLOW      Appearance CLEAR      Specific gravity 1.018 1.005 - 1.030      pH (UA) 5.5 5.0 - 8.0      Protein NEGATIVE  NEG mg/dL    Glucose NEGATIVE  NEG mg/dL    Ketone NEGATIVE  NEG mg/dL    Bilirubin NEGATIVE  NEG      Blood NEGATIVE  NEG      Urobilinogen 0.2 0.2 - 1.0 EU/dL    Nitrites NEGATIVE  NEG      Leukocyte Esterase NEGATIVE  NEG     STREP PNEUMO AG, URINE    Collection Time: 02/15/20  4:45 AM   Result Value Ref Range    Strep pneumo Ag, urine NEGATIVE  NEG     LEGIONELLA PNEUMOPHILA AG, URINE    Collection Time: 02/15/20 4:45 AM   Result Value Ref Range    Legionella Ag, urine NEGATIVE  NEG     GLUCOSE, POC    Collection Time: 02/15/20  7:33 AM   Result Value Ref Range    Glucose (POC) 171 (H) 70 - 110 mg/dL   LACTIC ACID    Collection Time: 02/15/20  9:13 AM   Result Value Ref Range    Lactic acid 1.8 0.4 - 2.0 MMOL/L   POC VENOUS BLOOD GAS    Collection Time: 02/15/20  9:21 AM   Result Value Ref Range    Device: ROOM AIR      FIO2 (POC) 21 %    pH, venous (POC) 7.360 7.32 - 7.42      pCO2, venous (POC) 43.0 41 - 51 MMHG    pO2, venous (POC) 30 25 - 40 mmHg    HCO3, venous (POC) 24.3 23.0 - 28.0 MMOL/L    sO2, venous (POC) 54 (L) 65 - 88 %    Base deficit, venous (POC) 1 mmol/L    Allens test (POC) YES      Total resp.  rate 20      Site OTHER      Specimen type (POC) VENOUS BLOOD      Performed by Jomar Neri    GLUCOSE, POC    Collection Time: 02/15/20 11:31 AM   Result Value Ref Range    Glucose (POC) 138 (H) 70 - 110 mg/dL           Recent Labs     02/15/20  0921   FIO2I 21       All Micro Results     Procedure Component Value Units Date/Time    LEGIONELLA PNEUMOPHILA AG, URINE [[id number]] Collected:  02/15/20 0445    Order Status:  Completed Specimen:  Urine, random Updated:  02/15/20 1221     Legionella Ag, urine NEGATIVE        STREP PNEUMO AG, URINE [[id number]] Collected:  02/15/20 0445    Order Status:  Completed Specimen:  Urine, random Updated:  02/15/20 1221     Strep pneumo Ag, urine NEGATIVE        CULTURE, BLOOD [[id number]] Collected:  02/14/20 1553    Order Status:  Completed Specimen:  Blood Updated:  02/15/20 0624     Special Requests: NO SPECIAL REQUESTS        Culture result: NO GROWTH AFTER 14 HOURS       CULTURE, BLOOD [[id number]] Collected:  02/14/20 1612    Order Status:  Completed Specimen:  Blood Updated:  02/15/20 0624     Special Requests: NO SPECIAL REQUESTS        Culture result: NO GROWTH AFTER 14 HOURS       CULTURE, RESPIRATORY/SPUTUM/BRONCH Yossi Freud STAIN [[id number]]     Order Status:  Sent Specimen:  Sputum     INFLUENZA A & B AG (RAPID TEST) [[id number]] Collected:  02/14/20 1607    Order Status:  Completed Specimen:  Nasopharyngeal from Nasal washing Updated:  02/14/20 1628     Influenza A Antigen NEGATIVE         Comment: A negative result does not exclude influenza virus infection, seasonal or H1N1 due to suboptimal sensitivity. If influenza is circulating in your community, a diagnosis of influenza should be considered based on a patients clinical presentation and empiric antiviral treatment should be considered, if indicated. Influenza B Antigen NEGATIVE              Imaging:  [x]I have personally reviewed the patients chest radiographs images and report with the patient  Results from Hospital Encounter encounter on 02/14/20   XR CHEST PA LAT    Narrative EXAM: XR CHEST PA LAT    CLINICAL INDICATION/HISTORY: cough  -Additional: None    COMPARISON: Several prior exams, most recently 1/9/2019    TECHNIQUE: PA and lateral views of the chest    _______________    FINDINGS:    HEART AND MEDIASTINUM: Cardiac size and mediastinal contours are within normal  limits    LUNGS AND PLEURAL SPACES: Lungs are mildly underexpanded. Asymmetric area of  opacity at the medial right lung base. No pneumothorax or pleural effusion. BONY THORAX AND SOFT TISSUES: No acute osseous abnormality    _______________      Impression IMPRESSION:    Mildly underexpanded lungs with likely infiltrate in the medial right lung base. No accompanying pleural effusion. Recommend radiographic follow-up to document  expected clinical resolution in 4-6 weeks' time. Results from [place] encounter on 02/14/20   CT CHEST WO CONT    Narrative EXAM: CT Chest     INDICATION: Patient currently admitted for bronchitis, not improving despite  several weeks of antibiotics. .    COMPARISON: Abdominal/pelvic CT 1/14/2016; prior chest radiographs 2/14/2020. Luis Alvarado     TECHNIQUE: Axial CT imaging from the thoracic inlet through the diaphragm  without intravenous contrast. Multiplanar reformations were generated. One or more dose reduction techniques were used on this CT: automated exposure  control, adjustment of the mAs and/or kVp according to patient size, and  iterative reconstruction techniques. The specific techniques used on this CT  exam have been documented in the patient's electronic medical record. Digital  Imaging and Communications in Medicine (DICOM) format image data are available  to nonaffiliated external healthcare facilities or entities on a secure, media  free, reciprocally searchable basis with patient authorization for at least a  12-month period after this study. _______________    FINDINGS:    LUNGS: Alveolar consolidation and groundglass density present within the medial  right lower lobe with additional faint centrilobular nodules present within the  periphery of the right lower lobe. The remaining right lung is clear. Minor  basilar changes of atelectasis at the dependent left lower lobe. PLEURA: No pneumothorax or pleural effusion. AIRWAY: Unremarkable. MEDIASTINUM: Included thyroid gland is unremarkable. Normal cardiac size. No  pericardial effusion. Thoracic aorta is of normal course and caliber. LYMPH NODES: Enlarged right supraclavicular lymph node (series 2, image 9)  measures approximately 1.5 cm in short axis diameter. Left supraclavicular alex  basin appears within normal limits. No axillary or mediastinal adenopathy. No  enlarged hilar lymph nodes. UPPER ABDOMEN: Included upper abdomen demonstrates no acute abnormality. Small  hiatal hernia. OSSEOUS STRUCTURES: No acute osseous abnormality. No suspicious or blastic  osseous lesions. OTHER:    _______________      Impression IMPRESSION:      1. Right lower lobe pneumonia without evidence of accompanying pleural effusion.   As previously, recommend radiographic follow-up to document expected clinical  resolution in 4-6 weeks' time following appropriate antibiotic treatment. 2. Indeterminate right sternoclavicular lymph nodes noted enlargement. Unclear  if this is a reactive phenomenon versus manifestation of separate disease  process. Clinical follow-up recommended. If lymph node enlargement persists,  consider dedicated imaging and/or tissue sampling. [x]See my orders for details    My assessment, plan of care, findings, medications, side effects etc were discussed with:  [x]nursing []PT/OT    [x]respiratory therapy []Dr. Cecilia Dockery [x]Patient     [x]Total care time exclusive of procedures 60 minutes with complex decision making performed and > 50% time spent in face to face consultation.     aVlentina Green MD

## 2020-02-15 NOTE — PROGRESS NOTES
4923  verbal  report given to Eula Crystal RN by Jasmine Israel RN from Emergency Department . Report included the following information SBAR, Kardex, OR Summary, Intake/Output and MAR.    1900 Pt still in ED    1919  verbal shift change report given by Eula Crystal RN (off going nurse) to Alva Hayes RN(on coming nurse). Report included the following information SBAR, Kardex, OR Summary, Intake/Output and MAR.

## 2020-02-15 NOTE — PROGRESS NOTES
4697  Bedside and Verbal shift change report given to [address] (oncoming nurse) by Ana Ngo RN (offgoing nurse). Report included the following information SBAR, Kardex, Intake/Output, MAR and Recent Results. 2015  Bedside and Verbal shift change report given to [facility] (oncoming nurse) by Beatrice Elizabeth RN (offgoing nurse). Report included the following information SBAR, Kardex, Intake/Output, MAR and Recent Results.

## 2020-02-15 NOTE — PROGRESS NOTES
Hospitalist Progress Note-critical care note     Patient: Ilya Kelly MRN: [id number]  CSN: [id number]    YOB: 1960  Age: 63 y.o. Sex: female    DOA: 2/14/2020 LOS:  LOS: 1 day            Chief complaint: asthma cap . Assessment/Plan         Hospital Problems  Never Reviewed          Codes Class Noted POA    Failure of outpatient treatment ICD-10-CM: Z78.9  ICD-9-CM: V49.89  2/14/2020 Yes        Moderate persistent asthma with acute exacerbation ICD-10-CM: J45.41  ICD-9-CM: 493.92  2/14/2020 Yes        CAP (community acquired pneumonia) ICD-10-CM: J18.9  ICD-9-CM: 467  2/14/2020 Unknown        Seizures ([facility] ICD-10-CM: R56.9  ICD-9-CM: 780.39  2/14/2020 Unknown        HTN (hypertension) ICD-10-CM: I10  ICD-9-CM: 401.9  11/1/2016 Yes        * (Principal) Community acquired pneumonia of right lower lobe of lung ([facility] ICD-10-CM: J18.1  ICD-9-CM: 099  11/1/2016 Unknown            Asthma exacerbation ,moderate persistent -stable -reported same    Iv steroid, breathing treatment with bronchodilator   symptom treatment   ssi for glucose control   Hx of intubated-will have Dr. Patsy Mayers on board       Cap-failed out -pt treatment ,  Pneumonia   continue levaquin and vanc/azithromycin   - legionella antigen urine/strep pneumonia urine negative    influenza rapid screen negative    respiratory culture with gram stain-not sent   -ct chest rt side pna   -avg is ok     Elevated lactic acidosis  Do not think due to sepsis,  Resolved per iv hydration  Will decrease ns infusion to prevent fluid overloaded       HTN, accelerated  Continue home medication.      seizure continue keppra   Seizure precaution     Subjective: sob same, still coughs   rn : no acute issue   Disposition :tbd,   Review of systems:    General: No fevers or chills. Cardiovascular: No chest pain or pressure. No palpitations. Pulmonary: coughs ,  shortness of breath. Gastrointestinal: No nausea, vomiting.      Vital signs/Intake and Output:  Visit Vitals  /49   Pulse 72   Temp 98.4 °F (36.9 °C)   Resp 16   Ht 5' 2\" (1.575 m)   Wt 77.1 kg (170 lb)   SpO2 96%   Breastfeeding No   BMI 31.09 kg/m²     Current Shift:  02/15 0701 - 02/15 1900  In: -   Out: 200 [Urine:200]  Last three shifts:  02/13 1901 - 02/15 0700  In: 2150 [I.V.:2150]  Out: 1     Physical Exam:  General: WD, WN. Alert, cooperative, no acute distress    HEENT: NC, Atraumatic. PERRLA, anicteric sclerae. Lungs: Mild  Wheezing-improving , +Rhonchi/Rales. Heart:  Regular  rhythm,  No murmur, No Rubs, No Gallops  Abdomen: Soft, Non distended, Non tender.  +Bowel sounds,   Extremities: No c/c/e  Psych:   Not anxious or agitated. Neurologic:  No acute neurological deficit. Labs: Results:       Chemistry Recent Labs     02/15/20  0155 02/14/20  1553   * 99    137   K 4.1 3.7    101   CO2 26 31   BUN 16 18   CREA 1.07 1.22   CA 7.5* 8.7   AGAP 6 5   BUCR 15 15   AP  --  85   TP  --  7.3   ALB  --  3.7   GLOB  --  3.6   AGRAT  --  1.0      CBC w/Diff Recent Labs     02/15/20  0155 02/14/20  1553   WBC 5.1 3.9*   RBC 3.26* 4.02*   HGB 9.9* 12.1   HCT 30.4* 36.7    311   GRANS 92* 48   LYMPH 7* 30   EOS 0 8*      Cardiac Enzymes No results for input(s): CPK, CKND1, YAMILKA in the last 72 hours. No lab exists for component: CKRMB, TROIP   Coagulation No results for input(s): PTP, INR, APTT, INREXT in the last 72 hours. Lipid Panel No results found for: CHOL, CHOLPOCT, CHOLX, CHLST, CHOLV, 395266, HDL, HDLP, LDL, LDLC, DLDLP, 380898, VLDLC, VLDL, TGLX, TRIGL, TRIGP, TGLPOCT, CHHD, CHHDX   BNP No results for input(s): BNPP in the last 72 hours.    Liver Enzymes Recent Labs     02/14/20  1553   TP 7.3   ALB 3.7   AP 85   SGOT 19      Thyroid Studies No results found for: T4, T3U, TSH, TSHEXT     Procedures/imaging: see electronic medical records for all procedures/Xrays and details which were not copied into this note but were reviewed prior to creation of Ruthann Rm MD

## 2020-02-15 NOTE — PROGRESS NOTES
Pt arrived to the floor in stable condition. AAO4. Pt voiced generalized fatigue. Bed alarm on. All safety and comfort measures in place. Pt voiced loose stool with formed pieces after home antibiotics. Will continue to monitor. Floraster order on MAR. Nonproductive cough noted. Lung sounds clear. Pending Neb treatments and cough medication as per MD order. IVF infusing. Pending Vanco delivery to floor. 2030: Critical Lactic Lab result called to MD. Orders given. Pending Blood culture results and lactic redraw. 2337: Vanco given. Pt tolerated well. 0350: Critical Lactic Lab result. MD aware. Orders given. PT asymptomatic. Resting in bed. No s/s of distress noted or voiced. Pending Lactic repeat lab at 0900.    0745: Bedside shift change report given to Laney Hughes (oncoming nurse) by Dasha Alexander RN (offgoing nurse). Report included the following information SBAR, Kardex, Procedure Summary, MAR and Recent Results.

## 2020-02-15 NOTE — ROUTINE PROCESS
TRANSFER - OUT REPORT: 
 
Verbal report given to [facility] RN(name) on Joss James  being transferred to Med/surg(unit) for routine progression of care Report consisted of patients Situation, Background, Assessment and  
Recommendations(SBAR). Information from the following report(s) SBAR, MAR and Recent Results was reviewed with the receiving nurse. Lines:  
Peripheral IV 02/14/20 Right Antecubital (Active) Site Assessment Clean, dry, & intact 2/14/2020  3:58 PM  
Phlebitis Assessment 0 2/14/2020  3:58 PM  
Infiltration Assessment 0 2/14/2020  3:58 PM  
Dressing Status Clean, dry, & intact 2/14/2020  3:58 PM  
Dressing Type Transparent 2/14/2020  3:58 PM  
Hub Color/Line Status Pink 2/14/2020  3:58 PM  
Action Taken Blood drawn 2/14/2020  3:58 PM  
Alcohol Cap Used No 2/14/2020  3:58 PM  
  
 
Opportunity for questions and clarification was provided. Patient transported with: 
 Tech  
 
Skin warm and dry. Respirations unlabored.

## 2020-02-15 NOTE — PROGRESS NOTES
NOTED THAT PATIENT HAS HX OF DAPHNE AND USES HOME CPAP. PATIENT STATES THAT SHE RARELY USES HER CPAP. DR. Stephani Homans AWARE.

## 2020-02-15 NOTE — PROGRESS NOTES
Cm on call. Met with pt at bedside. States is IADLs, uses no DME. Lives with . Uses a CPAP at home. No d/c needs identified at this time    Care Management Interventions  Current Support Network: Lives with Spouse  Confirm Follow Up Transport: Family  Discharge Location  Discharge Placement: Home with family assistance    Reason for Admission:   Community acquired pneumonia of right lower lobe on lung                  RUR Score:  12                Do you (patient/family) have any concerns for transition/discharge?                    Plan for utilizing home health:   Not at this time    Current Advanced Directive/Advance Care Plan:              Transition of Care Plan:   Family assistance, provider follow up

## 2020-02-16 LAB
ANION GAP SERPL CALC-SCNC: 5 MMOL/L (ref 3–18)
BASOPHILS # BLD: 0 K/UL (ref 0–0.1)
BASOPHILS NFR BLD: 0 % (ref 0–2)
BUN SERPL-MCNC: 16 MG/DL (ref 7–18)
BUN/CREAT SERPL: 16 (ref 12–20)
CALCIUM SERPL-MCNC: 7.6 MG/DL (ref 8.5–10.1)
CHLORIDE SERPL-SCNC: 111 MMOL/L (ref 100–111)
CO2 SERPL-SCNC: 27 MMOL/L (ref 21–32)
CREAT SERPL-MCNC: 1.01 MG/DL (ref 0.6–1.3)
DATE LAST DOSE: ABNORMAL
DIFFERENTIAL METHOD BLD: ABNORMAL
EOSINOPHIL # BLD: 0 K/UL (ref 0–0.4)
EOSINOPHIL NFR BLD: 0 % (ref 0–5)
ERYTHROCYTE [DISTWIDTH] IN BLOOD BY AUTOMATED COUNT: 12.8 % (ref 11.6–14.5)
GLUCOSE BLD STRIP.AUTO-MCNC: 125 MG/DL (ref 70–110)
GLUCOSE BLD STRIP.AUTO-MCNC: 147 MG/DL (ref 70–110)
GLUCOSE BLD STRIP.AUTO-MCNC: 163 MG/DL (ref 70–110)
GLUCOSE SERPL-MCNC: 135 MG/DL (ref 74–99)
HCT VFR BLD AUTO: 32.3 % (ref 35–45)
HGB BLD-MCNC: 10.6 G/DL (ref 12–16)
LYMPHOCYTES # BLD: 0.6 K/UL (ref 0.9–3.6)
LYMPHOCYTES NFR BLD: 4 % (ref 21–52)
MAGNESIUM SERPL-MCNC: 2.1 MG/DL (ref 1.6–2.6)
MCH RBC QN AUTO: 30.5 PG (ref 24–34)
MCHC RBC AUTO-ENTMCNC: 32.8 G/DL (ref 31–37)
MCV RBC AUTO: 92.8 FL (ref 74–97)
MONOCYTES # BLD: 0.4 K/UL (ref 0.05–1.2)
MONOCYTES NFR BLD: 2 % (ref 3–10)
NEUTS SEG # BLD: 14.6 K/UL (ref 1.8–8)
NEUTS SEG NFR BLD: 94 % (ref 40–73)
PLATELET # BLD AUTO: 308 K/UL (ref 135–420)
PMV BLD AUTO: 10.5 FL (ref 9.2–11.8)
POTASSIUM SERPL-SCNC: 4 MMOL/L (ref 3.5–5.5)
RBC # BLD AUTO: 3.48 M/UL (ref 4.2–5.3)
REPORTED DOSE,DOSE: 1250 UNITS
REPORTED DOSE/TIME,TMG: 44
SODIUM SERPL-SCNC: 143 MMOL/L (ref 136–145)
VANCOMYCIN TROUGH SERPL-MCNC: 20.4 UG/ML (ref 10–20)
WBC # BLD AUTO: 15.6 K/UL (ref 4.6–13.2)

## 2020-02-16 PROCEDURE — 80048 BASIC METABOLIC PNL TOTAL CA: CPT

## 2020-02-16 PROCEDURE — 80202 ASSAY OF VANCOMYCIN: CPT

## 2020-02-16 PROCEDURE — 74011250637 HC RX REV CODE- 250/637: Performed by: HOSPITALIST

## 2020-02-16 PROCEDURE — 65270000029 HC RM PRIVATE

## 2020-02-16 PROCEDURE — 83735 ASSAY OF MAGNESIUM: CPT

## 2020-02-16 PROCEDURE — 74011000258 HC RX REV CODE- 258: Performed by: INTERNAL MEDICINE

## 2020-02-16 PROCEDURE — 74011250636 HC RX REV CODE- 250/636: Performed by: HOSPITALIST

## 2020-02-16 PROCEDURE — 36415 COLL VENOUS BLD VENIPUNCTURE: CPT

## 2020-02-16 PROCEDURE — 85025 COMPLETE CBC W/AUTO DIFF WBC: CPT

## 2020-02-16 PROCEDURE — 82962 GLUCOSE BLOOD TEST: CPT

## 2020-02-16 PROCEDURE — 84145 PROCALCITONIN (PCT): CPT

## 2020-02-16 PROCEDURE — 94640 AIRWAY INHALATION TREATMENT: CPT

## 2020-02-16 PROCEDURE — 74011250636 HC RX REV CODE- 250/636: Performed by: INTERNAL MEDICINE

## 2020-02-16 PROCEDURE — 74011000250 HC RX REV CODE- 250: Performed by: INTERNAL MEDICINE

## 2020-02-16 PROCEDURE — 74011250637 HC RX REV CODE- 250/637: Performed by: EMERGENCY MEDICINE

## 2020-02-16 PROCEDURE — 94760 N-INVAS EAR/PLS OXIMETRY 1: CPT

## 2020-02-16 PROCEDURE — 77030027138 HC INCENT SPIROMETER -A

## 2020-02-16 PROCEDURE — 74011000250 HC RX REV CODE- 250: Performed by: HOSPITALIST

## 2020-02-16 PROCEDURE — 74011636637 HC RX REV CODE- 636/637: Performed by: HOSPITALIST

## 2020-02-16 RX ADMIN — ALBUTEROL SULFATE 2.5 MG: 2.5 SOLUTION RESPIRATORY (INHALATION) at 00:54

## 2020-02-16 RX ADMIN — MONTELUKAST 10 MG: 10 TABLET, FILM COATED ORAL at 08:42

## 2020-02-16 RX ADMIN — METHYLPREDNISOLONE SODIUM SUCCINATE 60 MG: 125 INJECTION, POWDER, FOR SOLUTION INTRAMUSCULAR; INTRAVENOUS at 15:13

## 2020-02-16 RX ADMIN — PIPERACILLIN AND TAZOBACTAM 3.38 G: 3; .375 INJECTION, POWDER, LYOPHILIZED, FOR SOLUTION INTRAVENOUS at 08:41

## 2020-02-16 RX ADMIN — BUDESONIDE 500 MCG: 0.5 INHALANT RESPIRATORY (INHALATION) at 08:09

## 2020-02-16 RX ADMIN — GUAIFENESIN 600 MG: 600 TABLET, EXTENDED RELEASE ORAL at 08:42

## 2020-02-16 RX ADMIN — VANCOMYCIN HYDROCHLORIDE 1250 MG: 1.25 INJECTION, POWDER, LYOPHILIZED, FOR SOLUTION INTRAVENOUS at 20:55

## 2020-02-16 RX ADMIN — PIPERACILLIN AND TAZOBACTAM 3.38 G: 3; .375 INJECTION, POWDER, LYOPHILIZED, FOR SOLUTION INTRAVENOUS at 15:13

## 2020-02-16 RX ADMIN — METHYLPREDNISOLONE SODIUM SUCCINATE 60 MG: 125 INJECTION, POWDER, FOR SOLUTION INTRAMUSCULAR; INTRAVENOUS at 06:50

## 2020-02-16 RX ADMIN — METHYLPREDNISOLONE SODIUM SUCCINATE 60 MG: 125 INJECTION, POWDER, FOR SOLUTION INTRAMUSCULAR; INTRAVENOUS at 00:43

## 2020-02-16 RX ADMIN — DILTIAZEM HYDROCHLORIDE 180 MG: 180 CAPSULE, COATED, EXTENDED RELEASE ORAL at 08:43

## 2020-02-16 RX ADMIN — BENZONATATE 100 MG: 100 CAPSULE ORAL at 19:17

## 2020-02-16 RX ADMIN — KETOROLAC TROMETHAMINE 15 MG: 30 INJECTION, SOLUTION INTRAMUSCULAR at 16:45

## 2020-02-16 RX ADMIN — ASPIRIN 81 MG: 81 TABLET, COATED ORAL at 08:42

## 2020-02-16 RX ADMIN — GUAIFENESIN 600 MG: 600 TABLET, EXTENDED RELEASE ORAL at 22:22

## 2020-02-16 RX ADMIN — Medication 500 MG: at 08:42

## 2020-02-16 RX ADMIN — HEPARIN SODIUM 5000 UNITS: 5000 INJECTION INTRAVENOUS; SUBCUTANEOUS at 10:31

## 2020-02-16 RX ADMIN — KETOROLAC TROMETHAMINE 15 MG: 30 INJECTION, SOLUTION INTRAMUSCULAR at 04:10

## 2020-02-16 RX ADMIN — PIPERACILLIN AND TAZOBACTAM 3.38 G: 3; .375 INJECTION, POWDER, LYOPHILIZED, FOR SOLUTION INTRAVENOUS at 04:03

## 2020-02-16 RX ADMIN — ONDANSETRON 4 MG: 2 INJECTION INTRAMUSCULAR; INTRAVENOUS at 09:07

## 2020-02-16 RX ADMIN — GUAIFENESIN AND CODEINE PHOSPHATE 5 ML: 100; 10 SOLUTION ORAL at 06:50

## 2020-02-16 RX ADMIN — HEPARIN SODIUM 5000 UNITS: 5000 INJECTION INTRAVENOUS; SUBCUTANEOUS at 18:36

## 2020-02-16 RX ADMIN — ONDANSETRON 4 MG: 2 INJECTION INTRAMUSCULAR; INTRAVENOUS at 19:17

## 2020-02-16 RX ADMIN — Medication 500 MG: at 22:22

## 2020-02-16 RX ADMIN — LEVOFLOXACIN 500 MG: 5 INJECTION, SOLUTION INTRAVENOUS at 16:51

## 2020-02-16 RX ADMIN — VANCOMYCIN HYDROCHLORIDE 1250 MG: 1.25 INJECTION, POWDER, LYOPHILIZED, FOR SOLUTION INTRAVENOUS at 00:43

## 2020-02-16 RX ADMIN — INSULIN LISPRO 2 UNITS: 100 INJECTION, SOLUTION INTRAVENOUS; SUBCUTANEOUS at 08:41

## 2020-02-16 RX ADMIN — KETOROLAC TROMETHAMINE 15 MG: 30 INJECTION, SOLUTION INTRAMUSCULAR at 10:30

## 2020-02-16 RX ADMIN — BENZONATATE 100 MG: 100 CAPSULE ORAL at 00:53

## 2020-02-16 RX ADMIN — METHYLPREDNISOLONE SODIUM SUCCINATE 60 MG: 125 INJECTION, POWDER, FOR SOLUTION INTRAMUSCULAR; INTRAVENOUS at 18:36

## 2020-02-16 RX ADMIN — HEPARIN SODIUM 5000 UNITS: 5000 INJECTION INTRAVENOUS; SUBCUTANEOUS at 03:03

## 2020-02-16 RX ADMIN — LEVETIRACETAM 500 MG: 500 TABLET ORAL at 22:22

## 2020-02-16 RX ADMIN — LEVETIRACETAM 500 MG: 500 TABLET ORAL at 08:42

## 2020-02-16 RX ADMIN — ARFORMOTEROL TARTRATE 15 MCG: 15 SOLUTION RESPIRATORY (INHALATION) at 21:35

## 2020-02-16 RX ADMIN — ARFORMOTEROL TARTRATE 15 MCG: 15 SOLUTION RESPIRATORY (INHALATION) at 08:09

## 2020-02-16 RX ADMIN — BUDESONIDE 500 MCG: 0.5 INHALANT RESPIRATORY (INHALATION) at 21:35

## 2020-02-16 NOTE — PROGRESS NOTES
Shift summary  Patient currently admitted with community acquired  pneumonia on antibiotics,neb treatments and solumedrol,remained hemodynamically stable all shift with pian well controlled and prn neb treatments given for wheezing . Patient Vitals for the past 12 hrs:   Temp Pulse Resp BP SpO2   02/16/20 0748 98.1 °F (36.7 °C) 78 15 131/62 95 %   02/16/20 0400 98.1 °F (36.7 °C) 80 15 136/68 94 %   02/15/20 2342 98.4 °F (36.9 °C) 89 16 140/45 98 %   02/15/20 2031 98.6 °F (37 °C) 86 16 135/68 98 %       Bedside, Verbal and Written shift change report given to ISAIAS Jacobs RN (oncoming nurse) by Jed Mcleod RN   (offgoing nurse). Report included the following information SBAR, Kardex and MAR.

## 2020-02-16 NOTE — PROGRESS NOTES
Hospitalist Progress Note-critical care note     Patient: Kelechi Hardin MRN: [id number]  CSN: [id number]    YOB: 1960  Age: 63 y.o. Sex: female    DOA: 2/14/2020 LOS:  LOS: 2 days            Chief complaint: asthma cap . Assessment/Plan         Hospital Problems  Never Reviewed          Codes Class Noted POA    Failure of outpatient treatment ICD-10-CM: Z78.9  ICD-9-CM: V49.89  2/14/2020 Yes        Moderate persistent asthma with acute exacerbation ICD-10-CM: J45.41  ICD-9-CM: 493.92  2/14/2020 Yes        CAP (community acquired pneumonia) ICD-10-CM: J18.9  ICD-9-CM: 186  2/14/2020 Unknown        Seizures ([facility] ICD-10-CM: R56.9  ICD-9-CM: 780.39  2/14/2020 Unknown        HTN (hypertension) ICD-10-CM: I10  ICD-9-CM: 401.9  11/1/2016 Yes        * (Principal) Community acquired pneumonia of right lower lobe of lung ([facility] ICD-10-CM: J18.1  ICD-9-CM: 261  11/1/2016 Unknown            Asthma exacerbation ,moderate persistent   Improving, wheezing is better   Iv steroid-will start wean tomorrow   , breathing treatment with bronchodilator   symptom treatment   ssi for glucose control          Cap-failed out -pt treatment ,  Pneumonia   continue levaquin and vanc/azithromycin   - legionella antigen urine/strep pneumonia urine negative , viral panel pending    influenza rapid screen negative    respiratory culture with gram stain-still pending   -ct chest rt side pna     Elevated lactic acidosis  Resolved      HTN, accelerated  Continue home medication.      seizure continue keppra   Seizure precaution     Subjective: feel a little bit better today   rn : no acute issue   Disposition :tbd,   Review of systems:    General: No fevers or chills. Cardiovascular: No chest pain or pressure. No palpitations. Pulmonary: coughs ,  shortness of breath-improving   Gastrointestinal: No nausea, vomiting.      Vital signs/Intake and Output:  Visit Vitals  /64   Pulse 66   Temp 97.8 °F (36.6 °C)   Resp 16 Ht 5' 2\" (1.575 m)   Wt 77.1 kg (170 lb)   SpO2 95%   Breastfeeding No   BMI 31.09 kg/m²     Current Shift:  No intake/output data recorded. Last three shifts:  02/14 1901 - 02/16 0700  In: 1450 [P.O.:650; I.V.:800]  Out: 1151 [Urine:1150]    Physical Exam:  General: WD, WN. Alert, cooperative, no acute distress    HEENT: NC, Atraumatic. PERRLA, anicteric sclerae. Lungs: Mild  Wheezing-improving , +Rhonchi/Rales-improving   Heart:  Regular  rhythm,  No murmur, No Rubs, No Gallops  Abdomen: Soft, Non distended, Non tender.  +Bowel sounds,   Extremities: No c/c/e  Psych:   Not anxious or agitated. Neurologic:  No acute neurological deficit. Labs: Results:       Chemistry Recent Labs     02/16/20 0235 02/15/20  0155 02/14/20  1553   * 150* 99    141 137   K 4.0 4.1 3.7    109 101   CO2 27 26 31   BUN 16 16 18   CREA 1.01 1.07 1.22   CA 7.6* 7.5* 8.7   AGAP 5 6 5   BUCR 16 15 15   AP  --   --  85   TP  --   --  7.3   ALB  --   --  3.7   GLOB  --   --  3.6   AGRAT  --   --  1.0      CBC w/Diff Recent Labs     02/16/20  0235 02/15/20  0155 02/14/20  1553   WBC 15.6* 5.1 3.9*   RBC 3.48* 3.26* 4.02*   HGB 10.6* 9.9* 12.1   HCT 32.3* 30.4* 36.7    249 311   GRANS 94* 92* 48   LYMPH 4* 7* 30   EOS 0 0 8*      Cardiac Enzymes No results for input(s): CPK, CKND1, YAMILKA in the last 72 hours. No lab exists for component: CKRMB, TROIP   Coagulation No results for input(s): PTP, INR, APTT, INREXT, INREXT in the last 72 hours. Lipid Panel No results found for: CHOL, CHOLPOCT, CHOLX, CHLST, CHOLV, 875803, HDL, HDLP, LDL, LDLC, DLDLP, 233283, VLDLC, VLDL, TGLX, TRIGL, TRIGP, TGLPOCT, CHHD, CHHDX   BNP No results for input(s): BNPP in the last 72 hours.    Liver Enzymes Recent Labs     02/14/20  1553   TP 7.3   ALB 3.7   AP 85   SGOT 19      Thyroid Studies No results found for: T4, T3U, TSH, TSHEXT, TSHEXT     Procedures/imaging: see electronic medical records for all procedures/Xrays and details which were not copied into this note but were reviewed prior to creation of Madelyn Oleary MD

## 2020-02-16 NOTE — PROGRESS NOTES
4970 - Patient in bed at this time. A/O x 4. IV to right FA  intact and patent. Heparin is DVT prophylaxis. Patient denies numbness/tingling. Pedal and radial pulses palpable. Lungs clear but diminished in bases. Bowel sounds active to all quadrants. Pain 6/10.     0907-IV zofran given for nausea without vomiting at this time. 1033-15 mg IV toradol given for pain 6/10 at this time. 1238-Lab called with critical result: Vanc trough 20.4.      1247-Dr. Arias Bello notified of critical lab value-Vanc trough 20.4.    1430-Removed patient IV due to report of pain, appears to be infiltrated. 1450-Attempted IV start, unsuccessful. Patient reports she is a very ahrd stick and that her left arm is very hard to find a vein,  Attempted in right arm, unsuccessfully. Veins in left arm very small and hard to see. Shift Summary: Patient's pain well controlled this shift. IV in right AC infiltrated and removed, new IV placed in left handremains intact. Heparin is DVT prophylaxis.

## 2020-02-16 NOTE — PROGRESS NOTES
Problem: Falls - Risk of  Goal: *Absence of Falls  Description  Document Ace Brown Fall Risk and appropriate interventions in the flowsheet.   2/16/2020 1649 by Cristy Carmona RN  Outcome: Progressing Towards Goal  Note: Fall Risk Interventions:  Mobility Interventions: Patient to call before getting OOB, Bed/chair exit alarm    Mentation Interventions: Adequate sleep, hydration, pain control    Medication Interventions: Patient to call before getting OOB, Teach patient to arise slowly    Elimination Interventions: Call light in reach           2/16/2020 0758 by Cristy Carmona RN  Outcome: Progressing Towards Goal  Note: Fall Risk Interventions:  Mobility Interventions: Patient to call before getting OOB, Bed/chair exit alarm    Mentation Interventions: Adequate sleep, hydration, pain control    Medication Interventions: Patient to call before getting OOB, Teach patient to arise slowly    Elimination Interventions: Call light in reach

## 2020-02-16 NOTE — PROGRESS NOTES
1615: Report received from [facility]. Patient in bed resting. New 24G PIV in Left Hand by medic per Vinh Mayfield. No distress noted. Rn Cheri Blanco started zosyn abx.     4241: patient complains of 7/10 pain to right back near shoulder blade. Patient complains that PRN Toradol burns PIV. Toradol diluted with NS and flushed with NS. Patient tolerates well. Levaquin started and patient educated to call nurse if PIV starts burning    9473 patient complains of PIV burning. Lined flushed and Levaquin stopped. Medic, [address], called to assist with a new PIV    000 report given to [facility] Rn  Patient in bed resting.  No Distress noted

## 2020-02-16 NOTE — PROGRESS NOTES
Pulmonary and Sleep Medicine     Pulmonary Note    Patient: Greg Escobar               Sex: female          DOA: 2/14/2020       YOB: 1960      Age:  63 y.o.        LOS:  LOS: 2 days        Reason for Consult: pneumonia, asthma exacerbation    IMPRESSION:   Patient Active Problem List   Diagnosis Code    Community acquired pneumonia of right lower lobe of lung ([facility] [address] J18.1    Asthma exacerbation J45. 0    Obstructive sleep apnea G47.33    Failure of outpatient treatment Z78.9    Anemia D64.9    HTN (hypertension) I10    Peptic ulcer disease     DM     Elevated lactate     Seizures disorder (HCC)       RECOMMENDATIONS:   Compensated respiratory status and on room air; monitor for goal SPO2> 91%  Sputum culture, respiratory vital panel, Procalcitonin will be followed  UR legionella and streptococcus Ag negative; Influenza rapid negative  Periodic CT chest to follow up on clearing  Continue bronchodilators, pulmonary hygiene care  Steroids - taper per clinical course  Antibiotic choice: Vancomycin, Levofloxacin, Zosyn. Aspiration prevention bundle, head of the bed at 30' all times  Glycemic control  AM labs. Diet as tolerated  Will defer respective systems problem management to primary and other consultant and follow patient with primary and other team  Further recommendations will be based on the patient's response to recommended treatment and results of the investigation ordered. HPI:   Ms. Greg Escobar is a 63 y.o. female who is seen at the request of Dr. Anatoly Powell for pneumonia and asthma exacerbation. Patient reports pmhx of asthma managed by PCP, DAPHNE on CPAP intermittently managed by Dr. Juanito Avila admitted with RLL pneumonia and asthma exacerbation after presented to ER with worsening cough for 3 weeks associated with fever, wheezing, SOB. Reports past 3 yrs getting pneumonia during this seasons. Had sick contacts through children in home.  She has been treated with Augmentin and then Azithromycin as outpatient in past 3 weeks by PCP without relief. In ER, Flu screen was negative; lactic acid was 2, WBC normal. Chest x-ray and CT chest indicated right-sided pneumonia. Started on iv abx, steroid. The patient denies sore throat, adenopathy, chest pain, hemoptysis, dysphagia, nausea, vomiting, bleeding, travel. Denies smoking/ any alcohol. Prior brief crack cocaine use quit 1 yr ago. 02/16/20  Patient reports significant improvement in SOB and wheezing  Cough present but improving; less phlegm  Using Aerobika and finding benefit  Afebrile; denies chest pain, hemoptysis.      Review of Systems  General ROS: positive for - fever, chills, fatigue and malaise  Cardiovascular ROS: negative for - chest pain, edema, murmur, orthopnea, palpitations or paroxysmal nocturnal dyspnea  Gastrointestinal ROS: no abdominal pain, change in bowel habits, or black or bloody stools  Dermatological ROS: negative for - pruritus, rash or skin lesion changes     Past Medical History  Past Medical History:   Diagnosis Date    Arthritis     Asthma     Chronic kidney disease     deformed right  kidney    Diabetes ([address]     Hypertension     Migraines     PUD (peptic ulcer disease)     H/O, no meds at this time 2016    Seizures ([address]     Unspecified sleep apnea     uses cpap       Past Surgical History  Past Surgical History:   Procedure Laterality Date    HX APPENDECTOMY      HX GYN      hysterectomy       Family History  Family History   Problem Relation Age of Onset    Malignant Hyperthermia Neg Hx     Pseudocholinesterase Deficiency Neg Hx     Delayed Awakening Neg Hx     Post-op Nausea/Vomiting Neg Hx     Emergence Delirium Neg Hx     Post-op Cognitive Dysfunction Neg Hx     Other Neg Hx        Social History  Social History     Tobacco Use    Smoking status: Never Smoker    Smokeless tobacco: Never Used   Substance Use Topics    Alcohol use: No    Drug use: No        Medications  Current Facility-Administered Medications   Medication Dose Route Frequency    Vancomycin trough level 30 minutes prior to 1200 dose on 02/16  1 Each Other ONCE    Vancomycin-Pharmacy to dose  1 Each Other Rx Dosing/Monitoring    vancomycin (VANCOCIN) 1,250 mg in 0.9% sodium chloride 250 mL (VIAL-MATE)  1,250 mg IntraVENous Q12H    piperacillin-tazobactam (ZOSYN) 3.375 g in 0.9% sodium chloride (MBP/ADV) 100 mL MBP  3.375 g IntraVENous Q6H    albuterol (PROVENTIL VENTOLIN) nebulizer solution 2.5 mg  2.5 mg Nebulization Q4H PRN    benzonatate (TESSALON) capsule 100 mg  100 mg Oral TID PRN    ketorolac (TORADOL) injection 15 mg  15 mg IntraVENous Q6H PRN    naloxone (NARCAN) injection 0.4 mg  0.4 mg IntraVENous PRN    diphenhydrAMINE (BENADRYL) injection 12.5 mg  12.5 mg IntraVENous Q4H PRN    ondansetron (ZOFRAN) injection 4 mg  4 mg IntraVENous Q4H PRN    heparin (porcine) injection 5,000 Units  5,000 Units SubCUTAneous Q8H    guaiFENesin ER (MUCINEX) tablet 600 mg  600 mg Oral Q12H    arformoteroL (BROVANA) neb solution 15 mcg  15 mcg Nebulization BID RT    budesonide (PULMICORT) 500 mcg/2 ml nebulizer suspension  500 mcg Nebulization BID RT    methylPREDNISolone (PF) (Solu-MEDROL) injection 60 mg  60 mg IntraVENous Q6H    levoFLOXacin (LEVAQUIN) 500 mg in D5W IVPB  500 mg IntraVENous Q24H    aspirin delayed-release tablet 81 mg  81 mg Oral DAILY    guaiFENesin-codeine (ROBITUSSIN AC) 100-10 mg/5 mL solution 5 mL  5 mL Oral TID PRN    levETIRAcetam (KEPPRA) tablet 500 mg  500 mg Oral BID    montelukast (SINGULAIR) tablet 10 mg  10 mg Oral DAILY    dilTIAZem CD (CARDIZEM CD) capsule 180 mg  180 mg Oral DAILY    Saccharomyces boulardii (FLORASTOR) capsule 500 mg  500 mg Oral BID    insulin lispro (HUMALOG) injection   SubCUTAneous AC&HS    glucose chewable tablet 16 g  4 Tab Oral PRN    glucagon (GLUCAGEN) injection 1 mg  1 mg IntraMUSCular PRN     Prior to Admission medications    Medication Sig Start Date End Date Taking? Authorizing Provider   aspirin delayed-release 81 mg tablet Take 81 mg by mouth daily. Yes Provider, Historical   CASCARA SAGRADA PO Take  by mouth. Other, MD Kirk   dilTIAZem XR (DILACOR XR) 180 mg XR capsule Take  by mouth daily. Other, MD Kirk   albuterol (PROVENTIL HFA, VENTOLIN HFA, PROAIR HFA) 90 mcg/actuation inhaler Take 2 Puffs by inhalation every four (4) hours as needed for Wheezing or Shortness of Breath. 1/9/19   Sara Miller PA-C   inhalational spacing device 1 Each by Does Not Apply route as needed. Please dispense one adult spacer to be used with albuterol HFA. 1/9/19   Sara Miller PA-C   predniSONE (STERAPRED DS) 10 mg dose pack Take with food. 1/9/19   Billy Ramires PA-C   albuterol (ACCUNEB) 1.25 mg/3 mL nebu Take 3 mL by inhalation every four (4) hours as needed (wheezing). 1/9/19   Billy Ramires PA-C   ondansetron (ZOFRAN ODT) 4 mg disintegrating tablet Take 1 Tab by mouth every eight (8) hours as needed for Nausea. 1/9/19   Gloria Ramires PA-C   guaiFENesin-codeine (ROBITUSSIN AC) 100-10 mg/5 mL solution Take 5 mL by mouth three (3) times daily as needed for Cough. Max Daily Amount: 15 mL. 1/9/19   Billy Ramires PA-C   clonazePAM (KLONOPIN) 0.5 mg tablet Take 1 Tab by mouth three (3) times daily. Max Daily Amount: 1.5 mg. 3/26/17   Nik Davidson MD   dronabinol (MARINOL) 2.5 mg capsule Take 1 Cap by mouth Before breakfast and dinner. Max Daily Amount: 5 mg. 3/26/17   Nik Davidson MD   montelukast (SINGULAIR) 10 mg tablet Take 10 mg by mouth daily. Provider, Historical   naproxen (NAPROSYN) 500 mg tablet Take 500 mg by mouth two (2) times daily as needed. Provider, Historical   raNITIdine (ZANTAC) 150 mg tablet Take 150 mg by mouth two (2) times a day. Kacie Newman MD   conjugated estrogens (PREMARIN) 0.9 mg tablet Take 0.9 mg by mouth daily.     Kacie Newman MD   acetaminophen (TYLENOL) 500 mg tablet Take 1,000 mg by mouth every six (6) hours as needed for Pain or Fever. Florentino Brown MD   fluticasone-salmeterol (ADVAIR) 250-50 mcg/dose diskus inhaler Take 1 Puff by inhalation two (2) times a day. 16   Karlos Huber MD   escitalopram oxalate (LEXAPRO) 10 mg tablet Take 15 mg by mouth daily. Neil Yates   levETIRAcetam (KEPPRA) 500 mg tablet Take 500 mg by mouth two (2) times a day. Kirk Ryan MD   SUMAtriptan succinate 6 mg/0.5 mL kit 6 mg by SubCUTAneous route once as needed for Migraine. Kirk Ryan MD   losartan-hydrochlorothiazide (HYZAAR) 100-25 mg per tablet Take 1 Tab by mouth daily. Kirk Ryan MD   OXcarbazepine (TRILEPTAL) 300 mg tablet Take 300 mg by mouth two (2) times a day. Kirk Ryan MD       Allergy  Allergies   Allergen Reactions    Morphine Itching     Can tolerate if premed w/ benadryl    Pertussis Vaccine,Adsorbed Swelling     Swelling at injection site    Vicodin [Hydrocodone-Acetaminophen] Itching     Can tolerate if premed w/ benadryl       Physical Exam:   Vital Signs:    Blood pressure 129/64, pulse 66, temperature 97.8 °F (36.6 °C), resp. rate 16, height 5' 2\" (1.575 m), weight 77.1 kg (170 lb), SpO2 95 %, not currently breastfeeding. Body mass index is 31.09 kg/m². O2 Device: Room air       Temp (24hrs), Av.1 °F (36.7 °C), Min:97.7 °F (36.5 °C), Max:98.6 °F (37 °C)       Intake/Output:   Last shift:      No intake/output data recorded. Last 3 shifts:  1901 -  0700  In: 1450 [P.O.:650;  I.V.:800]  Out: 1151 [Urine:1150]    Intake/Output Summary (Last 24 hours) at 2020 1330  Last data filed at 2020 3434  Gross per 24 hour   Intake 1450 ml   Output 950 ml   Net 500 ml      General: in no respiratory distress, able to finish full sentences, cooperative, appears stated age, on RA  HEENT: PERRLA, throat normal without erythema or exudate  Neck: No abnormally enlarged lymph nodes or thyroid, supple  Chest: normal, obese chest wall  Lungs: moderate air entry, rhonchi RT base; no wheezes bilaterally, normal percussion anterior chestwall bilaterally, no tenderness/ rash  Heart: Regular rate and rhythm, S1S2 present or without murmur or extra heart sounds  Abdomen: obese, bowel sounds normoactive, tympanic, soft without significant tenderness, masses, organomegaly or guarding  Extremity: negative for edema, cyanosis, clubbing  Neuro: alert, oriented x 3, no defects noted in general exam.  Skin: Skin color, texture, turgor normal. No rashes or lesions    Labs Reviewed:  Recent Results (from the past 24 hour(s))   GLUCOSE, POC    Collection Time: 02/15/20  5:06 PM   Result Value Ref Range    Glucose (POC) 112 (H) 70 - 110 mg/dL   GLUCOSE, POC    Collection Time: 02/15/20  9:27 PM   Result Value Ref Range    Glucose (POC) 123 (H) 70 - 940 mg/dL   METABOLIC PANEL, BASIC    Collection Time: 02/16/20  2:35 AM   Result Value Ref Range    Sodium 143 136 - 145 mmol/L    Potassium 4.0 3.5 - 5.5 mmol/L    Chloride 111 100 - 111 mmol/L    CO2 27 21 - 32 mmol/L    Anion gap 5 3.0 - 18 mmol/L    Glucose 135 (H) 74 - 99 mg/dL    BUN 16 7.0 - 18 MG/DL    Creatinine 1.01 0.6 - 1.3 MG/DL    BUN/Creatinine ratio 16 12 - 20      GFR est AA >60 >60 ml/min/1.73m2    GFR est non-AA 56 (L) >60 ml/min/1.73m2    Calcium 7.6 (L) 8.5 - 10.1 MG/DL   MAGNESIUM    Collection Time: 02/16/20  2:35 AM   Result Value Ref Range    Magnesium 2.1 1.6 - 2.6 mg/dL   CBC WITH AUTOMATED DIFF    Collection Time: 02/16/20  2:35 AM   Result Value Ref Range    WBC 15.6 (H) 4.6 - 13.2 K/uL    RBC 3.48 (L) 4.20 - 5.30 M/uL    HGB 10.6 (L) 12.0 - 16.0 g/dL    HCT 32.3 (L) 35.0 - 45.0 %    MCV 92.8 74.0 - 97.0 FL    MCH 30.5 24.0 - 34.0 PG    MCHC 32.8 31.0 - 37.0 g/dL    RDW 12.8 11.6 - 14.5 %    PLATELET 777 009 - 566 K/uL    MPV 10.5 9.2 - 11.8 FL    NEUTROPHILS 94 (H) 40 - 73 %    LYMPHOCYTES 4 (L) 21 - 52 %    MONOCYTES 2 (L) 3 - 10 %    EOSINOPHILS 0 0 - 5 %    BASOPHILS 0 0 - 2 %    ABS. NEUTROPHILS 14.6 (H) 1.8 - 8.0 K/UL    ABS. LYMPHOCYTES 0.6 (L) 0.9 - 3.6 K/UL    ABS. MONOCYTES 0.4 0.05 - 1.2 K/UL    ABS. EOSINOPHILS 0.0 0.0 - 0.4 K/UL    ABS.  BASOPHILS 0.0 0.0 - 0.1 K/UL    DF AUTOMATED     GLUCOSE, POC    Collection Time: 02/16/20  7:51 AM   Result Value Ref Range    Glucose (POC) 163 (H) 70 - 110 mg/dL   VANCOMYCIN, TROUGH    Collection Time: 02/16/20 11:20 AM   Result Value Ref Range    Vancomycin,trough 20.4 (HH) 10.0 - 20.0 ug/mL    Reported dose date: 20200216      Reported dose time: 0044      Reported dose: 1,250 UNITS   GLUCOSE, POC    Collection Time: 02/16/20 11:47 AM   Result Value Ref Range    Glucose (POC) 147 (H) 70 - 110 mg/dL           Recent Labs     02/15/20  0921   FIO2I 21       All Micro Results     Procedure Component Value Units Date/Time    CULTURE, RESPIRATORY/SPUTUM/BRONCH Roddy Dew STAIN [[id number]] Collected:  02/15/20 0900    Order Status:  Completed Specimen:  Sputum Updated:  02/16/20 1024    CULTURE, BLOOD [[id number]] Collected:  02/14/20 1553    Order Status:  Completed Specimen:  Blood Updated:  02/16/20 0748     Special Requests: NO SPECIAL REQUESTS        Culture result: NO GROWTH 2 DAYS       CULTURE, BLOOD [[id number]] Collected:  02/14/20 1612    Order Status:  Completed Specimen:  Blood Updated:  02/16/20 0748     Special Requests: NO SPECIAL REQUESTS        Culture result: NO GROWTH 2 DAYS       RESPIRATORY VIRAL PANEL, PCR [[id number]] Collected:  02/15/20 1510    Order Status:  Completed Specimen:  Sputum Updated:  02/15/20 1533    LEGIONELLA PNEUMOPHILA Rod Bone [[id number]] Collected:  02/15/20 0445    Order Status:  Completed Specimen:  Urine, random Updated:  02/15/20 1221     Legionella Ag, urine NEGATIVE        STREP PNEUMO AG, URINE [[id number]] Collected:  02/15/20 0445    Order Status:  Completed Specimen:  Urine, random Updated:  02/15/20 1221     Strep pneumo Ag, urine NEGATIVE        INFLUENZA A & B AG (RAPID TEST) [[id number]] Collected:  02/14/20 1607    Order Status:  Completed Specimen:  Nasopharyngeal from Nasal washing Updated:  02/14/20 1628     Influenza A Antigen NEGATIVE         Comment: A negative result does not exclude influenza virus infection, seasonal or H1N1 due to suboptimal sensitivity. If influenza is circulating in your community, a diagnosis of influenza should be considered based on a patients clinical presentation and empiric antiviral treatment should be considered, if indicated. Influenza B Antigen NEGATIVE              Imaging:  [x]I have personally reviewed the patients chest radiographs images and report with the patient  Results from Hospital Encounter encounter on 02/14/20   XR CHEST PA LAT    Narrative EXAM: XR CHEST PA LAT    CLINICAL INDICATION/HISTORY: cough  -Additional: None    COMPARISON: Several prior exams, most recently 1/9/2019    TECHNIQUE: PA and lateral views of the chest    _______________    FINDINGS:    HEART AND MEDIASTINUM: Cardiac size and mediastinal contours are within normal  limits    LUNGS AND PLEURAL SPACES: Lungs are mildly underexpanded. Asymmetric area of  opacity at the medial right lung base. No pneumothorax or pleural effusion. BONY THORAX AND SOFT TISSUES: No acute osseous abnormality    _______________      Impression IMPRESSION:    Mildly underexpanded lungs with likely infiltrate in the medial right lung base. No accompanying pleural effusion. Recommend radiographic follow-up to document  expected clinical resolution in 4-6 weeks' time. Results from [place] encounter on 02/14/20   CT CHEST WO CONT    Narrative EXAM: CT Chest     INDICATION: Patient currently admitted for bronchitis, not improving despite  several weeks of antibiotics. .    COMPARISON: Abdominal/pelvic CT 1/14/2016; prior chest radiographs 2/14/2020. Peggyann Gang     TECHNIQUE: Axial CT imaging from the thoracic inlet through the diaphragm  without intravenous contrast. Multiplanar reformations were generated. One or more dose reduction techniques were used on this CT: automated exposure  control, adjustment of the mAs and/or kVp according to patient size, and  iterative reconstruction techniques. The specific techniques used on this CT  exam have been documented in the patient's electronic medical record. Digital  Imaging and Communications in Medicine (DICOM) format image data are available  to nonaffiliated external healthcare facilities or entities on a secure, media  free, reciprocally searchable basis with patient authorization for at least a  12-month period after this study. _______________    FINDINGS:    LUNGS: Alveolar consolidation and groundglass density present within the medial  right lower lobe with additional faint centrilobular nodules present within the  periphery of the right lower lobe. The remaining right lung is clear. Minor  basilar changes of atelectasis at the dependent left lower lobe. PLEURA: No pneumothorax or pleural effusion. AIRWAY: Unremarkable. MEDIASTINUM: Included thyroid gland is unremarkable. Normal cardiac size. No  pericardial effusion. Thoracic aorta is of normal course and caliber. LYMPH NODES: Enlarged right supraclavicular lymph node (series 2, image 9)  measures approximately 1.5 cm in short axis diameter. Left supraclavicular alex  basin appears within normal limits. No axillary or mediastinal adenopathy. No  enlarged hilar lymph nodes. UPPER ABDOMEN: Included upper abdomen demonstrates no acute abnormality. Small  hiatal hernia. OSSEOUS STRUCTURES: No acute osseous abnormality. No suspicious or blastic  osseous lesions. OTHER:    _______________      Impression IMPRESSION:      1. Right lower lobe pneumonia without evidence of accompanying pleural effusion. As previously, recommend radiographic follow-up to document expected clinical  resolution in 4-6 weeks' time following appropriate antibiotic treatment.     2. Indeterminate right sternoclavicular lymph nodes noted enlargement. Unclear  if this is a reactive phenomenon versus manifestation of separate disease  process. Clinical follow-up recommended. If lymph node enlargement persists,  consider dedicated imaging and/or tissue sampling. [x]See my orders for details    My assessment, plan of care, findings, medications, side effects etc were discussed with:  [x]nursing []PT/OT    [x]respiratory therapy [x]Dr. Noris Flores [x]Patient     [x]High complexity decision making performed and > 50% time spent in face to face consultation.     Mei Patel MD

## 2020-02-17 LAB
ANION GAP SERPL CALC-SCNC: 6 MMOL/L (ref 3–18)
BASOPHILS # BLD: 0 K/UL (ref 0–0.1)
BASOPHILS NFR BLD: 0 % (ref 0–2)
BUN SERPL-MCNC: 24 MG/DL (ref 7–18)
BUN/CREAT SERPL: 21 (ref 12–20)
CALCIUM SERPL-MCNC: 7.6 MG/DL (ref 8.5–10.1)
CHLORIDE SERPL-SCNC: 111 MMOL/L (ref 100–111)
CO2 SERPL-SCNC: 26 MMOL/L (ref 21–32)
CREAT SERPL-MCNC: 1.13 MG/DL (ref 0.6–1.3)
DIFFERENTIAL METHOD BLD: ABNORMAL
EOSINOPHIL # BLD: 0 K/UL (ref 0–0.4)
EOSINOPHIL NFR BLD: 0 % (ref 0–5)
ERYTHROCYTE [DISTWIDTH] IN BLOOD BY AUTOMATED COUNT: 12.8 % (ref 11.6–14.5)
GLUCOSE BLD STRIP.AUTO-MCNC: 125 MG/DL (ref 70–110)
GLUCOSE BLD STRIP.AUTO-MCNC: 137 MG/DL (ref 70–110)
GLUCOSE BLD STRIP.AUTO-MCNC: 142 MG/DL (ref 70–110)
GLUCOSE BLD STRIP.AUTO-MCNC: 156 MG/DL (ref 70–110)
GLUCOSE SERPL-MCNC: 137 MG/DL (ref 74–99)
HCT VFR BLD AUTO: 31.6 % (ref 35–45)
HGB BLD-MCNC: 10.3 G/DL (ref 12–16)
LYMPHOCYTES # BLD: 0.6 K/UL (ref 0.9–3.6)
LYMPHOCYTES NFR BLD: 4 % (ref 21–52)
MAGNESIUM SERPL-MCNC: 2.1 MG/DL (ref 1.6–2.6)
MCH RBC QN AUTO: 30 PG (ref 24–34)
MCHC RBC AUTO-ENTMCNC: 32.6 G/DL (ref 31–37)
MCV RBC AUTO: 92.1 FL (ref 74–97)
MONOCYTES # BLD: 0.2 K/UL (ref 0.05–1.2)
MONOCYTES NFR BLD: 1 % (ref 3–10)
NEUTS SEG # BLD: 15.5 K/UL (ref 1.8–8)
NEUTS SEG NFR BLD: 95 % (ref 40–73)
PLATELET # BLD AUTO: 336 K/UL (ref 135–420)
PMV BLD AUTO: 10.5 FL (ref 9.2–11.8)
POTASSIUM SERPL-SCNC: 4 MMOL/L (ref 3.5–5.5)
PROCALCITONIN SERPL-MCNC: <0.05 NG/ML
RBC # BLD AUTO: 3.43 M/UL (ref 4.2–5.3)
SODIUM SERPL-SCNC: 143 MMOL/L (ref 136–145)
WBC # BLD AUTO: 16.3 K/UL (ref 4.6–13.2)

## 2020-02-17 PROCEDURE — 80048 BASIC METABOLIC PNL TOTAL CA: CPT

## 2020-02-17 PROCEDURE — 74011000258 HC RX REV CODE- 258: Performed by: INTERNAL MEDICINE

## 2020-02-17 PROCEDURE — 74011250636 HC RX REV CODE- 250/636: Performed by: HOSPITALIST

## 2020-02-17 PROCEDURE — 74011250636 HC RX REV CODE- 250/636: Performed by: INTERNAL MEDICINE

## 2020-02-17 PROCEDURE — 74011000250 HC RX REV CODE- 250: Performed by: HOSPITALIST

## 2020-02-17 PROCEDURE — 74011000250 HC RX REV CODE- 250: Performed by: INTERNAL MEDICINE

## 2020-02-17 PROCEDURE — 36415 COLL VENOUS BLD VENIPUNCTURE: CPT

## 2020-02-17 PROCEDURE — 77030027138 HC INCENT SPIROMETER -A

## 2020-02-17 PROCEDURE — 85025 COMPLETE CBC W/AUTO DIFF WBC: CPT

## 2020-02-17 PROCEDURE — 94760 N-INVAS EAR/PLS OXIMETRY 1: CPT

## 2020-02-17 PROCEDURE — 82962 GLUCOSE BLOOD TEST: CPT

## 2020-02-17 PROCEDURE — 65270000029 HC RM PRIVATE

## 2020-02-17 PROCEDURE — 74011636637 HC RX REV CODE- 636/637: Performed by: HOSPITALIST

## 2020-02-17 PROCEDURE — 94640 AIRWAY INHALATION TREATMENT: CPT

## 2020-02-17 PROCEDURE — 74011250637 HC RX REV CODE- 250/637: Performed by: HOSPITALIST

## 2020-02-17 PROCEDURE — 83735 ASSAY OF MAGNESIUM: CPT

## 2020-02-17 RX ORDER — IPRATROPIUM BROMIDE 0.5 MG/2.5ML
0.5 SOLUTION RESPIRATORY (INHALATION)
Status: DISCONTINUED | OUTPATIENT
Start: 2020-02-17 | End: 2020-02-18 | Stop reason: HOSPADM

## 2020-02-17 RX ADMIN — KETOROLAC TROMETHAMINE 15 MG: 30 INJECTION, SOLUTION INTRAMUSCULAR at 16:28

## 2020-02-17 RX ADMIN — ONDANSETRON 4 MG: 2 INJECTION INTRAMUSCULAR; INTRAVENOUS at 03:01

## 2020-02-17 RX ADMIN — LEVETIRACETAM 500 MG: 500 TABLET ORAL at 21:57

## 2020-02-17 RX ADMIN — VANCOMYCIN HYDROCHLORIDE 1250 MG: 1.25 INJECTION, POWDER, LYOPHILIZED, FOR SOLUTION INTRAVENOUS at 08:25

## 2020-02-17 RX ADMIN — KETOROLAC TROMETHAMINE 15 MG: 30 INJECTION, SOLUTION INTRAMUSCULAR at 03:00

## 2020-02-17 RX ADMIN — INSULIN LISPRO 2 UNITS: 100 INJECTION, SOLUTION INTRAVENOUS; SUBCUTANEOUS at 21:57

## 2020-02-17 RX ADMIN — METHYLPREDNISOLONE SODIUM SUCCINATE 60 MG: 125 INJECTION, POWDER, FOR SOLUTION INTRAMUSCULAR; INTRAVENOUS at 16:28

## 2020-02-17 RX ADMIN — METHYLPREDNISOLONE SODIUM SUCCINATE 60 MG: 125 INJECTION, POWDER, FOR SOLUTION INTRAMUSCULAR; INTRAVENOUS at 08:26

## 2020-02-17 RX ADMIN — HEPARIN SODIUM 5000 UNITS: 5000 INJECTION INTRAVENOUS; SUBCUTANEOUS at 11:47

## 2020-02-17 RX ADMIN — METHYLPREDNISOLONE SODIUM SUCCINATE 60 MG: 125 INJECTION, POWDER, FOR SOLUTION INTRAMUSCULAR; INTRAVENOUS at 02:50

## 2020-02-17 RX ADMIN — GUAIFENESIN 600 MG: 600 TABLET, EXTENDED RELEASE ORAL at 08:26

## 2020-02-17 RX ADMIN — GUAIFENESIN AND CODEINE PHOSPHATE 5 ML: 100; 10 SOLUTION ORAL at 22:04

## 2020-02-17 RX ADMIN — LEVETIRACETAM 500 MG: 500 TABLET ORAL at 08:26

## 2020-02-17 RX ADMIN — MONTELUKAST 10 MG: 10 TABLET, FILM COATED ORAL at 08:26

## 2020-02-17 RX ADMIN — Medication 500 MG: at 08:26

## 2020-02-17 RX ADMIN — ARFORMOTEROL TARTRATE 15 MCG: 15 SOLUTION RESPIRATORY (INHALATION) at 20:56

## 2020-02-17 RX ADMIN — METHYLPREDNISOLONE SODIUM SUCCINATE 60 MG: 125 INJECTION, POWDER, FOR SOLUTION INTRAMUSCULAR; INTRAVENOUS at 23:34

## 2020-02-17 RX ADMIN — HEPARIN SODIUM 5000 UNITS: 5000 INJECTION INTRAVENOUS; SUBCUTANEOUS at 02:49

## 2020-02-17 RX ADMIN — LEVOFLOXACIN 500 MG: 5 INJECTION, SOLUTION INTRAVENOUS at 16:29

## 2020-02-17 RX ADMIN — HEPARIN SODIUM 5000 UNITS: 5000 INJECTION INTRAVENOUS; SUBCUTANEOUS at 17:48

## 2020-02-17 RX ADMIN — GUAIFENESIN AND CODEINE PHOSPHATE 5 ML: 100; 10 SOLUTION ORAL at 03:00

## 2020-02-17 RX ADMIN — PIPERACILLIN AND TAZOBACTAM 3.38 G: 3; .375 INJECTION, POWDER, LYOPHILIZED, FOR SOLUTION INTRAVENOUS at 00:11

## 2020-02-17 RX ADMIN — PIPERACILLIN AND TAZOBACTAM 3.38 G: 3; .375 INJECTION, POWDER, LYOPHILIZED, FOR SOLUTION INTRAVENOUS at 06:20

## 2020-02-17 RX ADMIN — ALBUTEROL SULFATE 2.5 MG: 2.5 SOLUTION RESPIRATORY (INHALATION) at 07:20

## 2020-02-17 RX ADMIN — IPRATROPIUM BROMIDE 0.5 MG: 0.5 SOLUTION RESPIRATORY (INHALATION) at 20:55

## 2020-02-17 RX ADMIN — ASPIRIN 81 MG: 81 TABLET, COATED ORAL at 08:26

## 2020-02-17 RX ADMIN — Medication 500 MG: at 21:56

## 2020-02-17 RX ADMIN — KETOROLAC TROMETHAMINE 15 MG: 30 INJECTION, SOLUTION INTRAMUSCULAR at 10:19

## 2020-02-17 RX ADMIN — DILTIAZEM HYDROCHLORIDE 180 MG: 180 CAPSULE, COATED, EXTENDED RELEASE ORAL at 08:26

## 2020-02-17 RX ADMIN — ALBUTEROL SULFATE 2.5 MG: 2.5 SOLUTION RESPIRATORY (INHALATION) at 03:51

## 2020-02-17 RX ADMIN — BUDESONIDE 500 MCG: 0.5 INHALANT RESPIRATORY (INHALATION) at 07:08

## 2020-02-17 RX ADMIN — BUDESONIDE 500 MCG: 0.5 INHALANT RESPIRATORY (INHALATION) at 20:56

## 2020-02-17 RX ADMIN — GUAIFENESIN 600 MG: 600 TABLET, EXTENDED RELEASE ORAL at 21:57

## 2020-02-17 RX ADMIN — ARFORMOTEROL TARTRATE 15 MCG: 15 SOLUTION RESPIRATORY (INHALATION) at 07:08

## 2020-02-17 NOTE — PROGRESS NOTES
36 Report received from Drew Martinez RN. Patient in satisfactory condition. VS stable per last set. Pain voiced at 2/10, will medicate at appropriate time per patient request. IS educated and encouraged. Shift POC reviewed with patient. Menu/meal times explained. Call/bell phone within reach. Will monitor for changes. 1230 Patient up ad escobar performing ADL;s in patient with spouse. No needs at this time. Will monitor for changes. 1430 Medic unsuccessful at IV attempts. Will utilize 24 gauge in L hand although patient voices mild discomfort. Patient agrees. 1940 Bedside and Verbal shift change report given to Carlos Platt RN (oncoming nurse) by Jazmin Lam RN (offgoing nurse). Report included the following information SBAR, Kardex, MAR, Recent Results and Med Rec Status.

## 2020-02-17 NOTE — PROGRESS NOTES
Pulmonary and Sleep Medicine     Pulmonary Note    Patient: Karlos Munoz               Sex: female          DOA: 2/14/2020       YOB: 1960      Age:  63 y.o.        LOS:  LOS: 3 days        Reason for Consult: pneumonia, asthma exacerbation    IMPRESSION:   Patient Active Problem List   Diagnosis Code    Community acquired pneumonia of right lower lobe of lung ([facility] [address] J18.1    Asthma exacerbation J45. [address]    Obstructive sleep apnea G47.33    Failure of outpatient treatment Z78.9    Anemia D64.9    HTN (hypertension) I10    Peptic ulcer disease     DM     Elevated lactate     Seizures disorder (HCC)       RECOMMENDATIONS:   Compensated respiratory status and on room air; monitor for goal SPO2> 91%  Woke up last night with respiratory distress, required bronchodilator as needed. Overall respiratory status has been improving. CT chest: RLL pneumonia. Indeterminate right sternoclavicular lymph node enlargement of unclear reason, could be reactive versus manifestation of separate disease process. Recommend CT as outpatient. Leukocytosis likely due to steroid use. No fever or sputum production. Hemodynamic stable. Urine Legionella and pneumococcal antigen negative. Rapid influenza test negative. Sputum culture light normal debbi. Blood culture NGTD. Respiratory viral culture pending. Antibiotic choice: Vancomycin, Levofloxacin, Zosyn. Aspiration prevention bundle, head of the bed at 30' all times  Oxygen: On room air. Keep SPO2 >91%. Bronchodilators: Brovana nebulized twice daily, Pulmicort nebulized twice daily. Albuterol HFA as needed. Start ipratropium 4 times daily. Continue Singulair 10 mg daily. Steroids: Solu-Medrol 60 mg IV every 8 hours. Diuretics: Use Lasix as needed  Airway clearance: Continue incentive spirometer. Nilda Jeannette as needed. Mucinex.     Glycemic control  Will defer respective systems problem management to primary and other consultant and follow patient with primary and other team  Further recommendations will be based on the patient's response to recommended treatment and results of the investigation ordered. Discussed with patient, RN. If improving, likely discharge home in 1 to 2 days. Outpatient PFT. Outpatient follow-up: Dr. Tami Gutierres for asthma (and Dr. Rose Mary Swartz for DAPHNE.)     HPI:   Ms. Diane Lee is a 63 y.o. female who is seen at the request of Dr. Tracey Owens for pneumonia and asthma exacerbation. Patient reports pmhx of asthma managed by PCP, DAPHNE on CPAP intermittently managed by Dr. Rose Mary Swartz admitted with RLL pneumonia and asthma exacerbation after presented to ER with worsening cough for 3 weeks associated with fever, wheezing, SOB. Reports past 3 yrs getting pneumonia during this seasons. Had sick contacts through children in home. She has been treated with Augmentin and then Azithromycin as outpatient in past 3 weeks by PCP without relief. In ER, Flu screen was negative; lactic acid was 2, WBC normal. Chest x-ray and CT chest indicated right-sided pneumonia. Started on iv abx, steroid. The patient denies sore throat, adenopathy, chest pain, hemoptysis, dysphagia, nausea, vomiting, bleeding, travel. Denies smoking/ any alcohol. Prior brief crack cocaine use quit 1 yr ago. 02/17/20  Respiratory status improving. Patient feels better. She is able to walk with reduced DELATORRE and fatigue. She required albuterol as needed last night. Cough mostly dry, occasional clear expectoration. No hemoptysis. No leg edema, PND, fever, chest pain, PND. Using incentive spirometer and [place]. Overall she feels better and improved. She wants to go home likely tomorrow.     Review of Systems  General ROS: positive for - fever, chills, fatigue and malaise  Cardiovascular ROS: negative for - chest pain, edema, murmur, orthopnea, palpitations or paroxysmal nocturnal dyspnea  Gastrointestinal ROS: no abdominal pain, change in bowel habits, or black or bloody stools  Dermatological ROS: negative for - pruritus, rash or skin lesion changes     Past Medical History  Past Medical History:   Diagnosis Date    Arthritis     Asthma     Chronic kidney disease     deformed right  kidney    Diabetes ([facility] [address]     Hypertension     Migraines     PUD (peptic ulcer disease)     H/O, no meds at this time 2016    Seizures ([facility] [address]     Unspecified sleep apnea     uses cpap       Past Surgical History  Past Surgical History:   Procedure Laterality Date    HX APPENDECTOMY      HX GYN      hysterectomy       Family History  Family History   Problem Relation Age of Onset    Malignant Hyperthermia Neg Hx     Pseudocholinesterase Deficiency Neg Hx     Delayed Awakening Neg Hx     Post-op Nausea/Vomiting Neg Hx     Emergence Delirium Neg Hx     Post-op Cognitive Dysfunction Neg Hx     Other Neg Hx        Social History  Social History     Tobacco Use    Smoking status: Never Smoker    Smokeless tobacco: Never Used   Substance Use Topics    Alcohol use: No    Drug use: No        Medications  Current Facility-Administered Medications   Medication Dose Route Frequency    methylPREDNISolone (PF) (Solu-MEDROL) injection 60 mg  60 mg IntraVENous Q8H    albuterol (PROVENTIL VENTOLIN) nebulizer solution 2.5 mg  2.5 mg Nebulization Q4H PRN    benzonatate (TESSALON) capsule 100 mg  100 mg Oral TID PRN    ketorolac (TORADOL) injection 15 mg  15 mg IntraVENous Q6H PRN    naloxone (NARCAN) injection 0.4 mg  0.4 mg IntraVENous PRN    diphenhydrAMINE (BENADRYL) injection 12.5 mg  12.5 mg IntraVENous Q4H PRN    ondansetron (ZOFRAN) injection 4 mg  4 mg IntraVENous Q4H PRN    heparin (porcine) injection 5,000 Units  5,000 Units SubCUTAneous Q8H    guaiFENesin ER (MUCINEX) tablet 600 mg  600 mg Oral Q12H    arformoteroL (BROVANA) neb solution 15 mcg  15 mcg Nebulization BID RT    budesonide (PULMICORT) 500 mcg/2 ml nebulizer suspension  500 mcg Nebulization BID RT    levoFLOXacin (LEVAQUIN) 500 mg in D5W IVPB  500 mg IntraVENous Q24H    aspirin delayed-release tablet 81 mg  81 mg Oral DAILY    guaiFENesin-codeine (ROBITUSSIN AC) 100-10 mg/5 mL solution 5 mL  5 mL Oral TID PRN    levETIRAcetam (KEPPRA) tablet 500 mg  500 mg Oral BID    montelukast (SINGULAIR) tablet 10 mg  10 mg Oral DAILY    dilTIAZem CD (CARDIZEM CD) capsule 180 mg  180 mg Oral DAILY    Saccharomyces boulardii (FLORASTOR) capsule 500 mg  500 mg Oral BID    insulin lispro (HUMALOG) injection   SubCUTAneous AC&HS    glucose chewable tablet 16 g  4 Tab Oral PRN    glucagon (GLUCAGEN) injection 1 mg  1 mg IntraMUSCular PRN     Prior to Admission medications    Medication Sig Start Date End Date Taking? Authorizing Provider   aspirin delayed-release 81 mg tablet Take 81 mg by mouth daily. Yes Provider, Historical   CASCARA SAGRADA PO Take  by mouth. Other, MD Kirk   dilTIAZem XR (DILACOR XR) 180 mg XR capsule Take  by mouth daily. Other, MD Kirk   albuterol (PROVENTIL HFA, VENTOLIN HFA, PROAIR HFA) 90 mcg/actuation inhaler Take 2 Puffs by inhalation every four (4) hours as needed for Wheezing or Shortness of Breath. 1/9/19   Maeve Rosa PA-C   inhalational spacing device 1 Each by Does Not Apply route as needed. Please dispense one adult spacer to be used with albuterol HFA. 1/9/19   Maeve Rosa PA-C   predniSONE (STERAPRED DS) 10 mg dose pack Take with food. 1/9/19   Billy Ramires PA-C   albuterol (ACCUNEB) 1.25 mg/3 mL nebu Take 3 mL by inhalation every four (4) hours as needed (wheezing). 1/9/19   Billy Ramires PA-C   ondansetron (ZOFRAN ODT) 4 mg disintegrating tablet Take 1 Tab by mouth every eight (8) hours as needed for Nausea. 1/9/19   Milton Ramires PA-C   guaiFENesin-codeine (ROBITUSSIN AC) 100-10 mg/5 mL solution Take 5 mL by mouth three (3) times daily as needed for Cough.  Max Daily Amount: 15 mL. 1/9/19   Billy Ramires PA-C   clonazePAM (KLONOPIN) 0.5 mg tablet Take 1 Tab by mouth three (3) times daily. Max Daily Amount: 1.5 mg. 3/26/17   Vannesa Parkinson MD   dronabinol (MARINOL) 2.5 mg capsule Take 1 Cap by mouth Before breakfast and dinner. Max Daily Amount: 5 mg. 3/26/17   Vannesa Parkinson MD   montelukast (SINGULAIR) 10 mg tablet Take 10 mg by mouth daily. Provider, Historical   naproxen (NAPROSYN) 500 mg tablet Take 500 mg by mouth two (2) times daily as needed. Provider, Historical   raNITIdine (ZANTAC) 150 mg tablet Take 150 mg by mouth two (2) times a day. Velvet Cordova MD   conjugated estrogens (PREMARIN) 0.9 mg tablet Take 0.9 mg by mouth daily. Velvet Cordova MD   acetaminophen (TYLENOL) 500 mg tablet Take 1,000 mg by mouth every six (6) hours as needed for Pain or Fever. Velvet Cordova MD   fluticasone-salmeterol (ADVAIR) 250-50 mcg/dose diskus inhaler Take 1 Puff by inhalation two (2) times a day. 11/9/16   Shakira Johnson MD   escitalopram oxalate (LEXAPRO) 10 mg tablet Take 15 mg by mouth daily. Provider, Historical   levETIRAcetam (KEPPRA) 500 mg tablet Take 500 mg by mouth two (2) times a day. Kirk Ryan MD   SUMAtriptan succinate 6 mg/0.5 mL kit 6 mg by SubCUTAneous route once as needed for Migraine. Kirk Ryan MD   losartan-hydrochlorothiazide (HYZAAR) 100-25 mg per tablet Take 1 Tab by mouth daily. Kirk Ryan MD   OXcarbazepine (TRILEPTAL) 300 mg tablet Take 300 mg by mouth two (2) times a day. Kirk Ryan MD       Allergy  Allergies   Allergen Reactions    Morphine Itching     Can tolerate if premed w/ benadryl    Pertussis Vaccine,Adsorbed Swelling     Swelling at injection site    Vicodin [Hydrocodone-Acetaminophen] Itching     Can tolerate if premed w/ benadryl       Physical Exam:   Vital Signs:    Blood pressure 157/79, pulse 66, temperature 97.6 °F (36.4 °C), resp. rate 22, height 5' 2\" (1.575 m), weight 77.1 kg (170 lb), SpO2 95 %, not currently breastfeeding. Body mass index is 31.09 kg/m².     O2 Device: Room air       Temp (24hrs), Av °F (36.7 °C), Min:97.4 °F (36.3 °C), Max:98.6 °F (37 °C)       Intake/Output:   Last shift:      701 - 1900  In: 5000 [I.V.:5000]  Out: -   Last 3 shifts: 02/15 1901 -  0700  In: 2110 [P.O.:650;  I.V.:1460]  Out: 1250 [Urine:1250]    Intake/Output Summary (Last 24 hours) at 2020 1737  Last data filed at 2020 0825  Gross per 24 hour   Intake 5760 ml   Output 500 ml   Net 5260 ml      General: in no respiratory distress, able to finish full sentences, cooperative, appears stated age, on RA  HEENT: PERRLA, throat normal without erythema or exudate  Neck: No abnormally enlarged lymph nodes or thyroid, supple  Chest: normal, obese chest wall  Lungs: moderate air entry, mild rhonchi RT base; no wheezes bilaterally, normal percussion anterior chestwall bilaterally, no tenderness/ rash  Heart: Regular rate and rhythm, S1S2 present or without murmur or extra heart sounds  Abdomen: obese, bowel sounds normoactive, tympanic, soft without significant tenderness, masses, organomegaly or guarding  Extremity: negative for edema, cyanosis, clubbing  Neuro: alert, oriented x 3, no defects noted in general exam.  Skin: Skin color, texture, turgor normal. No rashes or lesions    Labs Reviewed:  Recent Results (from the past 24 hour(s))   GLUCOSE, POC    Collection Time: 20 10:07 PM   Result Value Ref Range    Glucose (POC) 125 (H) 70 - 588 mg/dL   METABOLIC PANEL, BASIC    Collection Time: 20 12:33 AM   Result Value Ref Range    Sodium 143 136 - 145 mmol/L    Potassium 4.0 3.5 - 5.5 mmol/L    Chloride 111 100 - 111 mmol/L    CO2 26 21 - 32 mmol/L    Anion gap 6 3.0 - 18 mmol/L    Glucose 137 (H) 74 - 99 mg/dL    BUN 24 (H) 7.0 - 18 MG/DL    Creatinine 1.13 0.6 - 1.3 MG/DL    BUN/Creatinine ratio 21 (H) 12 - 20      GFR est AA 60 (L) >60 ml/min/1.73m2    GFR est non-AA 49 (L) >60 ml/min/1.73m2    Calcium 7.6 (L) 8.5 - 10.1 MG/DL   MAGNESIUM    Collection Time: 02/17/20 12:33 AM   Result Value Ref Range    Magnesium 2.1 1.6 - 2.6 mg/dL   CBC WITH AUTOMATED DIFF    Collection Time: 02/17/20 12:33 AM   Result Value Ref Range    WBC 16.3 (H) 4.6 - 13.2 K/uL    RBC 3.43 (L) 4.20 - 5.30 M/uL    HGB 10.3 (L) 12.0 - 16.0 g/dL    HCT 31.6 (L) 35.0 - 45.0 %    MCV 92.1 74.0 - 97.0 FL    MCH 30.0 24.0 - 34.0 PG    MCHC 32.6 31.0 - 37.0 g/dL    RDW 12.8 11.6 - 14.5 %    PLATELET 091 108 - 353 K/uL    MPV 10.5 9.2 - 11.8 FL    NEUTROPHILS 95 (H) 40 - 73 %    LYMPHOCYTES 4 (L) 21 - 52 %    MONOCYTES 1 (L) 3 - 10 %    EOSINOPHILS 0 0 - 5 %    BASOPHILS 0 0 - 2 %    ABS. NEUTROPHILS 15.5 (H) 1.8 - 8.0 K/UL    ABS. LYMPHOCYTES 0.6 (L) 0.9 - 3.6 K/UL    ABS. MONOCYTES 0.2 0.05 - 1.2 K/UL    ABS. EOSINOPHILS 0.0 0.0 - 0.4 K/UL    ABS.  BASOPHILS 0.0 0.0 - 0.1 K/UL    DF AUTOMATED     GLUCOSE, POC    Collection Time: 02/17/20  7:57 AM   Result Value Ref Range    Glucose (POC) 142 (H) 70 - 110 mg/dL   GLUCOSE, POC    Collection Time: 02/17/20 11:43 AM   Result Value Ref Range    Glucose (POC) 137 (H) 70 - 110 mg/dL   GLUCOSE, POC    Collection Time: 02/17/20  4:06 PM   Result Value Ref Range    Glucose (POC) 125 (H) 70 - 110 mg/dL           Recent Labs     02/15/20  0921   FIO2I 21       All Micro Results     Procedure Component Value Units Date/Time    CULTURE, RESPIRATORY/SPUTUM/BRONCH Vallery Peed STAIN [[id number]] Collected:  02/15/20 0900    Order Status:  Completed Specimen:  Sputum Updated:  02/17/20 1113     Special Requests: NO SPECIAL REQUESTS        GRAM STAIN 1+ WBCS SEEN         1+ EPITHELIAL CELLS SEEN         1+ BUDDING YEAST         FEW GRAM POSITIVE RODS        Culture result:       LIGHT NORMAL RESPIRATORY CHOLO          CULTURE, BLOOD [[id number]] Collected:  02/14/20 1553    Order Status:  Completed Specimen:  Blood Updated:  02/17/20 0705     Special Requests: NO SPECIAL REQUESTS        Culture result: NO GROWTH 3 DAYS       CULTURE, BLOOD [[id number]] Collected:  02/14/20 1612    Order Status:  Completed Specimen:  Blood Updated:  02/17/20 0705     Special Requests: NO SPECIAL REQUESTS        Culture result: NO GROWTH 3 DAYS       RESPIRATORY VIRAL PANEL, PCR [[id number]] Collected:  02/15/20 1510    Order Status:  Completed Specimen:  Sputum Updated:  02/15/20 1533    LEGIONELLA PNEUMOPHILA Rosita Mercedes [[id number]] Collected:  02/15/20 0445    Order Status:  Completed Specimen:  Urine, random Updated:  02/15/20 1221     Legionella Ag, urine NEGATIVE        STREP PNEUMO AG, URINE [[id number]] Collected:  02/15/20 0445    Order Status:  Completed Specimen:  Urine, random Updated:  02/15/20 1221     Strep pneumo Ag, urine NEGATIVE        INFLUENZA A & B AG (RAPID TEST) [[id number]] Collected:  02/14/20 1607    Order Status:  Completed Specimen:  Nasopharyngeal from Nasal washing Updated:  02/14/20 1628     Influenza A Antigen NEGATIVE         Comment: A negative result does not exclude influenza virus infection, seasonal or H1N1 due to suboptimal sensitivity. If influenza is circulating in your community, a diagnosis of influenza should be considered based on a patients clinical presentation and empiric antiviral treatment should be considered, if indicated. Influenza B Antigen NEGATIVE              Imaging:  [x]I have personally reviewed the patients chest radiographs images and report with the patient  Results from Hospital Encounter encounter on 02/14/20   XR CHEST PA LAT    Narrative EXAM: XR CHEST PA LAT    CLINICAL INDICATION/HISTORY: cough  -Additional: None    COMPARISON: Several prior exams, most recently 1/9/2019    TECHNIQUE: PA and lateral views of the chest    _______________    FINDINGS:    HEART AND MEDIASTINUM: Cardiac size and mediastinal contours are within normal  limits    LUNGS AND PLEURAL SPACES: Lungs are mildly underexpanded. Asymmetric area of  opacity at the medial right lung base. No pneumothorax or pleural effusion.      BONY THORAX AND SOFT TISSUES: No acute osseous abnormality    _______________      Impression IMPRESSION:    Mildly underexpanded lungs with likely infiltrate in the medial right lung base. No accompanying pleural effusion. Recommend radiographic follow-up to document  expected clinical resolution in 4-6 weeks' time. Results from [place] encounter on 02/14/20   CT CHEST WO CONT    Narrative EXAM: CT Chest     INDICATION: Patient currently admitted for bronchitis, not improving despite  several weeks of antibiotics. .    COMPARISON: Abdominal/pelvic CT 1/14/2016; prior chest radiographs 2/14/2020. Yvette Jessica TECHNIQUE: Axial CT imaging from the thoracic inlet through the diaphragm  without intravenous contrast. Multiplanar reformations were generated. One or more dose reduction techniques were used on this CT: automated exposure  control, adjustment of the mAs and/or kVp according to patient size, and  iterative reconstruction techniques. The specific techniques used on this CT  exam have been documented in the patient's electronic medical record. Digital  Imaging and Communications in Medicine (DICOM) format image data are available  to nonaffiliated external healthcare facilities or entities on a secure, media  free, reciprocally searchable basis with patient authorization for at least a  12-month period after this study. _______________    FINDINGS:    LUNGS: Alveolar consolidation and groundglass density present within the medial  right lower lobe with additional faint centrilobular nodules present within the  periphery of the right lower lobe. The remaining right lung is clear. Minor  basilar changes of atelectasis at the dependent left lower lobe. PLEURA: No pneumothorax or pleural effusion. AIRWAY: Unremarkable. MEDIASTINUM: Included thyroid gland is unremarkable. Normal cardiac size. No  pericardial effusion. Thoracic aorta is of normal course and caliber.     LYMPH NODES: Enlarged right supraclavicular lymph node (series 2, image 9)  measures approximately 1.5 cm in short axis diameter. Left supraclavicular alex  basin appears within normal limits. No axillary or mediastinal adenopathy. No  enlarged hilar lymph nodes. UPPER ABDOMEN: Included upper abdomen demonstrates no acute abnormality. Small  hiatal hernia. OSSEOUS STRUCTURES: No acute osseous abnormality. No suspicious or blastic  osseous lesions. OTHER:    _______________      Impression IMPRESSION:      1. Right lower lobe pneumonia without evidence of accompanying pleural effusion. As previously, recommend radiographic follow-up to document expected clinical  resolution in 4-6 weeks' time following appropriate antibiotic treatment. 2. Indeterminate right sternoclavicular lymph nodes noted enlargement. Unclear  if this is a reactive phenomenon versus manifestation of separate disease  process. Clinical follow-up recommended. If lymph node enlargement persists,  consider dedicated imaging and/or tissue sampling. [x]See my orders for details    My assessment, plan of care, findings, medications, side effects etc were discussed with:  [x]nursing []PT/OT    [x]respiratory therapy [x]Dr. Alana Main [x]Patient     [x]High complexity decision making performed and > 50% time spent in face to face consultation.     Karl Danielle MD  2/17/2020

## 2020-02-17 NOTE — PROGRESS NOTES
CM met with pt at bedside to discuss transition of care needs. Pt was ambulating in the room independently. Pt does have a nebulizer at home. Pt does not have any other DME. Pt would benefit from a [facility] and physician follow up upon discharge. CM to continue to follow and assist.    Care Management Interventions  PCP Verified by CM: Hubert Ferrer (Saira Morrow [phone]))  Mode of Transport at Discharge:  Other (see comment)(Family)  Transition of Care Consult (CM Consult): Discharge Planning  Health Maintenance Reviewed: Yes  Current Support Network: Lives with Spouse  Confirm Follow Up Transport: Self  The Plan for Transition of Care is Related to the Following Treatment Goals : Home with physician follow up vs [facility] and physician follow up   Discharge Location  Discharge Placement: Home with family assistance(vs [facility] and physician follow up)

## 2020-02-17 NOTE — PROGRESS NOTES
Hospitalist Progress Note-critical care note     Patient: Ariane Araujo MRN: [id number]  CSN: [id number]    YOB: 1960  Age: 63 y.o. Sex: female    DOA: 2/14/2020 LOS:  LOS: 3 days            Chief complaint: asthma cap . Assessment/Plan         Hospital Problems  Never Reviewed          Codes Class Noted POA    Failure of outpatient treatment ICD-10-CM: Z78.9  ICD-9-CM: V49.89  2/14/2020 Yes        Moderate persistent asthma with acute exacerbation ICD-10-CM: J45.41  ICD-9-CM: 493.92  2/14/2020 Yes        CAP (community acquired pneumonia) ICD-10-CM: J18.9  ICD-9-CM: 609  2/14/2020 Unknown        Seizures ([facility] [address] ICD-10-CM: R56.9  ICD-9-CM: 780.39  2/14/2020 Unknown        HTN (hypertension) ICD-10-CM: I10  ICD-9-CM: 401.9  11/1/2016 Yes        * (Principal) Community acquired pneumonia of right lower lobe of lung ([facility] ICD-10-CM: J18.1  ICD-9-CM: 483  11/1/2016 Unknown            Asthma exacerbation ,moderate persistent , mild wheezing  Iv steroid -weaning now   , breathing treatment with bronchodilator   symptom treatment   ssi for glucose control       Cap-failed out -pt treatment ,  Pneumonia-will d,c vanc and zosyn, continue levaquin to complete the course of treatment   - legionella antigen urine/strep pneumonia urine negative , viral panel pending    influenza rapid screen negative    respiratory culture with gram stain one budding yeast-will f/u with final cx .   -ct chest rt side pna     Elevated lactic acidosis  Resolved      HTN, accelerated  Continue home medication.      seizure continue keppra   Seizure precaution     Subjective: wheezing is better   rn : no acute issue   Will narrow abx, weaning iv steroid   Disposition :1-2 days   Review of systems:    General: No fevers or chills. Cardiovascular: No chest pain or pressure. No palpitations. Pulmonary: coughs better  ,  shortness of breath-improving   Gastrointestinal: No nausea, vomiting.      Vital signs/Intake and Output:  Visit Vitals  /74 (BP 1 Location: Left arm, BP Patient Position: At rest)   Pulse 94   Temp 98 °F (36.7 °C)   Resp 20   Ht 5' 2\" (1.575 m)   Wt 77.1 kg (170 lb)   SpO2 94%   Breastfeeding No   BMI 31.09 kg/m²     Current Shift:  02/17 0701 - 02/17 1900  In: 5000 [I.V.:5000]  Out: -   Last three shifts:  02/15 1901 - 02/17 0700  In: 2110 [P.O.:650; I.V.:1460]  Out: 1250 [Urine:1250]    Physical Exam:  General: WD, WN. Alert, cooperative, no acute distress    HEENT: NC, Atraumatic. PERRLA, anicteric sclerae. Lungs: Mild  Wheezing-improving , minimal Rhonchi/Rales  Heart:  Regular  rhythm,  No murmur, No Rubs, No Gallops  Abdomen: Soft, Non distended, Non tender.  +Bowel sounds,   Extremities: No c/c/e  Psych:   Not anxious or agitated. Neurologic:  No acute neurological deficit. Labs: Results:       Chemistry Recent Labs     02/17/20  0033 02/16/20  0235 02/15/20  0155 02/14/20  1553   * 135* 150* 99    143 141 137   K 4.0 4.0 4.1 3.7    111 109 101   CO2 26 27 26 31   BUN 24* 16 16 18   CREA 1.13 1.01 1.07 1.22   CA 7.6* 7.6* 7.5* 8.7   AGAP 6 5 6 5   BUCR 21* 16 15 15   AP  --   --   --  85   TP  --   --   --  7.3   ALB  --   --   --  3.7   GLOB  --   --   --  3.6   AGRAT  --   --   --  1.0      CBC w/Diff Recent Labs     02/17/20 0033 02/16/20  0235 02/15/20  0155   WBC 16.3* 15.6* 5.1   RBC 3.43* 3.48* 3.26*   HGB 10.3* 10.6* 9.9*   HCT 31.6* 32.3* 30.4*    308 249   GRANS 95* 94* 92*   LYMPH 4* 4* 7*   EOS 0 0 0      Cardiac Enzymes No results for input(s): CPK, CKND1, YAMILKA in the last 72 hours. No lab exists for component: CKRMB, TROIP   Coagulation No results for input(s): PTP, INR, APTT, INREXT, INREXT in the last 72 hours. Lipid Panel No results found for: CHOL, CHOLPOCT, CHOLX, CHLST, CHOLV, 823040, HDL, HDLP, LDL, LDLC, DLDLP, 096071, VLDLC, VLDL, TGLX, TRIGL, TRIGP, TGLPOCT, CHHD, CHHDX   BNP No results for input(s): BNPP in the last 72 hours. Liver Enzymes Recent Labs     02/14/20  1553   TP 7.3   ALB 3.7   AP 85   SGOT 19      Thyroid Studies No results found for: T4, T3U, TSH, TSHEXT, TSHEXT     Procedures/imaging: see electronic medical records for all procedures/Xrays and details which were not copied into this note but were reviewed prior to creation of Ally Jordan MD

## 2020-02-17 NOTE — PROGRESS NOTES
Problem: Pain  Goal: *Control of Pain  Outcome: Progressing Towards Goal     Problem: Falls - Risk of  Goal: *Absence of Falls  Description  Document Maia Bob Fall Risk and appropriate interventions in the flowsheet.   Outcome: Progressing Towards Goal  Note: Fall Risk Interventions:  Mobility Interventions: Assess mobility with egress test, Patient to call before getting OOB    Mentation Interventions: Adequate sleep, hydration, pain control    Medication Interventions: Patient to call before getting OOB, Teach patient to arise slowly    Elimination Interventions: Call light in reach

## 2020-02-17 NOTE — PROGRESS NOTES
Bedside and Verbal shift change report given to Riya Quintana RN (oncoming nurse) by Kalee Pat RN (offgoing nurse). Report included the following information SBAR, Kardex, Intake/Output and MAR. Patient A/OX4 Patient in bed resting quietly. No complaints of pain or nausea at this time. 1019-Patient requested something for pain. Administered Toradol 15MG IV. Pain 7/10. Bedside and Verbal shift change report given to Adina Tao RN (oncoming nurse) by Riya Quintana RN (offgoing nurse). Report included the following information SBAR, Kardex, Intake/Output and MAR.

## 2020-02-17 NOTE — PROGRESS NOTES
Resting quietly in bed, no complaints for now, non-productive cough. Neb treatment with Albuterol every 4 hrs prn. Up to bathroom with assist.    0310 Robitussin AC, Toradol and Zofran given as ordered prn. Resp therapist paged for Albuterol neb treatment    0720 Bedside and Verbal shift change report given to Jacki Gunter RN (oncoming nurse) by Cash Sims RN   (offgoing nurse). Report included the following information SBAR, Kardex, Intake/Output, MAR and Recent Results.

## 2020-02-18 ENCOUNTER — HOME HEALTH ADMISSION (OUTPATIENT)
Dept: HOME HEALTH SERVICES | Facility: HOME HEALTH | Age: 60
End: 2020-02-18

## 2020-02-18 VITALS
HEIGHT: 62 IN | WEIGHT: 170 LBS | DIASTOLIC BLOOD PRESSURE: 77 MMHG | SYSTOLIC BLOOD PRESSURE: 149 MMHG | HEART RATE: 60 BPM | BODY MASS INDEX: 31.28 KG/M2 | TEMPERATURE: 98.3 F | RESPIRATION RATE: 18 BRPM | OXYGEN SATURATION: 100 %

## 2020-02-18 LAB
ANION GAP SERPL CALC-SCNC: 6 MMOL/L (ref 3–18)
BACTERIA SPEC CULT: NORMAL
BUN SERPL-MCNC: 27 MG/DL (ref 7–18)
BUN/CREAT SERPL: 23 (ref 12–20)
CALCIUM SERPL-MCNC: 7.7 MG/DL (ref 8.5–10.1)
CHLORIDE SERPL-SCNC: 111 MMOL/L (ref 100–111)
CO2 SERPL-SCNC: 27 MMOL/L (ref 21–32)
CREAT SERPL-MCNC: 1.16 MG/DL (ref 0.6–1.3)
GLUCOSE BLD STRIP.AUTO-MCNC: 142 MG/DL (ref 70–110)
GLUCOSE BLD STRIP.AUTO-MCNC: 148 MG/DL (ref 70–110)
GLUCOSE SERPL-MCNC: 149 MG/DL (ref 74–99)
GRAM STN SPEC: NORMAL
MAGNESIUM SERPL-MCNC: 2.3 MG/DL (ref 1.6–2.6)
POTASSIUM SERPL-SCNC: 4 MMOL/L (ref 3.5–5.5)
SERVICE CMNT-IMP: NORMAL
SODIUM SERPL-SCNC: 144 MMOL/L (ref 136–145)

## 2020-02-18 PROCEDURE — 74011250637 HC RX REV CODE- 250/637: Performed by: EMERGENCY MEDICINE

## 2020-02-18 PROCEDURE — 83735 ASSAY OF MAGNESIUM: CPT

## 2020-02-18 PROCEDURE — 74011000250 HC RX REV CODE- 250: Performed by: HOSPITALIST

## 2020-02-18 PROCEDURE — 36415 COLL VENOUS BLD VENIPUNCTURE: CPT

## 2020-02-18 PROCEDURE — 94760 N-INVAS EAR/PLS OXIMETRY 1: CPT

## 2020-02-18 PROCEDURE — 82962 GLUCOSE BLOOD TEST: CPT

## 2020-02-18 PROCEDURE — 94640 AIRWAY INHALATION TREATMENT: CPT

## 2020-02-18 PROCEDURE — 74011250637 HC RX REV CODE- 250/637: Performed by: HOSPITALIST

## 2020-02-18 PROCEDURE — 74011250636 HC RX REV CODE- 250/636: Performed by: HOSPITALIST

## 2020-02-18 PROCEDURE — 80048 BASIC METABOLIC PNL TOTAL CA: CPT

## 2020-02-18 PROCEDURE — 74011000250 HC RX REV CODE- 250: Performed by: INTERNAL MEDICINE

## 2020-02-18 RX ORDER — GUAIFENESIN 600 MG/1
600 TABLET, EXTENDED RELEASE ORAL EVERY 12 HOURS
Qty: 6 TAB | Refills: 0 | Status: SHIPPED | OUTPATIENT
Start: 2020-02-18 | End: 2020-02-21

## 2020-02-18 RX ORDER — SAME BUTANEDISULFONATE/BETAINE 400-600 MG
500 POWDER IN PACKET (EA) ORAL 2 TIMES DAILY
Qty: 20 CAP | Refills: 0 | Status: SHIPPED | OUTPATIENT
Start: 2020-02-18 | End: 2020-02-23

## 2020-02-18 RX ORDER — LEVOFLOXACIN 500 MG/1
500 TABLET, FILM COATED ORAL DAILY
Qty: 5 TAB | Refills: 0 | Status: SHIPPED | OUTPATIENT
Start: 2020-02-18 | End: 2020-02-23

## 2020-02-18 RX ORDER — PREDNISONE 5 MG/1
TABLET ORAL
Qty: 21 TAB | Refills: 0 | Status: SHIPPED | OUTPATIENT
Start: 2020-02-18 | End: 2021-10-20

## 2020-02-18 RX ADMIN — ONDANSETRON 4 MG: 2 INJECTION INTRAMUSCULAR; INTRAVENOUS at 08:40

## 2020-02-18 RX ADMIN — BENZONATATE 100 MG: 100 CAPSULE ORAL at 08:40

## 2020-02-18 RX ADMIN — ARFORMOTEROL TARTRATE 15 MCG: 15 SOLUTION RESPIRATORY (INHALATION) at 07:10

## 2020-02-18 RX ADMIN — HEPARIN SODIUM 5000 UNITS: 5000 INJECTION INTRAVENOUS; SUBCUTANEOUS at 02:34

## 2020-02-18 RX ADMIN — LEVETIRACETAM 500 MG: 500 TABLET ORAL at 08:39

## 2020-02-18 RX ADMIN — MONTELUKAST 10 MG: 10 TABLET, FILM COATED ORAL at 08:40

## 2020-02-18 RX ADMIN — DILTIAZEM HYDROCHLORIDE 180 MG: 180 CAPSULE, COATED, EXTENDED RELEASE ORAL at 08:40

## 2020-02-18 RX ADMIN — IPRATROPIUM BROMIDE 0.5 MG: 0.5 SOLUTION RESPIRATORY (INHALATION) at 07:10

## 2020-02-18 RX ADMIN — METHYLPREDNISOLONE SODIUM SUCCINATE 60 MG: 125 INJECTION, POWDER, FOR SOLUTION INTRAMUSCULAR; INTRAVENOUS at 08:42

## 2020-02-18 RX ADMIN — BUDESONIDE 500 MCG: 0.5 INHALANT RESPIRATORY (INHALATION) at 07:10

## 2020-02-18 RX ADMIN — ASPIRIN 81 MG: 81 TABLET, COATED ORAL at 08:40

## 2020-02-18 RX ADMIN — Medication 500 MG: at 08:40

## 2020-02-18 RX ADMIN — GUAIFENESIN 600 MG: 600 TABLET, EXTENDED RELEASE ORAL at 08:40

## 2020-02-18 NOTE — PROGRESS NOTES
7864- Bedside and Verbal shift change report given to Chana Narvaez (oncoming nurse) by Dagmar Liu (offgoing nurse). Report included the following information SBAR, Kardex and MAR.

## 2020-02-18 NOTE — DISCHARGE INSTRUCTIONS
Patient Education        Learning About Asthma Triggers  What are asthma triggers? When you have asthma, certain things can make your symptoms worse. These are called triggers. Learn what triggers an asthma attack for you, and avoid the triggers when you can. Common triggers include colds, smoke, air pollution, dust, pollen, pets, stress, and cold air. How do asthma triggers affect you? Triggers can make it harder for your lungs to work as they should. They can lead to sudden breathing problems and other symptoms. When you are around a trigger, an asthma attack is more likely. If your symptoms are severe, you may need emergency treatment or have to go to the hospital for treatment. What can you do to avoid triggers? The first thing is to know your triggers. When you are having symptoms, note the things around you that might be causing them. Then look for patterns that may be triggering your symptoms. Record your triggers on a piece of paper or in an asthma diary. When you have your list of possible triggers, work with your doctor to find ways to avoid them. Avoid colds and flu. Get a pneumococcal vaccine shot. If you have had one before, ask your doctor whether you need a second dose. Get a flu vaccine every year, as soon as it's available. If you must be around people with colds or the flu, wash your hands often. Here are some ways to avoid a few common triggers. · Do not smoke or allow others to smoke around you. If you need help quitting, talk to your doctor about stop-smoking programs and medicines. These can increase your chances of quitting for good. · If there is a lot of pollution, pollen, or dust outside, stay at home and keep your windows closed. Use an air conditioner or air filter in your home. Check your local weather report or newspaper for air quality and pollen reports. What else should you know? · Take your controller medicine every day, not just when you have symptoms.  It helps prevent problems before they occur. · Your doctor may suggest that you check how well your lungs are working by measuring your peak expiratory flow (PEF) throughout the day. Your PEF may drop when you are near things that trigger symptoms. Where can you learn more? Go to http://paul-juan manuel.info/. Enter D470 in the search box to learn more about \"Learning About Asthma Triggers. \"  Current as of: June 9, 2019  Content Version: 12.2  © 9423-0619 [employer]. Care instructions adapted under license by [employer] (which disclaims liability or warranty for this information). If you have questions about a medical condition or this instruction, always ask your healthcare professional. [address] any warranty or liability for your use of this information.

## 2020-02-18 NOTE — PROGRESS NOTES
D/C Plan: [facility] and physician follow up 2/18/2020    Noted pt is to be discharged today. Provider has indicated pt would benefit from an [facility] visit and physician follow up. Pt's  will transport pt home today. No other transition of care needs have been identified. Care Management Interventions  PCP Verified by CM: Viola Romero (Saira Morrow [phone]))  Mode of Transport at Discharge:  Other (see comment)(Family)  Transition of Care Consult (CM Consult): Discharge Planning  Health Maintenance Reviewed: Yes  Current Support Network: Lives with Spouse  Confirm Follow Up Transport: Self  The Plan for Transition of Care is Related to the Following Treatment Goals : Home with physician follow up vs [facility] and physician follow up   Discharge Location  Discharge Placement: Home with family assistance(vs [facility] and physician follow up)

## 2020-02-18 NOTE — ROUTINE PROCESS
Bedside and Verbal shift change report given to Nancy Echols RN by [facility]. Report included the following information SBAR, Kardex, OR Summary, Intake/Output and MAR.

## 2020-02-18 NOTE — PROGRESS NOTES
0 - Bedside report received from [place], [place]. Patient in bed. Pain 0/10.     2158 - Patient in bed at this time. IV to R AC  intact and patent. + CMS. Pt A & O x 4. LS clear, on RA. Abdomen soft, NT and ND. + BS to all 4 quadrants. Denies nausea. Pain 0/10. Call light within reach. Pt had uneventful shift. Uses IS every hour while awake. Pt ambulated without assistance. No issues/concerns at this time.  Call bell within reach none

## 2020-02-18 NOTE — DISCHARGE SUMMARY
Discharge Summary    Patient: Sae Kat MRN: [id number]  CSN: [id number]    YOB: 1960  Age: 63 y.o. Sex: female    DOA: 2/14/2020 LOS:  LOS: 4 days   Discharge Date:      Primary Care Provider:  Other, MD Kirk    Admission Diagnoses: CAP (community acquired pneumonia) [J18.9]    Discharge Diagnoses:    Hospital Problems  Never Reviewed          Codes Class Noted POA    Failure of outpatient treatment ICD-10-CM: Z78.9  ICD-9-CM: V49.89  2/14/2020 Yes        Moderate persistent asthma with acute exacerbation ICD-10-CM: J45.41  ICD-9-CM: 493.92  2/14/2020 Yes        CAP (community acquired pneumonia) ICD-10-CM: J18.9  ICD-9-CM: 291  2/14/2020 Unknown        Seizures ([facility] [address] ICD-10-CM: R56.9  ICD-9-CM: 780.39  2/14/2020 Unknown        HTN (hypertension) ICD-10-CM: I10  ICD-9-CM: 401.9  11/1/2016 Yes        * (Principal) Community acquired pneumonia of right lower lobe of lung ([facility] [address] ICD-10-CM: J18.1  ICD-9-CM: 965  11/1/2016 Unknown              Discharge Condition: stable     Discharge Medications:     Current Discharge Medication List      START taking these medications    Details   guaiFENesin ER (MUCINEX) 600 mg ER tablet Take 1 Tab by mouth every twelve (12) hours for 3 days. Qty: 6 Tab, Refills: 0      Saccharomyces boulardii (FLORASTOR) 250 mg capsule Take 2 Caps by mouth two (2) times a day for 5 days. Qty: 20 Cap, Refills: 0      levoFLOXacin (LEVAQUIN) 500 mg tablet Take 1 Tab by mouth daily for 5 days. Qty: 5 Tab, Refills: 0         CONTINUE these medications which have CHANGED    Details   predniSONE (STERAPRED) 5 mg dose pack See administration instruction per 5mg dose pack  Qty: 21 Tab, Refills: 0         CONTINUE these medications which have NOT CHANGED    Details   aspirin delayed-release 81 mg tablet Take 81 mg by mouth daily. CASCARA SAGRADA PO Take  by mouth. dilTIAZem XR (DILACOR XR) 180 mg XR capsule Take  by mouth daily.       albuterol (PROVENTIL HFA, VENTOLIN HFA, PROAIR HFA) 90 mcg/actuation inhaler Take 2 Puffs by inhalation every four (4) hours as needed for Wheezing or Shortness of Breath. Qty: 1 Inhaler, Refills: 1      inhalational spacing device 1 Each by Does Not Apply route as needed. Please dispense one adult spacer to be used with albuterol HFA. Qty: 1 Device, Refills: 0      albuterol (ACCUNEB) 1.25 mg/3 mL nebu Take 3 mL by inhalation every four (4) hours as needed (wheezing). Qty: 25 Each, Refills: 0      ondansetron (ZOFRAN ODT) 4 mg disintegrating tablet Take 1 Tab by mouth every eight (8) hours as needed for Nausea. Qty: 12 Tab, Refills: 0      clonazePAM (KLONOPIN) 0.5 mg tablet Take 1 Tab by mouth three (3) times daily. Max Daily Amount: 1.5 mg.  Qty: 20 Tab, Refills: 0      dronabinol (MARINOL) 2.5 mg capsule Take 1 Cap by mouth Before breakfast and dinner. Max Daily Amount: 5 mg. Qty: 20 Cap, Refills: 0      montelukast (SINGULAIR) 10 mg tablet Take 10 mg by mouth daily. naproxen (NAPROSYN) 500 mg tablet Take 500 mg by mouth two (2) times daily as needed. Comments: Not taking currently      raNITIdine (ZANTAC) 150 mg tablet Take 150 mg by mouth two (2) times a day. conjugated estrogens (PREMARIN) 0.9 mg tablet Take 0.9 mg by mouth daily. acetaminophen (TYLENOL) 500 mg tablet Take 1,000 mg by mouth every six (6) hours as needed for Pain or Fever. fluticasone-salmeterol (ADVAIR) 250-50 mcg/dose diskus inhaler Take 1 Puff by inhalation two (2) times a day. Qty: 1 Inhaler, Refills: 1      escitalopram oxalate (LEXAPRO) 10 mg tablet Take 15 mg by mouth daily. levETIRAcetam (KEPPRA) 500 mg tablet Take 500 mg by mouth two (2) times a day. SUMAtriptan succinate 6 mg/0.5 mL kit 6 mg by SubCUTAneous route once as needed for Migraine. losartan-hydrochlorothiazide (HYZAAR) 100-25 mg per tablet Take 1 Tab by mouth daily. OXcarbazepine (TRILEPTAL) 300 mg tablet Take 300 mg by mouth two (2) times a day.          STOP taking these medications       guaiFENesin-codeine (ROBITUSSIN AC) 100-10 mg/5 mL solution Comments:   Reason for Stopping:               Procedures : none     Consults: Pulmonary/Critical Care      PHYSICAL EXAM   Visit Vitals  /77 (BP 1 Location: Right arm, BP Patient Position: At rest)   Pulse 60   Temp 98.3 °F (36.8 °C)   Resp 18   Ht 5' 2\" (1.575 m)   Wt 77.1 kg (170 lb)   SpO2 100%   Breastfeeding No   BMI 31.09 kg/m²     General: Awake, cooperative, no acute distress    HEENT: NC, Atraumatic. PERRLA, EOMI. Anicteric sclerae. Lungs:  CTA Bilaterally. No Wheezing/Rhonchi/Rales. Heart:  Regular  rhythm,  No murmur, No Rubs, No Gallops  Abdomen: Soft, Non distended, Non tender. +Bowel sounds,   Extremities: No c/c/e  Psych:   Not anxious or agitated. Neurologic:  No acute neurological deficits. Admission HPI :  Rigo Boyd is a 63 y.o. female with PMHX of asthma, hypertension came to ER due to worsening cough and fever. She has been treated as pneumonia versus bronchitis as  outpatient for 3 weeks. Flu screen was negative before. But her symptoms did not really improve. she reported her shortness of breath and wheezing become worsening and  fever at home. Her lactic  acid was 2, white count was normal.  Chest x-ray indicated possible right-sided pneumonia. She received breathing treatment in ER and iv abx/fluid,  but her symptoms not improving. Denies any slurred speech/headache/cp/n/v/blurred vission/d/c/palpitation/gait change/bleeding. Denies smoking/ any alcohol or drug use.      Admission and treatment discussed with patient and family, all agree and indicated a verbal understanding     Hospital Course :   Rigo Boyd is a 63 y.o. female with PMHX of asthma, hypertension was admitted due to asthma exacerbation and pneumonia. Since pt  was admitted, iv steroid/breathing treatment/IV abx were administrated for asthma exacerbation and pneumonia. Glucose was controlled per SSI. Pulmonology was on board due to history of intubation from asthma exacerbation. She continued doing very well with the treatment. She does have leukocytosis before discharge due to steroid induced. She remained afebrile and  hemodynamically stable during her stay. Sputum culture indicated gram negative rods. And one budding yeast  from  Gram stain,  blood sputum culture which was  Negative fungal-the one budding yeast was  like contamination. We also continue antiseizure medication during her stay. On discharge, no  Wheezing and shortness of breath resolved on discharge. She need to follow-up with pulmonology for asthma management and repeat CT for enlarged lymph node. Discharge planning discussed with patient, she agrees with the plan and no questions and concerns at this point. Activity: Activity as tolerated    Diet: Regular Diet    Follow-up: PCP and dr. Sue Howe     Disposition: home     Minutes spent on discharge: 45 min       Labs: Results:       Chemistry Recent Labs     02/18/20  0330 02/17/20  0033 02/16/20  0235   * 137* 135*    143 143   K 4.0 4.0 4.0    111 111   CO2 27 26 27   BUN 27* 24* 16   CREA 1.16 1.13 1.01   CA 7.7* 7.6* 7.6*   AGAP 6 6 5   BUCR 23* 21* 16      CBC w/Diff Recent Labs     02/17/20 0033 02/16/20  0235   WBC 16.3* 15.6*   RBC 3.43* 3.48*   HGB 10.3* 10.6*   HCT 31.6* 32.3*    308   GRANS 95* 94*   LYMPH 4* 4*   EOS 0 0      Cardiac Enzymes No results for input(s): CPK, CKND1, YAMILKA in the last 72 hours. No lab exists for component: CKRMB, TROIP   Coagulation No results for input(s): PTP, INR, APTT, INREXT in the last 72 hours. Lipid Panel No results found for: CHOL, CHOLPOCT, CHOLX, CHLST, CHOLV, 840951, HDL, HDLP, LDL, LDLC, DLDLP, 236868, VLDLC, VLDL, TGLX, TRIGL, TRIGP, TGLPOCT, CHHD, CHHDX   BNP No results for input(s): BNPP in the last 72 hours.    Liver Enzymes No results for input(s): TP, ALB, TBIL, AP, SGOT, GPT in the last 72 hours. No lab exists for component: DBIL   Thyroid Studies No results found for: T4, T3U, TSH, TSHEXT         Significant Diagnostic Studies: Xr Chest Pa Lat    Result Date: 2/14/2020  EXAM: XR CHEST PA LAT CLINICAL INDICATION/HISTORY: cough -Additional: None COMPARISON: Several prior exams, most recently 1/9/2019 TECHNIQUE: PA and lateral views of the chest _______________ FINDINGS: HEART AND MEDIASTINUM: Cardiac size and mediastinal contours are within normal limits LUNGS AND PLEURAL SPACES: Lungs are mildly underexpanded. Asymmetric area of opacity at the medial right lung base. No pneumothorax or pleural effusion. BONY THORAX AND SOFT TISSUES: No acute osseous abnormality _______________     IMPRESSION: Mildly underexpanded lungs with likely infiltrate in the medial right lung base. No accompanying pleural effusion. Recommend radiographic follow-up to document expected clinical resolution in 4-6 weeks' time. Ct Chest Wo Cont    Result Date: 2/14/2020  EXAM: CT Chest INDICATION: Patient currently admitted for bronchitis, not improving despite several weeks of antibiotics. . COMPARISON: Abdominal/pelvic CT 1/14/2016; prior chest radiographs 2/14/2020. Lidya Nino TECHNIQUE: Axial CT imaging from the thoracic inlet through the diaphragm without intravenous contrast. Multiplanar reformations were generated. One or more dose reduction techniques were used on this CT: automated exposure control, adjustment of the mAs and/or kVp according to patient size, and iterative reconstruction techniques. The specific techniques used on this CT exam have been documented in the patient's electronic medical record. Digital Imaging and Communications in Medicine (DICOM) format image data are available to nonaffiliated external healthcare facilities or entities on a secure, media free, reciprocally searchable basis with patient authorization for at least a 12-month period after this study. _______________ FINDINGS: LUNGS: Alveolar consolidation and groundglass density present within the medial right lower lobe with additional faint centrilobular nodules present within the periphery of the right lower lobe. The remaining right lung is clear. Minor basilar changes of atelectasis at the dependent left lower lobe. PLEURA: No pneumothorax or pleural effusion. AIRWAY: Unremarkable. MEDIASTINUM: Included thyroid gland is unremarkable. Normal cardiac size. No pericardial effusion. Thoracic aorta is of normal course and caliber. LYMPH NODES: Enlarged right supraclavicular lymph node (series 2, image 9) measures approximately 1.5 cm in short axis diameter. Left supraclavicular alex basin appears within normal limits. No axillary or mediastinal adenopathy. No enlarged hilar lymph nodes. UPPER ABDOMEN: Included upper abdomen demonstrates no acute abnormality. Small hiatal hernia. OSSEOUS STRUCTURES: No acute osseous abnormality. No suspicious or blastic osseous lesions. OTHER: _______________     IMPRESSION: 1. Right lower lobe pneumonia without evidence of accompanying pleural effusion. As previously, recommend radiographic follow-up to document expected clinical resolution in 4-6 weeks' time following appropriate antibiotic treatment. 2. Indeterminate right sternoclavicular lymph nodes noted enlargement. Unclear if this is a reactive phenomenon versus manifestation of separate disease process. Clinical follow-up recommended. If lymph node enlargement persists, consider dedicated imaging and/or tissue sampling.             Murleen Sandifer Medicine     CC: Other, MD Kirk

## 2020-02-20 ENCOUNTER — HOME CARE VISIT (OUTPATIENT)
Dept: HOME HEALTH SERVICES | Facility: HOME HEALTH | Age: 60
End: 2020-02-20

## 2020-02-20 LAB
BACTERIA SPEC CULT: NORMAL
BACTERIA SPEC CULT: NORMAL
SERVICE CMNT-IMP: NORMAL
SERVICE CMNT-IMP: NORMAL

## 2020-02-20 NOTE — ADT AUTH CERT NOTES
Pneumonia - Care Day 4 (2/17/2020) by Sravan Boyle Review Status Review Entered Completed 2/19/2020 16:39  
   
Criteria Review Care Day: 4 Care Date: 2/17/2020 Level of Care: Inpatient Floor Guideline Day 2 Clinical Status   
(X) * No CO2 retention or acidosis 2/19/2020 16:39:36 EST by Fournier Clore   
  No Co2 retention   
(X) * No requirement for mechanical ventilation 2/19/2020 16:39:36 EST by Fournier Clore   
  on RA   
(X) * Hypotension absent 2/19/2020 16:39:36 EST by Fournier Clore   
  /79   
(X) * Fever absent or reduced   
(X) * No hypoxia on room air or oxygenation improved 2/19/2020 16:39:36 EST by Fournier Clore   
  O2 sats 98% on RA   
( ) * Mental status improved or at baseline Activity ( ) * Increased activity Routes   
(X) Oral hydration, medications 2/19/2020 16:39:36 EST by Fournier Clore   
  oral hydration Medications (X) IV or oral antibiotics 2/19/2020 16:39:36 EST by Fournier Clore   
  Levaquin 500mg iv x1 zosyn 3,375g iv x2 
vanco 1250mg iv x1   
* Milestone Additional Notes 98.2-154/79-59-22-98% RA Asthma exacerbation ,moderate persistent , mild wheezing Iv steroid -weaning now   
breathing treatment with bronchodilator   
symptom treatment   
ssi for glucose control   
   
Cap-failed out -pt treatment ,  
Pneumonia-will d,c vanc and zosyn, continue levaquin to complete the course of treatment   
- legionella antigen urine/strep pneumonia urine negative , viral panel pending   
 influenza rapid screen negative   
 respiratory culture with gram stain one budding yeast-will f/u with final cx .   
-ct chest rt side pna   
   
Elevated lactic acidosis-Resolved   
   
HTN, accelerated-Continue home medication.  
   
seizure continue keppra -Seizure precaution Pulmonary recommendations:  
Compensated respiratory status and on room air; monitor for goal SPO2> 91% Woke up last night with respiratory distress, required bronchodilator as needed. Overall respiratory status has been improving.  
   
CT chest: RLL pneumonia.  Indeterminate right sternoclavicular lymph node enlargement of unclear reason, could be reactive versus manifestation of separate disease process. Recommend CT as outpatient.  
   
Leukocytosis likely due to steroid use.  No fever or sputum production.  Hemodynamic stable. Urine Legionella and pneumococcal antigen negative. Rapid influenza test negative. Sputum culture light normal debbi. Blood culture NGTD. Respiratory viral culture pending.  
   
Antibiotic choice: Vancomycin, Levofloxacin, Zosyn. Aspiration prevention bundle, head of the bed at 30' all times Oxygen: On room air.  Keep SPO2 >91%. Bronchodilators: Brovana nebulized twice daily, Pulmicort nebulized twice daily.  Albuterol HFA as needed.  Start ipratropium 4 times daily. Continue Singulair 10 mg daily. Steroids: Solu-Medrol 60 mg IV every 8 hours. Diuretics: Use Lasix as needed Airway clearance: Continue incentive spirometer. Florie Radha as needed.  Mucinex.  
   
Glycemic control Will defer respective systems problem management to primary and other consultant and follow patient with primary and other team  
Further recommendations will be based on the patient's response to recommended treatment and results of the investigation ordered. Discussed with patient, RN.  
   
If improving, likely discharge home in 1 to 2 days. Outpatient PFT. wbc 16.3 rbc 3.43 hgb 10.3 hct 31.6 glu 137 bun 24 Ca 7.6  
  
albuterol 2.5mg neb x2   
brovna 15mcg neb x2  
aspirin 81mg po x1  
pulmicort 500mcg neb x2 Cardizem 180mg po x1  
heparin 5000units sc x3  
sliding scale insulin 2units sc x1  
toradol 15mg iv x3  
keppra 500mg  
solu Medrol 60mg iv x4  
zofran 4mg iv x1  
atrovent 0.5mg neb x1  
  
   
Pneumonia - Care Day 3 (2/16/2020) by Inez Aj  
 Review Status Review Entered Completed 2/19/2020 16:32  
   
Criteria Review Care Day: 3 Care Date: 2/16/2020 Level of Care: Inpatient Floor Guideline Day 2 Clinical Status   
(X) * No CO2 retention or acidosis 2/19/2020 16:32:31 EST by Keshawn Hortensia   
  No CO2 retention   
(X) * No requirement for mechanical ventilation 2/19/2020 16:32:31 EST by [place] Hortensia   
  on RA   
(X) * Hypotension absent 2/19/2020 16:32:31 EST by [place] Hortensia   
  /64   
(X) * Fever absent or reduced 2/19/2020 16:32:31 EST by [place] Hortensia   
  T 97.8   
(X) * No hypoxia on room air or oxygenation improved 2/19/2020 16:32:31 EST by Keshawn Hortensia   
  O2 sats 95% on RA   
( ) * Mental status improved or at baseline Activity ( ) * Increased activity Routes   
(X) Oral hydration, medications 2/19/2020 16:32:31 EST by Keshawn Hortensia   
  oral hydration Medications (X) IV or oral antibiotics 2/19/2020 16:32:31 EST by [place] Hortensia   
  Levaquin 500mg iv x1 zosyn 3,375g iv x3 
vanco 1250mg iv x2 * Milestone Additional Notes 97.8-129/64-66-16-95% RA Asthma exacerbation ,moderate persistent Improving, wheezing is better Iv steroid-will start wean tomorrow   
breathing treatment with bronchodilator   
symptom treatment   
ssi for glucose control   
   
Cap-failed out -pt treatment ,  
Pneumonia  
 continue levaquin and vanc/azithromycin   
- legionella antigen urine/strep pneumonia urine negative , viral panel pending   
 influenza rapid screen negative   
 respiratory culture with gram stain-still pending   
-ct chest rt side pna   
   
Elevated lactic acidosis-Resolved   
   
 HTN, accelerated-Continue home medication.  
   
 seizure continue keppra -Seizure precaution   
  
glu 163 wbc 15.6 rbc 3.48 hgb 10.6  
  
albuterol 2.5mg neb x1   
brovna 15mcg neb x2  
aspirin 81mg po x1  
pulmicort 500mcg neb x2 Cardizem 180mg po x1  
heparin 5000units sc x3  
 sliding scale insulin 2units sc x1  
toradol 15mg iv x3  
keppra 500mg  
solu Medrol 60mg iv x4  
zofran 4mg iv x2

## 2020-02-21 LAB
FLUAV RNA SPEC QL NAA+PROBE: NEGATIVE
FLUBV RNA SPEC QL NAA+PROBE: NEGATIVE
HADV DNA SPEC QL NAA+PROBE: NEGATIVE
HMPV RNA SPEC QL NAA+PROBE: NEGATIVE
HPIV1 RNA SPEC QL NAA+PROBE: NEGATIVE
HPIV2 RNA SPEC QL NAA+PROBE: NEGATIVE
HPIV3 RNA SPEC QL NAA+PROBE: NEGATIVE
RHINOVIRUS RNA SPEC QL NAA+PROBE: NEGATIVE
RSV A RNA SPEC QL NAA+PROBE: NEGATIVE
RSV B RNA SPEC QL NAA+PROBE: NEGATIVE
SPECIMEN SOURCE: NORMAL

## 2020-02-23 ENCOUNTER — HOME CARE VISIT (OUTPATIENT)
Dept: HOME HEALTH SERVICES | Facility: HOME HEALTH | Age: 60
End: 2020-02-23

## 2020-08-17 ENCOUNTER — HOSPITAL ENCOUNTER (OUTPATIENT)
Dept: LAB | Age: 60
Discharge: HOME | End: 2020-08-17
Payer: COMMERCIAL

## 2020-08-17 LAB
ALBUMIN SERPL-MCNC: 3.8 G/DL (ref 3.4–5)
ALBUMIN/GLOB SERPL: 1.3 {RATIO} (ref 0.8–1.7)
ALP SERPL-CCNC: 80 U/L (ref 45–117)
ALT SERPL-CCNC: 12 U/L (ref 13–56)
ANION GAP SERPL CALC-SCNC: 5 MMOL/L (ref 3–18)
AST SERPL-CCNC: 12 U/L (ref 10–38)
BILIRUB SERPL-MCNC: 0.2 MG/DL (ref 0.2–1)
BUN SERPL-MCNC: 15 MG/DL (ref 7–18)
BUN/CREAT SERPL: 15 (ref 12–20)
CALCIUM SERPL-MCNC: 8.5 MG/DL (ref 8.5–10.1)
CHLORIDE SERPL-SCNC: 103 MMOL/L (ref 100–111)
CO2 SERPL-SCNC: 33 MMOL/L (ref 21–32)
CREAT SERPL-MCNC: 1 MG/DL (ref 0.6–1.3)
ERYTHROCYTE [DISTWIDTH] IN BLOOD BY AUTOMATED COUNT: 12.9 % (ref 11.6–14.5)
GLOBULIN SER CALC-MCNC: 3 G/DL (ref 2–4)
GLUCOSE SERPL-MCNC: 94 MG/DL (ref 74–99)
HCT VFR BLD AUTO: 36.5 % (ref 35–45)
HGB BLD-MCNC: 11.7 G/DL (ref 12–16)
MCH RBC QN AUTO: 30.6 PG (ref 24–34)
MCHC RBC AUTO-ENTMCNC: 32.1 G/DL (ref 31–37)
MCV RBC AUTO: 95.5 FL (ref 74–97)
PLATELET # BLD AUTO: 369 K/UL (ref 135–420)
PMV BLD AUTO: 9.6 FL (ref 9.2–11.8)
POTASSIUM SERPL-SCNC: 3.8 MMOL/L (ref 3.5–5.5)
PROT SERPL-MCNC: 6.8 G/DL (ref 6.4–8.2)
RBC # BLD AUTO: 3.82 M/UL (ref 4.2–5.3)
SODIUM SERPL-SCNC: 141 MMOL/L (ref 136–145)
WBC # BLD AUTO: 4.2 K/UL (ref 4.6–13.2)

## 2020-08-17 PROCEDURE — 85027 COMPLETE CBC AUTOMATED: CPT

## 2020-08-17 PROCEDURE — 80183 DRUG SCRN QUANT OXCARBAZEPIN: CPT

## 2020-08-17 PROCEDURE — 36415 COLL VENOUS BLD VENIPUNCTURE: CPT

## 2020-08-17 PROCEDURE — 80177 DRUG SCRN QUAN LEVETIRACETAM: CPT

## 2020-08-17 PROCEDURE — 80053 COMPREHEN METABOLIC PANEL: CPT

## 2020-08-19 LAB
FAX TO INFO,FAXT: NORMAL
FAX TO NUMBER,FAXN: NORMAL
LEVETIRACETAM SERPL-MCNC: 18.3 UG/ML (ref 10–40)
OXCARBAZEPINE SERPL-MCNC: 33 UG/ML (ref 10–35)

## 2020-08-24 ENCOUNTER — HOSPITAL ENCOUNTER (OUTPATIENT)
Dept: LAB | Age: 60
Discharge: HOME | End: 2020-08-24
Payer: COMMERCIAL

## 2020-08-24 LAB
BASOPHILS # BLD: 0 K/UL (ref 0–0.1)
BASOPHILS NFR BLD: 0 % (ref 0–2)
DIFFERENTIAL METHOD BLD: ABNORMAL
EOSINOPHIL # BLD: 0.3 K/UL (ref 0–0.4)
EOSINOPHIL NFR BLD: 5 % (ref 0–5)
ERYTHROCYTE [DISTWIDTH] IN BLOOD BY AUTOMATED COUNT: 12.7 % (ref 11.6–14.5)
ERYTHROCYTE [SEDIMENTATION RATE] IN BLOOD: 7 MM/HR (ref 0–30)
HCT VFR BLD AUTO: 34.8 % (ref 35–45)
HGB BLD-MCNC: 11.2 G/DL (ref 12–16)
IGA SERPL-MCNC: 48 MG/DL (ref 70–400)
IGG SERPL-MCNC: 383 MG/DL (ref 700–1600)
IGM SERPL-MCNC: <21 MG/DL (ref 40–230)
LYMPHOCYTES # BLD: 1.4 K/UL (ref 0.9–3.6)
LYMPHOCYTES NFR BLD: 29 % (ref 21–52)
MCH RBC QN AUTO: 31.5 PG (ref 24–34)
MCHC RBC AUTO-ENTMCNC: 32.2 G/DL (ref 31–37)
MCV RBC AUTO: 97.8 FL (ref 74–97)
MONOCYTES # BLD: 0.5 K/UL (ref 0.05–1.2)
MONOCYTES NFR BLD: 10 % (ref 3–10)
NEUTS SEG # BLD: 2.8 K/UL (ref 1.8–8)
NEUTS SEG NFR BLD: 56 % (ref 40–73)
PLATELET # BLD AUTO: 407 K/UL (ref 135–420)
PMV BLD AUTO: 9.7 FL (ref 9.2–11.8)
RBC # BLD AUTO: 3.56 M/UL (ref 4.2–5.3)
WBC # BLD AUTO: 5 K/UL (ref 4.6–13.2)

## 2020-08-24 PROCEDURE — 86200 CCP ANTIBODY: CPT

## 2020-08-24 PROCEDURE — 82785 ASSAY OF IGE: CPT

## 2020-08-24 PROCEDURE — 85025 COMPLETE CBC W/AUTO DIFF WBC: CPT

## 2020-08-24 PROCEDURE — 85652 RBC SED RATE AUTOMATED: CPT

## 2020-08-24 PROCEDURE — 86003 ALLG SPEC IGE CRUDE XTRC EA: CPT

## 2020-08-24 PROCEDURE — 86038 ANTINUCLEAR ANTIBODIES: CPT

## 2020-08-24 PROCEDURE — 82784 ASSAY IGA/IGD/IGG/IGM EACH: CPT

## 2020-08-24 PROCEDURE — 82164 ANGIOTENSIN I ENZYME TEST: CPT

## 2020-08-24 PROCEDURE — 86431 RHEUMATOID FACTOR QUANT: CPT

## 2020-08-24 PROCEDURE — 86480 TB TEST CELL IMMUN MEASURE: CPT

## 2020-08-24 PROCEDURE — 36415 COLL VENOUS BLD VENIPUNCTURE: CPT

## 2020-08-24 PROCEDURE — 86225 DNA ANTIBODY NATIVE: CPT

## 2020-08-24 PROCEDURE — 82787 IGG 1 2 3 OR 4 EACH: CPT

## 2020-08-25 LAB
ACE SERPL-CCNC: 41 U/L (ref 14–82)
ANA SER QL: NEGATIVE
CCP IGA+IGG SERPL IA-ACNC: 4 UNITS (ref 0–19)
DSDNA AB SER-ACNC: 1 IU/ML (ref 0–9)
IGG SERPL-MCNC: 708 MG/DL (ref 586–1602)
IGG1 SER-MCNC: 444 MG/DL (ref 248–810)
IGG2 SER-MCNC: 132 MG/DL (ref 130–555)
IGG3 SER-MCNC: 57 MG/DL (ref 15–102)
IGG4 SER-MCNC: 23 MG/DL (ref 2–96)

## 2020-08-26 LAB
CANNABINOIDS BLD CFM-MCNC: <7 IU (ref 0–15)
IGE SERPL-ACNC: 26 IU/ML (ref 6–495)

## 2020-08-27 LAB
M TB IFN-G BLD-IMP: NEGATIVE
QUANTIFERON CRITERIA, QFI1T: NORMAL
QUANTIFERON MITOGEN VALUE: >10 IU/ML
QUANTIFERON NIL VALUE: 0.02 IU/ML
QUANTIFERON TB1 AG: 0.02 IU/ML
QUANTIFERON TB2 AG: 0.01 IU/ML

## 2020-08-29 LAB
A ALTERNATA IGE QN: <0.1 KU/L
A FUMIGATUS IGE QN: <0.1 KU/L
AMER ROACH IGE QN: <0.1 KU/L
AMER SYCAMORE IGE QN: <0.1 KU/L
BAHIA GRASS IGE QN: <0.1 KU/L
BERMUDA GRASS IGE QN: <0.1 KU/L
BOXELDER IGE QN: <0.1 KU/L
C HERBARUM IGE QN: <0.1 KU/L
CAT DANDER IGG QN: <0.1 KU/L
CLASS DESCRIPTION, 600268: ABNORMAL
COMMON RAGWEED IGE QN: <0.1 KU/L
D FARINAE IGE QN: <0.1 KU/L
D PTERONYSS IGE QN: <0.1 KU/L
DEPRECATED IGE QN: <0.1 KU/L
DOG DANDER IGE QN: 0.1 KU/L
ENGL PLANTAIN IGE QN: <0.1 KU/L
JOHNSON GRASS IGE QN: <0.1 KU/L
M RACEMOSUS IGE QN: <0.1 KU/L
MT JUNIPER IGE QN: <0.1 KU/L
MUGWORT IGE QN: <0.1 KU/L
NETTLE IGE QN: <0.1 KU/L
P NOTATUM IGE QN: <0.1 KU/L
S BOTRYOSUM IGE QN: <0.1 KU/L
SHEEP SORREL IGE QN: <0.1 KU/L
SWEET GUM IGE QN: <0.1 KU/L
TIMOTHY IGE QN: <0.1 KU/L
WHITE BIRCH IGE QN: <0.1 KU/L
WHITE ELM IGG QN: <0.1 KU/L
WHITE HICKORY IGE QN: <0.1 KU/L
WHITE MULBERRY IGE QN: <0.1 KU/L
WHITE OAK IGE QN: <0.1 KU/L

## 2020-08-31 LAB
FAX TO INFO,FAXT: NORMAL
FAX TO NUMBER,FAXN: NORMAL

## 2020-09-23 ENCOUNTER — HOSPITAL ENCOUNTER (OUTPATIENT)
Dept: CT IMAGING | Age: 60
Discharge: HOME | End: 2020-09-23
Attending: INTERNAL MEDICINE
Payer: COMMERCIAL

## 2020-09-23 DIAGNOSIS — J45.50 SEVERE PERSISTENT ASTHMA: ICD-10-CM

## 2020-09-23 DIAGNOSIS — J18.9 PNEUMONIA: ICD-10-CM

## 2020-09-23 DIAGNOSIS — J98.9 RESPIRATION DISORDER: ICD-10-CM

## 2020-09-23 LAB — CREAT UR-MCNC: 1.1 MG/DL (ref 0.6–1.3)

## 2020-09-23 PROCEDURE — 82565 ASSAY OF CREATININE: CPT

## 2020-09-23 PROCEDURE — 74011000636 HC RX REV CODE- 636: Performed by: INTERNAL MEDICINE

## 2020-09-23 PROCEDURE — 70491 CT SOFT TISSUE NECK W/DYE: CPT

## 2020-09-23 PROCEDURE — 71260 CT THORAX DX C+: CPT

## 2020-09-23 RX ADMIN — IOPAMIDOL 100 ML: 612 INJECTION, SOLUTION INTRAVENOUS at 13:23

## 2021-04-19 ENCOUNTER — TRANSCRIBE ORDER (OUTPATIENT)
Dept: REGISTRATION | Age: 61
End: 2021-04-19

## 2021-04-19 ENCOUNTER — HOSPITAL ENCOUNTER (OUTPATIENT)
Dept: LAB | Age: 61
Discharge: HOME | End: 2021-04-19
Payer: COMMERCIAL

## 2021-04-19 DIAGNOSIS — G40.209 COMPLEX PARTIAL SEIZURES WITH CONSCIOUSNESS IMPAIRED (HCC): ICD-10-CM

## 2021-04-19 DIAGNOSIS — R41.3 MEMORY LOSS: ICD-10-CM

## 2021-04-19 DIAGNOSIS — R41.3 MEMORY LOSS: Primary | ICD-10-CM

## 2021-04-19 LAB
RPR SER QL: NONREACTIVE
T4 FREE SERPL-MCNC: 0.5 NG/DL (ref 0.7–1.5)
TSH SERPL DL<=0.05 MIU/L-ACNC: 1.28 UIU/ML (ref 0.36–3.74)
VIT B12 SERPL-MCNC: 356 PG/ML (ref 211–911)

## 2021-04-19 PROCEDURE — 80177 DRUG SCRN QUAN LEVETIRACETAM: CPT

## 2021-04-19 PROCEDURE — 80183 DRUG SCRN QUANT OXCARBAZEPIN: CPT

## 2021-04-19 PROCEDURE — 84439 ASSAY OF FREE THYROXINE: CPT

## 2021-04-19 PROCEDURE — 86592 SYPHILIS TEST NON-TREP QUAL: CPT

## 2021-04-19 PROCEDURE — 82607 VITAMIN B-12: CPT

## 2021-04-19 PROCEDURE — 36415 COLL VENOUS BLD VENIPUNCTURE: CPT

## 2021-04-19 PROCEDURE — 84443 ASSAY THYROID STIM HORMONE: CPT

## 2021-04-22 LAB
LEVETIRACETAM SERPL-MCNC: 16 UG/ML (ref 10–40)
OXCARBAZEPINE SERPL-MCNC: 16 UG/ML (ref 10–35)

## 2021-04-26 LAB
FAX TO INFO,FAXT: NORMAL
FAX TO NUMBER,FAXN: NORMAL

## 2021-08-09 ENCOUNTER — APPOINTMENT (OUTPATIENT)
Dept: MRI IMAGING | Age: 61
End: 2021-08-09
Attending: PHYSICIAN ASSISTANT
Payer: COMMERCIAL

## 2021-08-09 ENCOUNTER — HOSPITAL ENCOUNTER (EMERGENCY)
Age: 61
Discharge: HOME | End: 2021-08-09
Attending: EMERGENCY MEDICINE
Payer: COMMERCIAL

## 2021-08-09 ENCOUNTER — APPOINTMENT (OUTPATIENT)
Dept: CT IMAGING | Age: 61
End: 2021-08-09
Attending: PHYSICIAN ASSISTANT
Payer: COMMERCIAL

## 2021-08-09 VITALS
DIASTOLIC BLOOD PRESSURE: 71 MMHG | TEMPERATURE: 97.5 F | HEIGHT: 62 IN | WEIGHT: 175 LBS | OXYGEN SATURATION: 100 % | BODY MASS INDEX: 32.2 KG/M2 | HEART RATE: 63 BPM | RESPIRATION RATE: 19 BRPM | SYSTOLIC BLOOD PRESSURE: 120 MMHG

## 2021-08-09 DIAGNOSIS — R42 VERTIGO: ICD-10-CM

## 2021-08-09 DIAGNOSIS — J01.90 ACUTE SINUSITIS, RECURRENCE NOT SPECIFIED, UNSPECIFIED LOCATION: Primary | ICD-10-CM

## 2021-08-09 LAB
ALBUMIN SERPL-MCNC: 4.4 G/DL (ref 3.4–5)
ALBUMIN/GLOB SERPL: 1.3 {RATIO} (ref 0.8–1.7)
ALP SERPL-CCNC: 88 U/L (ref 45–117)
ALT SERPL-CCNC: 15 U/L (ref 13–56)
ANION GAP SERPL CALC-SCNC: 4 MMOL/L (ref 3–18)
APPEARANCE UR: CLEAR
AST SERPL-CCNC: 13 U/L (ref 10–38)
ATRIAL RATE: 61 BPM
BASOPHILS # BLD: 0 K/UL (ref 0–0.1)
BASOPHILS NFR BLD: 1 % (ref 0–2)
BILIRUB SERPL-MCNC: 0.3 MG/DL (ref 0.2–1)
BILIRUB UR QL: NEGATIVE
BUN SERPL-MCNC: 14 MG/DL (ref 7–18)
BUN/CREAT SERPL: 15 (ref 12–20)
CALCIUM SERPL-MCNC: 9.6 MG/DL (ref 8.5–10.1)
CALCULATED P AXIS, ECG09: 49 DEGREES
CALCULATED R AXIS, ECG10: 18 DEGREES
CALCULATED T AXIS, ECG11: 103 DEGREES
CHLORIDE SERPL-SCNC: 99 MMOL/L (ref 100–111)
CK MB CFR SERPL CALC: NORMAL % (ref 0–4)
CK MB SERPL-MCNC: <1 NG/ML (ref 5–25)
CK SERPL-CCNC: 50 U/L (ref 26–192)
CO2 SERPL-SCNC: 33 MMOL/L (ref 21–32)
COLOR UR: YELLOW
CREAT SERPL-MCNC: 0.91 MG/DL (ref 0.6–1.3)
DIAGNOSIS, 93000: NORMAL
DIFFERENTIAL METHOD BLD: ABNORMAL
EOSINOPHIL # BLD: 0.1 K/UL (ref 0–0.4)
EOSINOPHIL NFR BLD: 2 % (ref 0–5)
ERYTHROCYTE [DISTWIDTH] IN BLOOD BY AUTOMATED COUNT: 11.8 % (ref 11.6–14.5)
GLOBULIN SER CALC-MCNC: 3.4 G/DL (ref 2–4)
GLUCOSE SERPL-MCNC: 88 MG/DL (ref 74–99)
GLUCOSE UR STRIP.AUTO-MCNC: NEGATIVE MG/DL
HCT VFR BLD AUTO: 40.4 % (ref 35–45)
HGB BLD-MCNC: 13.6 G/DL (ref 12–16)
HGB UR QL STRIP: NEGATIVE
KETONES UR QL STRIP.AUTO: 15 MG/DL
LEUKOCYTE ESTERASE UR QL STRIP.AUTO: NEGATIVE
LIPASE SERPL-CCNC: 106 U/L (ref 73–393)
LYMPHOCYTES # BLD: 1.4 K/UL (ref 0.9–3.6)
LYMPHOCYTES NFR BLD: 41 % (ref 21–52)
MCH RBC QN AUTO: 30.8 PG (ref 24–34)
MCHC RBC AUTO-ENTMCNC: 33.7 G/DL (ref 31–37)
MCV RBC AUTO: 91.6 FL (ref 74–97)
MONOCYTES # BLD: 0.3 K/UL (ref 0.05–1.2)
MONOCYTES NFR BLD: 9 % (ref 3–10)
NEUTS SEG # BLD: 1.6 K/UL (ref 1.8–8)
NEUTS SEG NFR BLD: 47 % (ref 40–73)
NITRITE UR QL STRIP.AUTO: NEGATIVE
P-R INTERVAL, ECG05: 162 MS
PH UR STRIP: 6.5 [PH] (ref 5–8)
PLATELET # BLD AUTO: 390 K/UL (ref 135–420)
PMV BLD AUTO: 9.5 FL (ref 9.2–11.8)
POTASSIUM SERPL-SCNC: 3.7 MMOL/L (ref 3.5–5.5)
PROT SERPL-MCNC: 7.8 G/DL (ref 6.4–8.2)
PROT UR STRIP-MCNC: NEGATIVE MG/DL
Q-T INTERVAL, ECG07: 398 MS
QRS DURATION, ECG06: 88 MS
QTC CALCULATION (BEZET), ECG08: 400 MS
RBC # BLD AUTO: 4.41 M/UL (ref 4.2–5.3)
SARS-COV-2, COV2: NORMAL
SODIUM SERPL-SCNC: 136 MMOL/L (ref 136–145)
SP GR UR REFRACTOMETRY: 1.01 (ref 1–1.03)
TROPONIN I SERPL-MCNC: <0.02 NG/ML (ref 0–0.04)
UROBILINOGEN UR QL STRIP.AUTO: 1 EU/DL (ref 0.2–1)
VENTRICULAR RATE, ECG03: 61 BPM
WBC # BLD AUTO: 3.5 K/UL (ref 4.6–13.2)

## 2021-08-09 PROCEDURE — 85025 COMPLETE CBC W/AUTO DIFF WBC: CPT

## 2021-08-09 PROCEDURE — 96365 THER/PROPH/DIAG IV INF INIT: CPT

## 2021-08-09 PROCEDURE — 93005 ELECTROCARDIOGRAM TRACING: CPT

## 2021-08-09 PROCEDURE — 99285 EMERGENCY DEPT VISIT HI MDM: CPT

## 2021-08-09 PROCEDURE — 96375 TX/PRO/DX INJ NEW DRUG ADDON: CPT

## 2021-08-09 PROCEDURE — 74011000258 HC RX REV CODE- 258: Performed by: PHYSICIAN ASSISTANT

## 2021-08-09 PROCEDURE — 81003 URINALYSIS AUTO W/O SCOPE: CPT

## 2021-08-09 PROCEDURE — 74011250636 HC RX REV CODE- 250/636: Performed by: PHYSICIAN ASSISTANT

## 2021-08-09 PROCEDURE — 70450 CT HEAD/BRAIN W/O DYE: CPT

## 2021-08-09 PROCEDURE — 83690 ASSAY OF LIPASE: CPT

## 2021-08-09 PROCEDURE — U0005 INFEC AGEN DETEC AMPLI PROBE: HCPCS

## 2021-08-09 PROCEDURE — 80053 COMPREHEN METABOLIC PANEL: CPT

## 2021-08-09 PROCEDURE — 70551 MRI BRAIN STEM W/O DYE: CPT

## 2021-08-09 PROCEDURE — 82553 CREATINE MB FRACTION: CPT

## 2021-08-09 RX ORDER — IBUPROFEN 800 MG/1
800 TABLET ORAL
Qty: 20 TABLET | Refills: 0 | Status: SHIPPED | OUTPATIENT
Start: 2021-08-09 | End: 2021-08-16

## 2021-08-09 RX ORDER — DIPHENHYDRAMINE HYDROCHLORIDE 50 MG/ML
25 INJECTION, SOLUTION INTRAMUSCULAR; INTRAVENOUS
Status: COMPLETED | OUTPATIENT
Start: 2021-08-09 | End: 2021-08-09

## 2021-08-09 RX ORDER — LORAZEPAM 2 MG/ML
2 INJECTION INTRAMUSCULAR ONCE
Status: COMPLETED | OUTPATIENT
Start: 2021-08-09 | End: 2021-08-09

## 2021-08-09 RX ORDER — ONDANSETRON 4 MG/1
4 TABLET, ORALLY DISINTEGRATING ORAL
Qty: 20 TABLET | Refills: 0 | Status: SHIPPED | OUTPATIENT
Start: 2021-08-09

## 2021-08-09 RX ORDER — FLUTICASONE PROPIONATE 50 MCG
2 SPRAY, SUSPENSION (ML) NASAL DAILY
Qty: 1 BOTTLE | Refills: 0 | Status: SHIPPED | OUTPATIENT
Start: 2021-08-09

## 2021-08-09 RX ORDER — MECLIZINE HYDROCHLORIDE 25 MG/1
25 TABLET ORAL
Qty: 12 TABLET | Refills: 0 | Status: SHIPPED | OUTPATIENT
Start: 2021-08-09 | End: 2021-08-19

## 2021-08-09 RX ADMIN — SODIUM CHLORIDE 1000 ML: 900 INJECTION, SOLUTION INTRAVENOUS at 19:40

## 2021-08-09 RX ADMIN — DIPHENHYDRAMINE HYDROCHLORIDE 25 MG: 50 INJECTION INTRAMUSCULAR; INTRAVENOUS at 16:10

## 2021-08-09 RX ADMIN — LORAZEPAM 2 MG: 2 INJECTION INTRAMUSCULAR; INTRAVENOUS at 17:29

## 2021-08-09 RX ADMIN — SODIUM CHLORIDE 1000 ML: 900 INJECTION, SOLUTION INTRAVENOUS at 16:10

## 2021-08-09 RX ADMIN — PROMETHAZINE HYDROCHLORIDE 25 MG: 25 INJECTION INTRAMUSCULAR; INTRAVENOUS at 16:12

## 2021-08-09 RX ADMIN — SODIUM CHLORIDE 1000 ML: 900 INJECTION, SOLUTION INTRAVENOUS at 17:29

## 2021-08-09 NOTE — ED PROVIDER NOTES
EMERGENCY DEPARTMENT HISTORY AND PHYSICAL EXAM    Date: 8/9/2021  Patient Name: Jet Starks    History of Presenting Illness     Chief Complaint   Patient presents with    Vomiting    Dizziness    Fatigue         History Provided By: Patient    Chief Complaint: dizziness, vomiting, headache       Additional History (Context):   3:40 PM  Jet Starks is a 62 y.o. female with PMHX migraines, hypertension, seizures, asthma, CKD, PUD, diabetes presents to the emergency department C/O dizziness vomiting and headache that started on Saturday. Patient has had some diarrhea denies abdominal pain. No fevers. Patient states she does get migraine headaches but this feels different and that she has a room spinning dizziness sensation. She denies any vision change slurred speech or one-sided weakness. No chest pain or shortness of breath. She does report an episode of near syncope yesterday when she got up from vomiting and was walking to her bed. Patient states she fell to the ground but did not hit her head or lose consciousness. PCP: Kirk Ryan MD    Current Outpatient Medications   Medication Sig Dispense Refill    fluticasone propionate (FLONASE) 50 mcg/actuation nasal spray 2 Sprays by Both Nostrils route daily. 1 Bottle 0    ibuprofen (MOTRIN) 800 mg tablet Take 1 Tablet by mouth every six (6) hours as needed for Pain for up to 7 days. 20 Tablet 0    meclizine (ANTIVERT) 25 mg tablet Take 1 Tablet by mouth three (3) times daily as needed for Dizziness for up to 10 days. 12 Tablet 0    ondansetron (Zofran ODT) 4 mg disintegrating tablet 1 Tablet by SubLINGual route every eight (8) hours as needed for Nausea or Vomiting. 20 Tablet 0    predniSONE (STERAPRED) 5 mg dose pack See administration instruction per 5mg dose pack 21 Tab 0    CASCARA SAGRADA PO Take  by mouth.  dilTIAZem XR (DILACOR XR) 180 mg XR capsule Take  by mouth daily.       albuterol (PROVENTIL HFA, VENTOLIN HFA, PROAIR HFA) 90 mcg/actuation inhaler Take 2 Puffs by inhalation every four (4) hours as needed for Wheezing or Shortness of Breath. 1 Inhaler 1    inhalational spacing device 1 Each by Does Not Apply route as needed. Please dispense one adult spacer to be used with albuterol HFA. 1 Device 0    albuterol (ACCUNEB) 1.25 mg/3 mL nebu Take 3 mL by inhalation every four (4) hours as needed (wheezing). 25 Each 0    clonazePAM (KLONOPIN) 0.5 mg tablet Take 1 Tab by mouth three (3) times daily. Max Daily Amount: 1.5 mg. 20 Tab 0    dronabinol (MARINOL) 2.5 mg capsule Take 1 Cap by mouth Before breakfast and dinner. Max Daily Amount: 5 mg. 20 Cap 0    montelukast (SINGULAIR) 10 mg tablet Take 10 mg by mouth daily.  naproxen (NAPROSYN) 500 mg tablet Take 500 mg by mouth two (2) times daily as needed.  raNITIdine (ZANTAC) 150 mg tablet Take 150 mg by mouth two (2) times a day.  conjugated estrogens (PREMARIN) 0.9 mg tablet Take 0.9 mg by mouth daily.  acetaminophen (TYLENOL) 500 mg tablet Take 1,000 mg by mouth every six (6) hours as needed for Pain or Fever.  fluticasone-salmeterol (ADVAIR) 250-50 mcg/dose diskus inhaler Take 1 Puff by inhalation two (2) times a day. 1 Inhaler 1    escitalopram oxalate (LEXAPRO) 10 mg tablet Take 15 mg by mouth daily.  aspirin delayed-release 81 mg tablet Take 81 mg by mouth daily.  levETIRAcetam (KEPPRA) 500 mg tablet Take 500 mg by mouth two (2) times a day.  SUMAtriptan succinate 6 mg/0.5 mL kit 6 mg by SubCUTAneous route once as needed for Migraine.  losartan-hydrochlorothiazide (HYZAAR) 100-25 mg per tablet Take 1 Tab by mouth daily.  OXcarbazepine (TRILEPTAL) 300 mg tablet Take 300 mg by mouth two (2) times a day.          Past History     Past Medical History:  Past Medical History:   Diagnosis Date    Arthritis     Asthma     Chronic kidney disease     deformed right  kidney    Diabetes [facility] [address]     Hypertension     Migraines     PUD (peptic ulcer disease)     H/O, no meds at this time 2016    Seizures ([facility] [address]     Unspecified sleep apnea     uses cpap       Past Surgical History:  Past Surgical History:   Procedure Laterality Date    HX APPENDECTOMY      HX GYN      hysterectomy       Family History:  Family History   Problem Relation Age of Onset    Malignant Hyperthermia Neg Hx     Pseudocholinesterase Deficiency Neg Hx     Delayed Awakening Neg Hx     Post-op Nausea/Vomiting Neg Hx     Emergence Delirium Neg Hx     Post-op Cognitive Dysfunction Neg Hx     Other Neg Hx        Social History:  Social History     Tobacco Use    Smoking status: Never Smoker    Smokeless tobacco: Never Used   Substance Use Topics    Alcohol use: No    Drug use: No       Allergies: Allergies   Allergen Reactions    Morphine Itching     Can tolerate if premed w/ benadryl    Pertussis Vaccine,Adsorbed Swelling     Swelling at injection site    Vicodin [Hydrocodone-Acetaminophen] Itching     Can tolerate if premed w/ benadryl       Review of Systems   Review of Systems   Constitutional: Negative for chills and fever. Eyes: Positive for photophobia. Negative for visual disturbance. Respiratory: Negative for shortness of breath. Cardiovascular: Negative for chest pain. Gastrointestinal: Positive for diarrhea, nausea and vomiting. Negative for abdominal pain. Genitourinary: Negative for decreased urine volume, difficulty urinating, dysuria, enuresis, flank pain, frequency, hematuria, pelvic pain and urgency. Musculoskeletal: Negative for back pain and neck pain. Neurological: Positive for dizziness, syncope, weakness, light-headedness and headaches. Negative for numbness. All other systems reviewed and are negative.       Physical Exam     Vitals:    08/09/21 1715 08/09/21 1830 08/09/21 1915 08/09/21 2000   BP: 127/65 101/68 118/65 120/71   Pulse: 66 71 70 63   Resp: 19 18 19 19   Temp:       SpO2: 100% 100% 98% 100%   Weight: Height:         Physical Exam  Vitals and nursing note reviewed. Constitutional:       Appearance: She is well-developed. Comments: lying on stretcher in darkened room, mild distress, non toxic, no obvious neuro deficits    HENT:      Head: Normocephalic and atraumatic. Jaw: No trismus. Right Ear: Tympanic membrane, ear canal and external ear normal. No mastoid tenderness. No hemotympanum. Tympanic membrane is not perforated, erythematous, retracted or bulging. Left Ear: Tympanic membrane, ear canal and external ear normal. No mastoid tenderness. No hemotympanum. Tympanic membrane is not perforated, erythematous, retracted or bulging. Nose: Nose normal.      Mouth/Throat:      Pharynx: Uvula midline. No oropharyngeal exudate, posterior oropharyngeal erythema or uvula swelling. Tonsils: No tonsillar abscesses. Eyes:      Extraocular Movements: Extraocular movements intact. Pupils: Pupils are equal, round, and reactive to light. Neck:      Meningeal: Brudzinski's sign and Kernig's sign absent. Comments: No meningismus   No lymphadenopathy   Cardiovascular:      Rate and Rhythm: Normal rate and regular rhythm. Heart sounds: Normal heart sounds. No murmur heard. Pulmonary:      Effort: Pulmonary effort is normal. No respiratory distress. Breath sounds: Normal breath sounds. No wheezing or rales. Abdominal:      General: Bowel sounds are normal.      Palpations: Abdomen is soft. Tenderness: There is no abdominal tenderness. There is no right CVA tenderness or left CVA tenderness. Musculoskeletal:         General: Normal range of motion. Cervical back: Normal range of motion and neck supple. No rigidity. No spinous process tenderness or muscular tenderness. Normal range of motion. Skin:     General: Skin is warm and dry. Neurological:      Mental Status: She is alert and oriented to person, place, and time. GCS: GCS eye subscore is 4.  GCS verbal subscore is 5. GCS motor subscore is 6. Cranial Nerves: No cranial nerve deficit. Sensory: No sensory deficit. Motor: No tremor, atrophy or abnormal muscle tone. Coordination: Coordination normal.      Gait: Gait normal.      Comments: No neuro deficit   No pronator drift  Normal finger nose finger  N/V intact distally   Right arm and leg seem slightly weaker than the left  No slurred speech   No facial droop    Psychiatric:         Judgment: Judgment normal.         Diagnostic Study Results     Labs:     Recent Results (from the past 12 hour(s))   EKG, 12 LEAD, INITIAL    Collection Time: 08/09/21  3:30 PM   Result Value Ref Range    Ventricular Rate 61 BPM    Atrial Rate 61 BPM    P-R Interval 162 ms    QRS Duration 88 ms    Q-T Interval 398 ms    QTC Calculation (Bezet) 400 ms    Calculated P Axis 49 degrees    Calculated R Axis 18 degrees    Calculated T Axis 103 degrees    Diagnosis       Normal sinus rhythm  T wave abnormality, consider anterior ischemia  Abnormal ECG  When compared with ECG of 31-OCT-2016 21:41,  Vent. rate has decreased BY  53 BPM  Inverted T waves have replaced nonspecific T wave abnormality in Anterior   leads  Confirmed by Roxanne Richmond MD. ([id number]) on 8/9/2021 9:17:57 PM     CBC WITH AUTOMATED DIFF    Collection Time: 08/09/21  3:32 PM   Result Value Ref Range    WBC 3.5 (L) 4.6 - 13.2 K/uL    RBC 4.41 4.20 - 5.30 M/uL    HGB 13.6 12.0 - 16.0 g/dL    HCT 40.4 35.0 - 45.0 %    MCV 91.6 74.0 - 97.0 FL    MCH 30.8 24.0 - 34.0 PG    MCHC 33.7 31.0 - 37.0 g/dL    RDW 11.8 11.6 - 14.5 %    PLATELET 832 560 - 434 K/uL    MPV 9.5 9.2 - 11.8 FL    NEUTROPHILS 47 40 - 73 %    LYMPHOCYTES 41 21 - 52 %    MONOCYTES 9 3 - 10 %    EOSINOPHILS 2 0 - 5 %    BASOPHILS 1 0 - 2 %    ABS. NEUTROPHILS 1.6 (L) 1.8 - 8.0 K/UL    ABS. LYMPHOCYTES 1.4 0.9 - 3.6 K/UL    ABS. MONOCYTES 0.3 0.05 - 1.2 K/UL    ABS. EOSINOPHILS 0.1 0.0 - 0.4 K/UL    ABS.  BASOPHILS 0.0 0.0 - 0.1 K/UL    DF AUTOMATED     METABOLIC PANEL, COMPREHENSIVE    Collection Time: 08/09/21  3:32 PM   Result Value Ref Range    Sodium 136 136 - 145 mmol/L    Potassium 3.7 3.5 - 5.5 mmol/L    Chloride 99 (L) 100 - 111 mmol/L    CO2 33 (H) 21 - 32 mmol/L    Anion gap 4 3.0 - 18 mmol/L    Glucose 88 74 - 99 mg/dL    BUN 14 7.0 - 18 MG/DL    Creatinine 0.91 0.6 - 1.3 MG/DL    BUN/Creatinine ratio 15 12 - 20      GFR est AA >60 >60 ml/min/1.73m2    GFR est non-AA >60 >60 ml/min/1.73m2    Calcium 9.6 8.5 - 10.1 MG/DL    Bilirubin, total 0.3 0.2 - 1.0 MG/DL    ALT (SGPT) 15 13 - 56 U/L    AST (SGOT) 13 10 - 38 U/L    Alk. phosphatase 88 45 - 117 U/L    Protein, total 7.8 6.4 - 8.2 g/dL    Albumin 4.4 3.4 - 5.0 g/dL    Globulin 3.4 2.0 - 4.0 g/dL    A-G Ratio 1.3 0.8 - 1.7     LIPASE    Collection Time: 08/09/21  3:32 PM   Result Value Ref Range    Lipase 106 73 - 393 U/L   CARDIAC PANEL,(CK, CKMB & TROPONIN)    Collection Time: 08/09/21  3:32 PM   Result Value Ref Range    CK - MB <1.0 <3.6 ng/ml    CK-MB Index  0.0 - 4.0 %     CALCULATION NOT PERFORMED WHEN RESULT IS BELOW LINEAR LIMIT    CK 50 26 - 192 U/L    Troponin-I, QT <0.02 0.0 - 0.045 NG/ML   SARS-COV-2    Collection Time: 08/09/21  7:42 PM   Result Value Ref Range    SARS-CoV-2 Please find results under separate order     URINALYSIS W/ RFLX MICROSCOPIC    Collection Time: 08/09/21  7:43 PM   Result Value Ref Range    Color YELLOW      Appearance CLEAR      Specific gravity 1.012 1.005 - 1.030      pH (UA) 6.5 5.0 - 8.0      Protein Negative NEG mg/dL    Glucose Negative NEG mg/dL    Ketone 15 (A) NEG mg/dL    Bilirubin Negative NEG      Blood Negative NEG      Urobilinogen 1.0 0.2 - 1.0 EU/dL    Nitrites Negative NEG      Leukocyte Esterase Negative NEG         Radiologic Studies:  CT HEAD WO CONT   Final Result         1. No acute intracranial abnormality.    2. Mild paranasal sinus disease as above      MRI BRAIN WO CONT    (Results Pending)     CT Results  (Last 48 hours) 08/09/21 1624  CT HEAD WO CONT Final result    Impression:          1. No acute intracranial abnormality. 2. Mild paranasal sinus disease as above       Narrative:  EXAM: CT head       INDICATION: 62year-old patient with headache and dizziness       COMPARISON: CT head March 28, 2015       TECHNIQUE: Axial CT imaging of the head was performed without intravenous   contrast. Standard multiplanar coronal and sagittal reformatted images were   obtained and are included in interpretation. One or more dose reduction techniques were used on this CT: automated exposure   control, adjustment of the mAs and/or kVp according to patient size, and   iterative reconstruction techniques. The specific techniques used on this CT   exam have been documented in the patient's electronic medical record. Digital   Imaging and Communications in Medicine (DICOM) format image data are available   to nonaffiliated external healthcare facilities or entities on a secure, media   free, reciprocally searchable basis with patient authorization for at least a   12-month period after this study. _______________       FINDINGS:       BRAIN AND POSTERIOR FOSSA: The sulci, folia, ventricles and basal cisterns are   within normal limits for the patient?s age. There is no intracranial hemorrhage,   mass effect, or midline shift. The gray-white matter differentiation appears   within normal limits. EXTRA-AXIAL SPACES AND MENINGES: There are no abnormal extra-axial fluid   collections. CALVARIUM: Intact. SINUSES: Mucosal thickening of both anterior posterior ethmoid air cells is   present along with a small quantity of lobular mucosal thickening within the   sphenoid sinus. OTHER: None.       _______________               CXR Results  (Last 48 hours)    None          Medical Decision Making   I am the first provider for this patient.     I reviewed the vital signs, available nursing notes, past medical history, past surgical history, family history and social history. Vital Signs: Reviewed the patient's vital signs. Pulse Oximetry Analysis: 100% on RA       EKG interpretation: (Preliminary)  3:40 PM   Normal sinus rhythm rate 61 bpm T wave abnormality no STEMI  EKG read by Zach Kline PA-C     Records Reviewed: Nursing Notes, Old Medical Records and Previous electrocardiograms    Procedures:  Procedures    ED Course:   3:40 PM Initial assessment performed. The patients presenting problems have been discussed, and they are in agreement with the care plan formulated and outlined with them. I have encouraged them to ask questions as they arise throughout their visit.    4:22 PM case discussed with Dr. Vivek Mcneil, neuro. agrees with plan for MRI of brain without contrast    Discussion:  Pt presents with dizziness vomiting headache that is been going on for the past couple days. Patient had CT scan performed and MRI which so fluid levels in the sinuses. She was found to be a bit orthostatic as well. Suspect sinus fluid causing vertigo-like symptoms and headache. Will treat with meclizine Zofran Flonase and have patient follow-up with ENT. . Strict return precautions given, pt offering no questions or complaints. Diagnosis and Disposition     DISCHARGE NOTE:    Doreen Zelaya  results have been reviewed with her. She has been counseled regarding her diagnosis, treatment, and plan. She verbally conveys understanding and agreement of the signs, symptoms, diagnosis, treatment and prognosis and additionally agrees to follow up as discussed. She also agrees with the care-plan and conveys that all of her questions have been answered. I have also provided discharge instructions for her that include: educational information regarding their diagnosis and treatment, and list of reasons why they would want to return to the ED prior to their follow-up appointment, should her condition change.  She has been provided with education for proper emergency department utilization. CLINICAL IMPRESSION:    1. Acute sinusitis, recurrence not specified, unspecified location    2. Vertigo        PLAN:  1. D/C Home  2. Discharge Medication List as of 8/9/2021  7:43 PM      START taking these medications    Details   fluticasone propionate (FLONASE) 50 mcg/actuation nasal spray 2 Sprays by Both Nostrils route daily. , Normal, Disp-1 Bottle, R-0      ibuprofen (MOTRIN) 800 mg tablet Take 1 Tablet by mouth every six (6) hours as needed for Pain for up to 7 days. , Normal, Disp-20 Tablet, R-0      meclizine (ANTIVERT) 25 mg tablet Take 1 Tablet by mouth three (3) times daily as needed for Dizziness for up to 10 days. , Normal, Disp-12 Tablet, R-0      ondansetron (Zofran ODT) 4 mg disintegrating tablet 1 Tablet by SubLINGual route every eight (8) hours as needed for Nausea or Vomiting., Normal, Disp-20 Tablet, R-0         CONTINUE these medications which have NOT CHANGED    Details   predniSONE (STERAPRED) 5 mg dose pack See administration instruction per 5mg dose pack, Normal, Disp-21 Tab, R-0      CASCARA SAGRADA PO Take  by mouth., Historical Med      dilTIAZem XR (DILACOR XR) 180 mg XR capsule Take  by mouth daily. , Historical Med      albuterol (PROVENTIL HFA, VENTOLIN HFA, PROAIR HFA) 90 mcg/actuation inhaler Take 2 Puffs by inhalation every four (4) hours as needed for Wheezing or Shortness of Breath., Normal, Disp-1 Inhaler, R-1      inhalational spacing device 1 Each by Does Not Apply route as needed. Please dispense one adult spacer to be used with albuterol HFA., Normal, Disp-1 Device, R-0      albuterol (ACCUNEB) 1.25 mg/3 mL nebu Take 3 mL by inhalation every four (4) hours as needed (wheezing). , Normal, Disp-25 Each, R-0      clonazePAM (KLONOPIN) 0.5 mg tablet Take 1 Tab by mouth three (3) times daily.  Max Daily Amount: 1.5 mg., Print, Disp-20 Tab, R-0      dronabinol (MARINOL) 2.5 mg capsule Take 1 Cap by mouth Before breakfast and dinner. Max Daily Amount: 5 mg., Print, Disp-20 Cap, R-0      montelukast (SINGULAIR) 10 mg tablet Take 10 mg by mouth daily. , Historical Med      naproxen (NAPROSYN) 500 mg tablet Take 500 mg by mouth two (2) times daily as needed., Historical MedNot taking currently      raNITIdine (ZANTAC) 150 mg tablet Take 150 mg by mouth two (2) times a day., Historical Med      conjugated estrogens (PREMARIN) 0.9 mg tablet Take 0.9 mg by mouth daily. , Historical Med      acetaminophen (TYLENOL) 500 mg tablet Take 1,000 mg by mouth every six (6) hours as needed for Pain or Fever., Historical Med      fluticasone-salmeterol (ADVAIR) 250-50 mcg/dose diskus inhaler Take 1 Puff by inhalation two (2) times a day., Print, Disp-1 Inhaler, R-1      escitalopram oxalate (LEXAPRO) 10 mg tablet Take 15 mg by mouth daily. , Historical Med      aspirin delayed-release 81 mg tablet Take 81 mg by mouth daily. , Historical Med      levETIRAcetam (KEPPRA) 500 mg tablet Take 500 mg by mouth two (2) times a day., Historical Med      SUMAtriptan succinate 6 mg/0.5 mL kit 6 mg by SubCUTAneous route once as needed for Migraine., Historical Med      losartan-hydrochlorothiazide (HYZAAR) 100-25 mg per tablet Take 1 Tab by mouth daily. , Historical Med      OXcarbazepine (TRILEPTAL) 300 mg tablet Take 300 mg by mouth two (2) times a day., Historical Med           3. Follow-up Information     Follow up With Specialties Details Why Contact Elizabeth Stearns DO Otolaryngology Schedule an appointment as soon as possible for a visit   [address] [place] [address] [phone]      [facility] EMERGENCY DEPT Emergency Medicine  If symptoms worsen [address] [id number]  [phone]              Please note that this dictation was completed with [employer], the [employer] voice recognition software.   Quite often unanticipated grammatical, syntax, homophones, and other interpretive errors are inadvertently transcribed by the computer software. Please disregard these errors. Please excuse any errors that have escaped final proofreading.

## 2021-08-09 NOTE — ED NOTES
Patient arrives with  with complaints of headache, dizziness, nausea, vomiting, and diarrhea. Patient has been generally ill for 2 weeks after being exposed to sick granddaughter. Patient has a history of migraines but states this one is worse. Patient has history of vertigo but has not been able to take medication due to nausea. History of seizures, no recent seizure activity.

## 2021-08-10 NOTE — ED PROVIDER NOTES
EMERGENCY DEPARTMENT HISTORY AND PHYSICAL EXAM    Date: 8/9/2021  Patient Name: Sariah Brown    History of Presenting Illness     Chief Complaint   Patient presents with    Vomiting    Dizziness    Fatigue         History Provided By: Patient    Chief Complaint: dizziness, vomiting, headache       Additional History (Context):   3:40 PM  Sariah Brown is a 62 y.o. female with PMHX migraines, hypertension, seizures, asthma, CKD, PUD, diabetes presents to the emergency department C/O dizziness vomiting and headache that started on Saturday. Patient has had some diarrhea denies abdominal pain. No fevers. Patient states she does get migraine headaches but this feels different and that she has a room spinning dizziness sensation. She denies any vision change slurred speech or one-sided weakness. No chest pain or shortness of breath. She does report an episode of near syncope yesterday when she got up from vomiting and was walking to her bed. Patient states she fell to the ground but did not hit her head or lose consciousness. PCP: Connor, MD Kirk    Current Outpatient Medications   Medication Sig Dispense Refill    fluticasone propionate (FLONASE) 50 mcg/actuation nasal spray 2 Sprays by Both Nostrils route daily. 1 Bottle 0    ibuprofen (MOTRIN) 800 mg tablet Take 1 Tablet by mouth every six (6) hours as needed for Pain for up to 7 days. 20 Tablet 0    meclizine (ANTIVERT) 25 mg tablet Take 1 Tablet by mouth three (3) times daily as needed for Dizziness for up to 10 days. 12 Tablet 0    ondansetron (Zofran ODT) 4 mg disintegrating tablet 1 Tablet by SubLINGual route every eight (8) hours as needed for Nausea or Vomiting. 20 Tablet 0    predniSONE (STERAPRED) 5 mg dose pack See administration instruction per 5mg dose pack 21 Tab 0    CASCARA SAGRADA PO Take  by mouth.  dilTIAZem XR (DILACOR XR) 180 mg XR capsule Take  by mouth daily.       albuterol (PROVENTIL HFA, VENTOLIN HFA, PROAIR HFA) 90 mcg/actuation inhaler Take 2 Puffs by inhalation every four (4) hours as needed for Wheezing or Shortness of Breath. 1 Inhaler 1    inhalational spacing device 1 Each by Does Not Apply route as needed. Please dispense one adult spacer to be used with albuterol HFA. 1 Device 0    albuterol (ACCUNEB) 1.25 mg/3 mL nebu Take 3 mL by inhalation every four (4) hours as needed (wheezing). 25 Each 0    clonazePAM (KLONOPIN) 0.5 mg tablet Take 1 Tab by mouth three (3) times daily. Max Daily Amount: 1.5 mg. 20 Tab 0    dronabinol (MARINOL) 2.5 mg capsule Take 1 Cap by mouth Before breakfast and dinner. Max Daily Amount: 5 mg. 20 Cap 0    montelukast (SINGULAIR) 10 mg tablet Take 10 mg by mouth daily.  naproxen (NAPROSYN) 500 mg tablet Take 500 mg by mouth two (2) times daily as needed.  raNITIdine (ZANTAC) 150 mg tablet Take 150 mg by mouth two (2) times a day.  conjugated estrogens (PREMARIN) 0.9 mg tablet Take 0.9 mg by mouth daily.  acetaminophen (TYLENOL) 500 mg tablet Take 1,000 mg by mouth every six (6) hours as needed for Pain or Fever.  fluticasone-salmeterol (ADVAIR) 250-50 mcg/dose diskus inhaler Take 1 Puff by inhalation two (2) times a day. 1 Inhaler 1    escitalopram oxalate (LEXAPRO) 10 mg tablet Take 15 mg by mouth daily.  aspirin delayed-release 81 mg tablet Take 81 mg by mouth daily.  levETIRAcetam (KEPPRA) 500 mg tablet Take 500 mg by mouth two (2) times a day.  SUMAtriptan succinate 6 mg/0.5 mL kit 6 mg by SubCUTAneous route once as needed for Migraine.  losartan-hydrochlorothiazide (HYZAAR) 100-25 mg per tablet Take 1 Tab by mouth daily.  OXcarbazepine (TRILEPTAL) 300 mg tablet Take 300 mg by mouth two (2) times a day.          Past History     Past Medical History:  Past Medical History:   Diagnosis Date    Arthritis     Asthma     Chronic kidney disease     deformed right  kidney    Diabetes ([facility] [address]     Hypertension     Migraines     PUD (peptic ulcer disease)     H/O, no meds at this time 2016    Seizures ([facility] [address]     Unspecified sleep apnea     uses cpap       Past Surgical History:  Past Surgical History:   Procedure Laterality Date    HX APPENDECTOMY      HX GYN      hysterectomy       Family History:  Family History   Problem Relation Age of Onset    Malignant Hyperthermia Neg Hx     Pseudocholinesterase Deficiency Neg Hx     Delayed Awakening Neg Hx     Post-op Nausea/Vomiting Neg Hx     Emergence Delirium Neg Hx     Post-op Cognitive Dysfunction Neg Hx     Other Neg Hx        Social History:  Social History     Tobacco Use    Smoking status: Never Smoker    Smokeless tobacco: Never Used   Substance Use Topics    Alcohol use: No    Drug use: No       Allergies: Allergies   Allergen Reactions    Morphine Itching     Can tolerate if premed w/ benadryl    Pertussis Vaccine,Adsorbed Swelling     Swelling at injection site    Vicodin [Hydrocodone-Acetaminophen] Itching     Can tolerate if premed w/ benadryl       Review of Systems   Review of Systems   Constitutional: Negative for chills and fever. Eyes: Positive for photophobia. Negative for visual disturbance. Respiratory: Negative for shortness of breath. Cardiovascular: Negative for chest pain. Gastrointestinal: Positive for diarrhea, nausea and vomiting. Negative for abdominal pain. Genitourinary: Negative for decreased urine volume, difficulty urinating, dysuria, enuresis, flank pain, frequency, hematuria, pelvic pain and urgency. Musculoskeletal: Negative for back pain and neck pain. Neurological: Positive for dizziness, syncope, weakness, light-headedness and headaches. Negative for numbness. All other systems reviewed and are negative.       Physical Exam     Vitals:    08/09/21 1715 08/09/21 1830 08/09/21 1915 08/09/21 2000   BP: 127/65 101/68 118/65 120/71   Pulse: 66 71 70 63   Resp: 19 18 19 19   Temp:       SpO2: 100% 100% 98% 100%   Weight: Height:         Physical Exam  Vitals and nursing note reviewed. Constitutional:       Appearance: She is well-developed. Comments: lying on stretcher in darkened room, mild distress, non toxic, no obvious neuro deficits    HENT:      Head: Normocephalic and atraumatic. Jaw: No trismus. Right Ear: Tympanic membrane, ear canal and external ear normal. No mastoid tenderness. No hemotympanum. Tympanic membrane is not perforated, erythematous, retracted or bulging. Left Ear: Tympanic membrane, ear canal and external ear normal. No mastoid tenderness. No hemotympanum. Tympanic membrane is not perforated, erythematous, retracted or bulging. Nose: Nose normal.      Mouth/Throat:      Pharynx: Uvula midline. No oropharyngeal exudate, posterior oropharyngeal erythema or uvula swelling. Tonsils: No tonsillar abscesses. Eyes:      Extraocular Movements: Extraocular movements intact. Pupils: Pupils are equal, round, and reactive to light. Neck:      Meningeal: Brudzinski's sign and Kernig's sign absent. Comments: No meningismus   No lymphadenopathy   Cardiovascular:      Rate and Rhythm: Normal rate and regular rhythm. Heart sounds: Normal heart sounds. No murmur heard. Pulmonary:      Effort: Pulmonary effort is normal. No respiratory distress. Breath sounds: Normal breath sounds. No wheezing or rales. Abdominal:      General: Bowel sounds are normal.      Palpations: Abdomen is soft. Tenderness: There is no abdominal tenderness. There is no right CVA tenderness or left CVA tenderness. Musculoskeletal:         General: Normal range of motion. Cervical back: Normal range of motion and neck supple. No rigidity. No spinous process tenderness or muscular tenderness. Normal range of motion. Skin:     General: Skin is warm and dry. Neurological:      Mental Status: She is alert and oriented to person, place, and time. GCS: GCS eye subscore is 4.  GCS verbal subscore is 5. GCS motor subscore is 6. Cranial Nerves: No cranial nerve deficit. Sensory: No sensory deficit. Motor: No tremor, atrophy or abnormal muscle tone. Coordination: Coordination normal.      Gait: Gait normal.      Comments: No neuro deficit   No pronator drift  Normal finger nose finger  N/V intact distally   Right arm and leg seem slightly weaker than the left  No slurred speech   No facial droop    Psychiatric:         Judgment: Judgment normal.         Diagnostic Study Results     Labs:     Recent Results (from the past 12 hour(s))   EKG, 12 LEAD, INITIAL    Collection Time: 08/09/21  3:30 PM   Result Value Ref Range    Ventricular Rate 61 BPM    Atrial Rate 61 BPM    P-R Interval 162 ms    QRS Duration 88 ms    Q-T Interval 398 ms    QTC Calculation (Bezet) 400 ms    Calculated P Axis 49 degrees    Calculated R Axis 18 degrees    Calculated T Axis 103 degrees    Diagnosis       Normal sinus rhythm  T wave abnormality, consider anterior ischemia  Abnormal ECG  When compared with ECG of 31-OCT-2016 21:41,  Vent. rate has decreased BY  53 BPM  Inverted T waves have replaced nonspecific T wave abnormality in Anterior   leads  Confirmed by Brandon Treviño MD. ([id number]) on 8/9/2021 9:17:57 PM     CBC WITH AUTOMATED DIFF    Collection Time: 08/09/21  3:32 PM   Result Value Ref Range    WBC 3.5 (L) 4.6 - 13.2 K/uL    RBC 4.41 4.20 - 5.30 M/uL    HGB 13.6 12.0 - 16.0 g/dL    HCT 40.4 35.0 - 45.0 %    MCV 91.6 74.0 - 97.0 FL    MCH 30.8 24.0 - 34.0 PG    MCHC 33.7 31.0 - 37.0 g/dL    RDW 11.8 11.6 - 14.5 %    PLATELET 358 583 - 618 K/uL    MPV 9.5 9.2 - 11.8 FL    NEUTROPHILS 47 40 - 73 %    LYMPHOCYTES 41 21 - 52 %    MONOCYTES 9 3 - 10 %    EOSINOPHILS 2 0 - 5 %    BASOPHILS 1 0 - 2 %    ABS. NEUTROPHILS 1.6 (L) 1.8 - 8.0 K/UL    ABS. LYMPHOCYTES 1.4 0.9 - 3.6 K/UL    ABS. MONOCYTES 0.3 0.05 - 1.2 K/UL    ABS. EOSINOPHILS 0.1 0.0 - 0.4 K/UL    ABS.  BASOPHILS 0.0 0.0 - 0.1 K/UL    DF AUTOMATED     METABOLIC PANEL, COMPREHENSIVE    Collection Time: 08/09/21  3:32 PM   Result Value Ref Range    Sodium 136 136 - 145 mmol/L    Potassium 3.7 3.5 - 5.5 mmol/L    Chloride 99 (L) 100 - 111 mmol/L    CO2 33 (H) 21 - 32 mmol/L    Anion gap 4 3.0 - 18 mmol/L    Glucose 88 74 - 99 mg/dL    BUN 14 7.0 - 18 MG/DL    Creatinine 0.91 0.6 - 1.3 MG/DL    BUN/Creatinine ratio 15 12 - 20      GFR est AA >60 >60 ml/min/1.73m2    GFR est non-AA >60 >60 ml/min/1.73m2    Calcium 9.6 8.5 - 10.1 MG/DL    Bilirubin, total 0.3 0.2 - 1.0 MG/DL    ALT (SGPT) 15 13 - 56 U/L    AST (SGOT) 13 10 - 38 U/L    Alk. phosphatase 88 45 - 117 U/L    Protein, total 7.8 6.4 - 8.2 g/dL    Albumin 4.4 3.4 - 5.0 g/dL    Globulin 3.4 2.0 - 4.0 g/dL    A-G Ratio 1.3 0.8 - 1.7     LIPASE    Collection Time: 08/09/21  3:32 PM   Result Value Ref Range    Lipase 106 73 - 393 U/L   CARDIAC PANEL,(CK, CKMB & TROPONIN)    Collection Time: 08/09/21  3:32 PM   Result Value Ref Range    CK - MB <1.0 <3.6 ng/ml    CK-MB Index  0.0 - 4.0 %     CALCULATION NOT PERFORMED WHEN RESULT IS BELOW LINEAR LIMIT    CK 50 26 - 192 U/L    Troponin-I, QT <0.02 0.0 - 0.045 NG/ML   SARS-COV-2    Collection Time: 08/09/21  7:42 PM   Result Value Ref Range    SARS-CoV-2 Please find results under separate order     URINALYSIS W/ RFLX MICROSCOPIC    Collection Time: 08/09/21  7:43 PM   Result Value Ref Range    Color YELLOW      Appearance CLEAR      Specific gravity 1.012 1.005 - 1.030      pH (UA) 6.5 5.0 - 8.0      Protein Negative NEG mg/dL    Glucose Negative NEG mg/dL    Ketone 15 (A) NEG mg/dL    Bilirubin Negative NEG      Blood Negative NEG      Urobilinogen 1.0 0.2 - 1.0 EU/dL    Nitrites Negative NEG      Leukocyte Esterase Negative NEG         Radiologic Studies:  CT HEAD WO CONT   Final Result         1. No acute intracranial abnormality.    2. Mild paranasal sinus disease as above      MRI BRAIN WO CONT    (Results Pending)     CT Results  (Last 48 hours) 08/09/21 1624  CT HEAD WO CONT Final result    Impression:          1. No acute intracranial abnormality. 2. Mild paranasal sinus disease as above       Narrative:  EXAM: CT head       INDICATION: 62year-old patient with headache and dizziness       COMPARISON: CT head March 28, 2015       TECHNIQUE: Axial CT imaging of the head was performed without intravenous   contrast. Standard multiplanar coronal and sagittal reformatted images were   obtained and are included in interpretation. One or more dose reduction techniques were used on this CT: automated exposure   control, adjustment of the mAs and/or kVp according to patient size, and   iterative reconstruction techniques. The specific techniques used on this CT   exam have been documented in the patient's electronic medical record. Digital   Imaging and Communications in Medicine (DICOM) format image data are available   to nonaffiliated external healthcare facilities or entities on a secure, media   free, reciprocally searchable basis with patient authorization for at least a   12-month period after this study. _______________       FINDINGS:       BRAIN AND POSTERIOR FOSSA: The sulci, folia, ventricles and basal cisterns are   within normal limits for the patient?s age. There is no intracranial hemorrhage,   mass effect, or midline shift. The gray-white matter differentiation appears   within normal limits. EXTRA-AXIAL SPACES AND MENINGES: There are no abnormal extra-axial fluid   collections. CALVARIUM: Intact. SINUSES: Mucosal thickening of both anterior posterior ethmoid air cells is   present along with a small quantity of lobular mucosal thickening within the   sphenoid sinus. OTHER: None.       _______________               CXR Results  (Last 48 hours)    None          Medical Decision Making   I am the first provider for this patient.     I reviewed the vital signs, available nursing notes, past medical history, past surgical history, family history and social history. Vital Signs: Reviewed the patient's vital signs. Pulse Oximetry Analysis: 100% on RA       EKG interpretation: (Preliminary)  3:40 PM     EKG read by *** at ***     Records Reviewed: {[email or web address]:07576}    Procedures:  Procedures    ED Course:   3:40 PM Initial assessment performed. The patients presenting problems have been discussed, and they are in agreement with the care plan formulated and outlined with them. I have encouraged them to ask questions as they arise throughout their visit.    4:22 PM case discussed with Dr. Selena Ramirez, neuro. agrees with plan for MRI of brain without contrast    Discussion:  Pt presents with ***. Strict return precautions given, pt offering no questions or complaints. Diagnosis and Disposition     DISCHARGE NOTE:  ***  Antwan Mcdonnell  results have been reviewed with her. She has been counseled regarding her diagnosis, treatment, and plan. She verbally conveys understanding and agreement of the signs, symptoms, diagnosis, treatment and prognosis and additionally agrees to follow up as discussed. She also agrees with the care-plan and conveys that all of her questions have been answered. I have also provided discharge instructions for her that include: educational information regarding their diagnosis and treatment, and list of reasons why they would want to return to the ED prior to their follow-up appointment, should her condition change. She has been provided with education for proper emergency department utilization. CLINICAL IMPRESSION:    1. Acute sinusitis, recurrence not specified, unspecified location    2. Vertigo        PLAN:  1. D/C Home  2. Discharge Medication List as of 8/9/2021  7:43 PM      START taking these medications    Details   fluticasone propionate (FLONASE) 50 mcg/actuation nasal spray 2 Sprays by Both Nostrils route daily. , Normal, Disp-1 Bottle, R-0      ibuprofen (MOTRIN) 800 mg tablet Take 1 Tablet by mouth every six (6) hours as needed for Pain for up to 7 days. , Normal, Disp-20 Tablet, R-0      meclizine (ANTIVERT) 25 mg tablet Take 1 Tablet by mouth three (3) times daily as needed for Dizziness for up to 10 days. , Normal, Disp-12 Tablet, R-0      ondansetron (Zofran ODT) 4 mg disintegrating tablet 1 Tablet by SubLINGual route every eight (8) hours as needed for Nausea or Vomiting., Normal, Disp-20 Tablet, R-0         CONTINUE these medications which have NOT CHANGED    Details   predniSONE (STERAPRED) 5 mg dose pack See administration instruction per 5mg dose pack, Normal, Disp-21 Tab, R-0      CASCARA SAGRADA PO Take  by mouth., Historical Med      dilTIAZem XR (DILACOR XR) 180 mg XR capsule Take  by mouth daily. , Historical Med      albuterol (PROVENTIL HFA, VENTOLIN HFA, PROAIR HFA) 90 mcg/actuation inhaler Take 2 Puffs by inhalation every four (4) hours as needed for Wheezing or Shortness of Breath., Normal, Disp-1 Inhaler, R-1      inhalational spacing device 1 Each by Does Not Apply route as needed. Please dispense one adult spacer to be used with albuterol HFA., Normal, Disp-1 Device, R-0      albuterol (ACCUNEB) 1.25 mg/3 mL nebu Take 3 mL by inhalation every four (4) hours as needed (wheezing). , Normal, Disp-25 Each, R-0      clonazePAM (KLONOPIN) 0.5 mg tablet Take 1 Tab by mouth three (3) times daily. Max Daily Amount: 1.5 mg., Print, Disp-20 Tab, R-0      dronabinol (MARINOL) 2.5 mg capsule Take 1 Cap by mouth Before breakfast and dinner. Max Daily Amount: 5 mg., Print, Disp-20 Cap, R-0      montelukast (SINGULAIR) 10 mg tablet Take 10 mg by mouth daily. , Historical Med      naproxen (NAPROSYN) 500 mg tablet Take 500 mg by mouth two (2) times daily as needed., Historical MedNot taking currently      raNITIdine (ZANTAC) 150 mg tablet Take 150 mg by mouth two (2) times a day., Historical Med      conjugated estrogens (PREMARIN) 0.9 mg tablet Take 0.9 mg by mouth daily. , Historical Med      acetaminophen (TYLENOL) 500 mg tablet Take 1,000 mg by mouth every six (6) hours as needed for Pain or Fever., Historical Med      fluticasone-salmeterol (ADVAIR) 250-50 mcg/dose diskus inhaler Take 1 Puff by inhalation two (2) times a day., Print, Disp-1 Inhaler, R-1      escitalopram oxalate (LEXAPRO) 10 mg tablet Take 15 mg by mouth daily. , Historical Med      aspirin delayed-release 81 mg tablet Take 81 mg by mouth daily. , Historical Med      levETIRAcetam (KEPPRA) 500 mg tablet Take 500 mg by mouth two (2) times a day., Historical Med      SUMAtriptan succinate 6 mg/0.5 mL kit 6 mg by SubCUTAneous route once as needed for Migraine., Historical Med      losartan-hydrochlorothiazide (HYZAAR) 100-25 mg per tablet Take 1 Tab by mouth daily. , Historical Med      OXcarbazepine (TRILEPTAL) 300 mg tablet Take 300 mg by mouth two (2) times a day., Historical Med           3. Follow-up Information     Follow up With Specialties Details Why Contact Info    Glenice Landau, DO Otolaryngology Schedule an appointment as soon as possible for a visit   [address] [facility] [phone] [facility] EMERGENCY DEPT Emergency Medicine  If symptoms worsen [address] [id number]  [phone]                 Please note that this dictation was completed with [employer] the [employer] voice recognition software. Quite often unanticipated grammatical, syntax, homophones, and other interpretive errors are inadvertently transcribed by the computer software. Please disregard these errors. Please excuse any errors that have escaped final proofreading. *** Please do not sign.

## 2021-08-11 LAB — SARS-COV-2, COV2NT: NOT DETECTED

## 2021-10-08 ENCOUNTER — TRANSCRIBE ORDER (OUTPATIENT)
Dept: REGISTRATION | Age: 61
End: 2021-10-08

## 2021-10-08 ENCOUNTER — HOSPITAL ENCOUNTER (OUTPATIENT)
Dept: PREADMISSION TESTING | Age: 61
Discharge: HOME | End: 2021-10-08
Payer: COMMERCIAL

## 2021-10-08 DIAGNOSIS — M17.11 OSTEOARTHRITIS OF RIGHT KNEE: ICD-10-CM

## 2021-10-08 DIAGNOSIS — Z01.812 BLOOD TESTS PRIOR TO TREATMENT OR PROCEDURE: ICD-10-CM

## 2021-10-08 DIAGNOSIS — M25.561 RIGHT KNEE PAIN: Primary | ICD-10-CM

## 2021-10-08 DIAGNOSIS — M25.561 RIGHT KNEE PAIN: ICD-10-CM

## 2021-10-08 LAB
ALBUMIN SERPL-MCNC: 3.8 G/DL (ref 3.4–5)
ALBUMIN/GLOB SERPL: 1.3 {RATIO} (ref 0.8–1.7)
ALP SERPL-CCNC: 83 U/L (ref 45–117)
ALT SERPL-CCNC: 12 U/L (ref 13–56)
ANION GAP SERPL CALC-SCNC: 5 MMOL/L (ref 3–18)
APPEARANCE UR: ABNORMAL
APTT PPP: 30.6 SEC (ref 23–36.4)
AST SERPL-CCNC: 10 U/L (ref 10–38)
BACTERIA URNS QL MICRO: ABNORMAL /HPF
BILIRUB SERPL-MCNC: 0.3 MG/DL (ref 0.2–1)
BILIRUB UR QL: NEGATIVE
BUN SERPL-MCNC: 15 MG/DL (ref 7–18)
BUN/CREAT SERPL: 15 (ref 12–20)
CALCIUM SERPL-MCNC: 9.1 MG/DL (ref 8.5–10.1)
CHLORIDE SERPL-SCNC: 100 MMOL/L (ref 100–111)
CO2 SERPL-SCNC: 33 MMOL/L (ref 21–32)
COLOR UR: YELLOW
CREAT SERPL-MCNC: 1.01 MG/DL (ref 0.6–1.3)
EPITH CASTS URNS QL MICRO: ABNORMAL /LPF (ref 0–5)
ERYTHROCYTE [DISTWIDTH] IN BLOOD BY AUTOMATED COUNT: 12.4 % (ref 11.6–14.5)
ERYTHROCYTE [SEDIMENTATION RATE] IN BLOOD: 12 MM/HR (ref 0–30)
GLOBULIN SER CALC-MCNC: 3 G/DL (ref 2–4)
GLUCOSE SERPL-MCNC: 88 MG/DL (ref 74–99)
GLUCOSE UR STRIP.AUTO-MCNC: NEGATIVE MG/DL
HCT VFR BLD AUTO: 35.1 % (ref 35–45)
HGB BLD-MCNC: 11.4 G/DL (ref 12–16)
HGB UR QL STRIP: NEGATIVE
INR PPP: 1 (ref 0.8–1.2)
KETONES UR QL STRIP.AUTO: ABNORMAL MG/DL
LEUKOCYTE ESTERASE UR QL STRIP.AUTO: NEGATIVE
MCH RBC QN AUTO: 30.3 PG (ref 24–34)
MCHC RBC AUTO-ENTMCNC: 32.5 G/DL (ref 31–37)
MCV RBC AUTO: 93.4 FL (ref 78–100)
NITRITE UR QL STRIP.AUTO: NEGATIVE
PH UR STRIP: 7.5 [PH] (ref 5–8)
PLATELET # BLD AUTO: 417 K/UL (ref 135–420)
PMV BLD AUTO: 9.9 FL (ref 9.2–11.8)
POTASSIUM SERPL-SCNC: 3.8 MMOL/L (ref 3.5–5.5)
PROT SERPL-MCNC: 6.8 G/DL (ref 6.4–8.2)
PROT UR STRIP-MCNC: ABNORMAL MG/DL
PROTHROMBIN TIME: 12.4 SEC (ref 11.5–15.2)
RBC # BLD AUTO: 3.76 M/UL (ref 4.2–5.3)
RBC #/AREA URNS HPF: ABNORMAL /HPF (ref 0–5)
SODIUM SERPL-SCNC: 138 MMOL/L (ref 136–145)
SP GR UR REFRACTOMETRY: 1.03 (ref 1–1.03)
UROBILINOGEN UR QL STRIP.AUTO: 1 EU/DL (ref 0.2–1)
WBC # BLD AUTO: 3.7 K/UL (ref 4.6–13.2)
WBC URNS QL MICRO: ABNORMAL /HPF (ref 0–5)

## 2021-10-08 PROCEDURE — 85610 PROTHROMBIN TIME: CPT

## 2021-10-08 PROCEDURE — 85652 RBC SED RATE AUTOMATED: CPT

## 2021-10-08 PROCEDURE — 36415 COLL VENOUS BLD VENIPUNCTURE: CPT

## 2021-10-08 PROCEDURE — 81001 URINALYSIS AUTO W/SCOPE: CPT

## 2021-10-08 PROCEDURE — 85730 THROMBOPLASTIN TIME PARTIAL: CPT

## 2021-10-08 PROCEDURE — 80053 COMPREHEN METABOLIC PANEL: CPT

## 2021-10-08 PROCEDURE — 85027 COMPLETE CBC AUTOMATED: CPT

## 2021-10-09 LAB
BACTERIA SPEC CULT: NORMAL
BACTERIA SPEC CULT: NORMAL
SERVICE CMNT-IMP: NORMAL

## 2021-10-20 ENCOUNTER — HOSPITAL ENCOUNTER (OUTPATIENT)
Dept: PREADMISSION TESTING | Age: 61
Discharge: HOME | End: 2021-10-20

## 2021-10-20 VITALS — WEIGHT: 174 LBS | BODY MASS INDEX: 32.02 KG/M2 | HEIGHT: 62 IN

## 2021-10-20 RX ORDER — IPRATROPIUM BROMIDE AND ALBUTEROL SULFATE 2.5; .5 MG/3ML; MG/3ML
3 SOLUTION RESPIRATORY (INHALATION) 4 TIMES DAILY
COMMUNITY

## 2021-10-20 RX ORDER — TRANEXAMIC ACID 10 MG/ML
1 INJECTION, SOLUTION INTRAVENOUS
Status: CANCELLED | OUTPATIENT
Start: 2021-10-20

## 2021-10-20 RX ORDER — AMLODIPINE BESYLATE 5 MG/1
5 TABLET ORAL DAILY
COMMUNITY

## 2021-10-20 RX ORDER — CEFAZOLIN SODIUM/WATER 2 G/20 ML
2 SYRINGE (ML) INTRAVENOUS ONCE
Status: CANCELLED | OUTPATIENT
Start: 2021-10-20 | End: 2021-10-20

## 2021-10-20 RX ORDER — LOSARTAN POTASSIUM 100 MG/1
100 TABLET ORAL DAILY
COMMUNITY

## 2021-10-20 RX ORDER — MELOXICAM 15 MG/1
15 TABLET ORAL DAILY
COMMUNITY

## 2021-10-20 RX ORDER — SODIUM CHLORIDE, SODIUM LACTATE, POTASSIUM CHLORIDE, CALCIUM CHLORIDE 600; 310; 30; 20 MG/100ML; MG/100ML; MG/100ML; MG/100ML
125 INJECTION, SOLUTION INTRAVENOUS CONTINUOUS
Status: CANCELLED | OUTPATIENT
Start: 2021-10-20

## 2021-10-20 RX ORDER — ESTRADIOL 0.5 MG/1
0.5 TABLET ORAL DAILY
COMMUNITY
End: 2021-11-02

## 2021-10-20 RX ORDER — HYDROCHLOROTHIAZIDE 25 MG/1
25 TABLET ORAL DAILY
COMMUNITY

## 2021-10-20 NOTE — PERIOP NOTES
PAT - SURGICAL PRE-ADMISSION INSTRUCTIONS    NAME:  Ana Russell                                                          TODAY'S DATE:  10/20/2021    SURGERY DATE:  11/2/2021                                  SURGERY ARRIVAL TIME:   tba    1. Do NOT eat or drink anything, including candy or gum, after MIDNIGHT on 11/1/21 , unless you have specific instructions from your Surgeon or Anesthesia Provider to do so. 2. No smoking 24 hours before surgery. 3. No alcohol 24 hours prior to the day of surgery. 4. No recreational drugs for one week prior to the day of surgery. 5. Leave all valuables, including money/purse, at home. 6. Remove all jewelry, nail polish, makeup (including mascara); no lotions, powders, deodorant, or perfume/cologne/after shave. 7. Glasses/Contact lenses and Dentures may be worn to the hospital.  They will be removed prior to surgery. 8. Call your doctor if symptoms of a cold or illness develop within 24 ours prior to surgery. 9. AN ADULT MUST DRIVE YOU HOME AFTER OUTPATIENT SURGERY. 10. If you are having an OUTPATIENT procedure, please make arrangements for a responsible adult to be with you for 24 hours after your surgery. 11. If you are admitted to the hospital, you will be assigned to a bed after surgery is complete. Normally a family member will not be able to see you until you are in your assigned bed. 12. Visitation Restrictions Explained. Special Instructions:  Covid Test 10/28/21, Quarantine requirements discussed  Bring inhaler., Bring CPAP., Take these medications the morning of surgery with a sip of water:  Amlodipine, Keppra, Trileptal, Lexapro,  Use Nebulizer & Advair, , Bring durable medical equipment (DME) needed:  Libby Young. \"Spec Pop\" - seizures  Patient Prep:    use CHG solution - instructions reviewed    These surgical instructions were reviewed with patient during the PAT phone call.

## 2021-10-20 NOTE — PERIOP NOTES
Preoperative Nutrition Screen (COY)   Patient's Age: 62 y.o. Patient's BMI: Estimated body mass index is 31.83 kg/m² as calculated from the following:    Height as of this encounter: 5' 2\" (1.575 m). Weight as of this encounter: 78.9 kg (174 lb). 1. Does the patient have a documented serum albumin less than 3.0 within the last 90 days? No = 0          2. Is patient's BMI less than 18.5 (or less than 20 if age over 72)? No = 0        3. Has the patient had an unplanned weight loss of 10% of body weight or more in the last 6 months? No = 0   4. Has the patient been eating less than 50% of their normal diet in the preceding week?  No = 0   COY Score (number of Yes responses), 0-4 0       Electronically signed by Ania Hampton RN on 10/20/21 at 12:12 PM

## 2021-10-26 PROBLEM — M17.11 OSTEOARTHRITIS OF RIGHT KNEE: Status: ACTIVE | Noted: 2021-10-26

## 2021-10-27 NOTE — H&P
Patient Name:  Ashtyn Kirby   YOB: 1960      Chief Complaint:  Bilateral knee pain. History of Chief Complaint:  Ms. Joel Cardenas presents for followup of bilateral knee complaints. She has previously been evaluated and noted to have advanced underlying arthritic change of both knees. She underwent injections almost a year ago with improvement of symptoms for about four to six months' time. She reports progressive worsening of pain in the right greater than left knee with what she describes as sharp, aching pain that she rates as 6 or 7/10. She has difficulty with weightbearing and prolonged ambulation and wants to consider further treatment options. She is here today for evaluation and is seen by myself and Dr. Lazarus Parkin. Past Medical/Surgical History:    Disease/Disorder Date Side Surgery Date Side Comment   Hypertension         Seizure disorder           Allergies:    Ingredient Reaction Medication Name Comment   ACETAMINOPHEN  Vicodin    MORPHINE      HYDROCODONE BITARTRATE  Vicodin        Current Medications:    Medication Directions   Mobic 15 mg tablet take 1 tablet by oral route  every day     Social History:    SMOKING  Status Tobacco Type Units Per Day Yrs Used   Former smoker Cigarette       ALCOHOL  There is a history of alcohol use. 2 drinks consumed weekly. Family History:    Disease Detail Family Member Age Cause of Death Comments   Cancer, unknown Mother  N    Heart disease Father  N    Hypertension Father  N      Vitals:  Date BP Pulse Temp (F) Resp. (per min.) Height (Total in.) Weight (lbs.) BMI   09/23/2021     62.00 160.00 29.26   11/16/2020     62.00  30.18   10/12/2020     63.00  30.18     Physical Examination:    Psychiatric/General:  No acute distress; pleasant and accommodating. HEENT:  Moist mucous membranes intact. Lymphatic:  Neck is supple with no lymphadenopathy upon evaluation.   Respiratory:   Equal bilateral chest wall movement with inspiration; no wheezes or stridor audible. Cardiovascular:    Regular rate and rhythm, as noted by upper extremity pulses. Musculoskeletal: On evaluation of the right knee range of motion with flexion and extension reproduces crepitus with tenderness to palpation about the medial and lateral joint line. No instability is noted with varus and valgus stress, gross motor and sensation is intact. Mild varus deformity is noted. On evaluation of the left knee range of motion with flexion and extension reproduces tenderness to palpation about the medial joint line with varus deformity noted. Gross motor and sensation is intact. Skin is without ulceration or lesion. Negative Kimberly. Data/Radiograph Evaluation:    Standing AP, tunnel, lateral, and sunrise views of the right knee were obtained and interpreted in the office today and show advanced underlying arthritic change with varus deformity with grade 3 to 4 changes in both knees. Assessment: Lower extremity complaints with advanced tricompartmental change in both knees and failure of conservative measures including medications and injections. Recommendation:  After discussion of options for treatment and the risks of the injection, the patient agreed to proceed with the injection. After sterile prep, the ultrasound transducer was placed over the medial joint line. Settings were adjusted to maximal visualization. The needle was introduced parallel and deep to the transducer from an inferolateral patellar approach under real time guidance. The intraarticular space and surrounding capsule were visualized. The medical necessity of the ultrasound is based on the need to localize the structures ensuring accuracy of placement which should increase the efficacy and longevity of the injection. The left knee was injected with 2cc of bupivacaine and 0.5cc of 80mg of methylprednisolone. Imaging was documented and stored on the PACS .   The patient tolerated the procedure well without complications. Post injection pain and blood sugar elevation were discussed. With failure of conservative measures we discussed moving forward with total knee arthroplasty. We will start on the right (more symptomatic) side. The risks and benefits were reviewed and include infection, bleeding, deep venous thrombosis, pulmonary embolism, heart attack, stroke, death, arthrofibrosis, need for manipulation, neurovascular compromise, need for revision surgical intervention, persistent long-term pain, need for chronic pain management, and metallic allergies. We discussed injection of the left knee to treat her symptomatically. We have given her a prescription for Mobic 15mg to utilize once daily and will continue with plans for scheduling right total knee arthroplasty. She will call the office with any and all concerns. The patient was counseled at length about the risks of raghav Covid-19 during their perioperative period and any recovery window from their procedure. The patient was made aware that raghav Covid-19  may worsen their prognosis for recovering from their procedure and lend to a higher morbidity and/or mortality risk. All material risks, benefits, and reasonable alternatives including postponing the procedure were discussed. The patient does  wish to proceed with the procedure at this time.     HILDA Barnhart DO

## 2021-10-28 ENCOUNTER — HOSPITAL ENCOUNTER (OUTPATIENT)
Dept: PREADMISSION TESTING | Age: 61
Discharge: HOME | End: 2021-10-28
Payer: COMMERCIAL

## 2021-10-28 PROCEDURE — U0005 INFEC AGEN DETEC AMPLI PROBE: HCPCS

## 2021-10-29 LAB — SARS-COV-2, NAA: NOT DETECTED

## 2021-11-01 ENCOUNTER — ANESTHESIA EVENT (OUTPATIENT)
Dept: SURGERY | Age: 61
End: 2021-11-01
Payer: COMMERCIAL

## 2021-11-02 ENCOUNTER — HOSPITAL ENCOUNTER (OUTPATIENT)
Age: 61
Setting detail: OUTPATIENT SURGERY
Discharge: HOME HEALTH | End: 2021-11-02
Attending: ORTHOPAEDIC SURGERY | Admitting: ORTHOPAEDIC SURGERY
Payer: COMMERCIAL

## 2021-11-02 ENCOUNTER — ANESTHESIA (OUTPATIENT)
Dept: SURGERY | Age: 61
End: 2021-11-02
Payer: COMMERCIAL

## 2021-11-02 ENCOUNTER — APPOINTMENT (OUTPATIENT)
Dept: GENERAL RADIOLOGY | Age: 61
End: 2021-11-02
Attending: PHYSICIAN ASSISTANT
Payer: COMMERCIAL

## 2021-11-02 VITALS
WEIGHT: 177.3 LBS | SYSTOLIC BLOOD PRESSURE: 99 MMHG | HEIGHT: 62 IN | RESPIRATION RATE: 16 BRPM | OXYGEN SATURATION: 97 % | TEMPERATURE: 97.4 F | BODY MASS INDEX: 32.63 KG/M2 | HEART RATE: 99 BPM | DIASTOLIC BLOOD PRESSURE: 57 MMHG

## 2021-11-02 DIAGNOSIS — Z96.651 S/P TOTAL KNEE ARTHROPLASTY, RIGHT: Primary | ICD-10-CM

## 2021-11-02 LAB
ABO + RH BLD: NORMAL
BLOOD GROUP ANTIBODIES SERPL: NORMAL
GLUCOSE BLD STRIP.AUTO-MCNC: 96 MG/DL (ref 70–110)
SPECIMEN EXP DATE BLD: NORMAL

## 2021-11-02 PROCEDURE — 86901 BLOOD TYPING SEROLOGIC RH(D): CPT

## 2021-11-02 PROCEDURE — 64450 NJX AA&/STRD OTHER PN/BRANCH: CPT | Performed by: SPECIALIST

## 2021-11-02 PROCEDURE — 74011250637 HC RX REV CODE- 250/637: Performed by: SPECIALIST

## 2021-11-02 PROCEDURE — 97535 SELF CARE MNGMENT TRAINING: CPT

## 2021-11-02 PROCEDURE — 77030013708 HC HNDPC SUC IRR PULS STRY –B: Performed by: ORTHOPAEDIC SURGERY

## 2021-11-02 PROCEDURE — 97165 OT EVAL LOW COMPLEX 30 MIN: CPT

## 2021-11-02 PROCEDURE — 77030011628: Performed by: ORTHOPAEDIC SURGERY

## 2021-11-02 PROCEDURE — 77030000032 HC CUF TRNQT ZIMM -B: Performed by: ORTHOPAEDIC SURGERY

## 2021-11-02 PROCEDURE — 74011250636 HC RX REV CODE- 250/636: Performed by: ORTHOPAEDIC SURGERY

## 2021-11-02 PROCEDURE — 2709999900 HC NON-CHARGEABLE SUPPLY: Performed by: ORTHOPAEDIC SURGERY

## 2021-11-02 PROCEDURE — C1713 ANCHOR/SCREW BN/BN,TIS/BN: HCPCS | Performed by: ORTHOPAEDIC SURGERY

## 2021-11-02 PROCEDURE — 77030031139 HC SUT VCRL2 J&J -A: Performed by: ORTHOPAEDIC SURGERY

## 2021-11-02 PROCEDURE — 74011000258 HC RX REV CODE- 258: Performed by: ORTHOPAEDIC SURGERY

## 2021-11-02 PROCEDURE — 74011250636 HC RX REV CODE- 250/636: Performed by: SPECIALIST

## 2021-11-02 PROCEDURE — 74011000250 HC RX REV CODE- 250: Performed by: SPECIALIST

## 2021-11-02 PROCEDURE — 74011000250 HC RX REV CODE- 250: Performed by: ORTHOPAEDIC SURGERY

## 2021-11-02 PROCEDURE — 77030031140 HC SUT VCRL3 J&J -A: Performed by: ORTHOPAEDIC SURGERY

## 2021-11-02 PROCEDURE — 82962 GLUCOSE BLOOD TEST: CPT

## 2021-11-02 PROCEDURE — 77030027138 HC INCENT SPIROMETER -A: Performed by: ORTHOPAEDIC SURGERY

## 2021-11-02 PROCEDURE — 76210000022 HC REC RM PH II 1.5 TO 2 HR: Performed by: ORTHOPAEDIC SURGERY

## 2021-11-02 PROCEDURE — 77030034479 HC ADH SKN CLSR PRINEO J&J -B: Performed by: ORTHOPAEDIC SURGERY

## 2021-11-02 PROCEDURE — 77030020813 HC INST SCULP CEM KT DISP S&N -B: Performed by: ORTHOPAEDIC SURGERY

## 2021-11-02 PROCEDURE — 77030016060 HC NDL NRV BLK TELE -A: Performed by: SPECIALIST

## 2021-11-02 PROCEDURE — 77030012508 HC MSK AIRWY LMA AMBU -A: Performed by: SPECIALIST

## 2021-11-02 PROCEDURE — 76010000149 HC OR TIME 1 TO 1.5 HR: Performed by: ORTHOPAEDIC SURGERY

## 2021-11-02 PROCEDURE — C9290 INJ, BUPIVACAINE LIPOSOME: HCPCS | Performed by: ORTHOPAEDIC SURGERY

## 2021-11-02 PROCEDURE — 77030034694 HC SCPL CANADY PLSM DISP USMD -E: Performed by: ORTHOPAEDIC SURGERY

## 2021-11-02 PROCEDURE — 97116 GAIT TRAINING THERAPY: CPT

## 2021-11-02 PROCEDURE — 77030002966 HC SUT PDS J&J -A: Performed by: ORTHOPAEDIC SURGERY

## 2021-11-02 PROCEDURE — 76210000017 HC OR PH I REC 1.5 TO 2 HR: Performed by: ORTHOPAEDIC SURGERY

## 2021-11-02 PROCEDURE — 73560 X-RAY EXAM OF KNEE 1 OR 2: CPT

## 2021-11-02 PROCEDURE — C1776 JOINT DEVICE (IMPLANTABLE): HCPCS | Performed by: ORTHOPAEDIC SURGERY

## 2021-11-02 PROCEDURE — 76060000033 HC ANESTHESIA 1 TO 1.5 HR: Performed by: ORTHOPAEDIC SURGERY

## 2021-11-02 PROCEDURE — 76942 ECHO GUIDE FOR BIOPSY: CPT

## 2021-11-02 PROCEDURE — 77030003666 HC NDL SPINAL BD -A: Performed by: ORTHOPAEDIC SURGERY

## 2021-11-02 PROCEDURE — 77030012551: Performed by: ORTHOPAEDIC SURGERY

## 2021-11-02 PROCEDURE — 74011000272 HC RX REV CODE- 272: Performed by: ORTHOPAEDIC SURGERY

## 2021-11-02 PROCEDURE — 36415 COLL VENOUS BLD VENIPUNCTURE: CPT

## 2021-11-02 PROCEDURE — 77030020782 HC GWN BAIR PAWS FLX 3M -B: Performed by: ORTHOPAEDIC SURGERY

## 2021-11-02 PROCEDURE — 77030038010: Performed by: ORTHOPAEDIC SURGERY

## 2021-11-02 PROCEDURE — 77030040361 HC SLV COMPR DVT MDII -B: Performed by: ORTHOPAEDIC SURGERY

## 2021-11-02 PROCEDURE — 97161 PT EVAL LOW COMPLEX 20 MIN: CPT

## 2021-11-02 DEVICE — LEGION HIGHLY CROSS LINKED                                    POLYETHYLENE CRUCIATE RETAINING                                    INSERT SIZE 1-2 9MM
Type: IMPLANTABLE DEVICE | Site: KNEE | Status: FUNCTIONAL
Brand: LEGION

## 2021-11-02 DEVICE — LEGION NARROW CRUCIATE RETAINING                                    OXINIUM SIZE 4N RIGHT
Type: IMPLANTABLE DEVICE | Site: KNEE | Status: FUNCTIONAL
Brand: LEGION

## 2021-11-02 DEVICE — CEMENT BNE 20GM HALF DOSE PMMA W/ GENT HI VISC RADPQ FAST: Type: IMPLANTABLE DEVICE | Site: KNEE | Status: FUNCTIONAL

## 2021-11-02 DEVICE — GENESIS II NON-POROUS TIBIAL                                    BASEPLATE SIZE 2 RIGHT
Type: IMPLANTABLE DEVICE | Site: KNEE | Status: FUNCTIONAL
Brand: GENESIS II

## 2021-11-02 DEVICE — KNEE K1 TOT HEMI STD CEM IMPL CAPPED K1 SN: Type: IMPLANTABLE DEVICE | Site: KNEE | Status: FUNCTIONAL

## 2021-11-02 DEVICE — GENESIS II RESURFACING PATELLAR                                    PROSTHESIS  29MM
Type: IMPLANTABLE DEVICE | Site: KNEE | Status: FUNCTIONAL
Brand: GENESIS II

## 2021-11-02 RX ORDER — LIDOCAINE HYDROCHLORIDE 20 MG/ML
INJECTION, SOLUTION EPIDURAL; INFILTRATION; INTRACAUDAL; PERINEURAL AS NEEDED
Status: DISCONTINUED | OUTPATIENT
Start: 2021-11-02 | End: 2021-11-02 | Stop reason: HOSPADM

## 2021-11-02 RX ORDER — DOCUSATE SODIUM 100 MG/1
100 CAPSULE, LIQUID FILLED ORAL DAILY
Status: CANCELLED | OUTPATIENT
Start: 2021-11-03

## 2021-11-02 RX ORDER — OXCARBAZEPINE 150 MG/1
300 TABLET, FILM COATED ORAL 3 TIMES DAILY
Status: CANCELLED | OUTPATIENT
Start: 2021-11-02

## 2021-11-02 RX ORDER — LANOLIN ALCOHOL/MO/W.PET/CERES
1 CREAM (GRAM) TOPICAL 2 TIMES DAILY WITH MEALS
Status: CANCELLED | OUTPATIENT
Start: 2021-11-02

## 2021-11-02 RX ORDER — ACETAMINOPHEN 500 MG
500 TABLET ORAL
Status: COMPLETED | OUTPATIENT
Start: 2021-11-02 | End: 2021-11-02

## 2021-11-02 RX ORDER — ARFORMOTEROL TARTRATE 15 UG/2ML
15 SOLUTION RESPIRATORY (INHALATION)
Status: CANCELLED | OUTPATIENT
Start: 2021-11-02

## 2021-11-02 RX ORDER — OXYCODONE HYDROCHLORIDE 5 MG/1
10 TABLET ORAL
Status: CANCELLED | OUTPATIENT
Start: 2021-11-02

## 2021-11-02 RX ORDER — ESCITALOPRAM OXALATE 10 MG/1
20 TABLET ORAL DAILY
Status: CANCELLED | OUTPATIENT
Start: 2021-11-03

## 2021-11-02 RX ORDER — DEXAMETHASONE SODIUM PHOSPHATE 4 MG/ML
INJECTION, SOLUTION INTRA-ARTICULAR; INTRALESIONAL; INTRAMUSCULAR; INTRAVENOUS; SOFT TISSUE AS NEEDED
Status: DISCONTINUED | OUTPATIENT
Start: 2021-11-02 | End: 2021-11-02 | Stop reason: HOSPADM

## 2021-11-02 RX ORDER — ALBUTEROL SULFATE 0.83 MG/ML
2.5 SOLUTION RESPIRATORY (INHALATION)
Status: CANCELLED | OUTPATIENT
Start: 2021-11-02

## 2021-11-02 RX ORDER — KETOROLAC TROMETHAMINE 15 MG/ML
INJECTION, SOLUTION INTRAMUSCULAR; INTRAVENOUS AS NEEDED
Status: DISCONTINUED | OUTPATIENT
Start: 2021-11-02 | End: 2021-11-02 | Stop reason: HOSPADM

## 2021-11-02 RX ORDER — ROPIVACAINE HYDROCHLORIDE 5 MG/ML
INJECTION, SOLUTION EPIDURAL; INFILTRATION; PERINEURAL
Status: COMPLETED | OUTPATIENT
Start: 2021-11-02 | End: 2021-11-02

## 2021-11-02 RX ORDER — MORPHINE SULFATE 4 MG/ML
1 INJECTION INTRAVENOUS
Status: CANCELLED | OUTPATIENT
Start: 2021-11-02

## 2021-11-02 RX ORDER — CEFAZOLIN SODIUM/WATER 2 G/20 ML
2 SYRINGE (ML) INTRAVENOUS ONCE
Status: COMPLETED | OUTPATIENT
Start: 2021-11-02 | End: 2021-11-02

## 2021-11-02 RX ORDER — MIDAZOLAM HYDROCHLORIDE 1 MG/ML
INJECTION, SOLUTION INTRAMUSCULAR; INTRAVENOUS AS NEEDED
Status: DISCONTINUED | OUTPATIENT
Start: 2021-11-02 | End: 2021-11-02 | Stop reason: HOSPADM

## 2021-11-02 RX ORDER — ACETAMINOPHEN 325 MG/1
650 TABLET ORAL
Status: CANCELLED | OUTPATIENT
Start: 2021-11-02

## 2021-11-02 RX ORDER — AMLODIPINE BESYLATE 5 MG/1
5 TABLET ORAL DAILY
Status: CANCELLED | OUTPATIENT
Start: 2021-11-03

## 2021-11-02 RX ORDER — PROPOFOL 10 MG/ML
INJECTION, EMULSION INTRAVENOUS AS NEEDED
Status: DISCONTINUED | OUTPATIENT
Start: 2021-11-02 | End: 2021-11-02 | Stop reason: HOSPADM

## 2021-11-02 RX ORDER — CEPHALEXIN 500 MG/1
1000 CAPSULE ORAL EVERY 8 HOURS
Qty: 4 CAPSULE | Refills: 0 | Status: SHIPPED | OUTPATIENT
Start: 2021-11-02 | End: 2021-11-03

## 2021-11-02 RX ORDER — IPRATROPIUM BROMIDE AND ALBUTEROL SULFATE 2.5; .5 MG/3ML; MG/3ML
3 SOLUTION RESPIRATORY (INHALATION)
Status: CANCELLED | OUTPATIENT
Start: 2021-11-02

## 2021-11-02 RX ORDER — TRANEXAMIC ACID 10 MG/ML
1 INJECTION, SOLUTION INTRAVENOUS
Status: COMPLETED | OUTPATIENT
Start: 2021-11-02 | End: 2021-11-02

## 2021-11-02 RX ORDER — OXYCODONE HYDROCHLORIDE 5 MG/1
5 TABLET ORAL
Status: CANCELLED | OUTPATIENT
Start: 2021-11-02

## 2021-11-02 RX ORDER — NALOXONE HYDROCHLORIDE 0.4 MG/ML
0.1 INJECTION, SOLUTION INTRAMUSCULAR; INTRAVENOUS; SUBCUTANEOUS
Status: DISCONTINUED | OUTPATIENT
Start: 2021-11-02 | End: 2021-11-02 | Stop reason: HOSPADM

## 2021-11-02 RX ORDER — DIPHENHYDRAMINE HCL 25 MG
25 CAPSULE ORAL
Qty: 30 CAPSULE | Refills: 0 | Status: SHIPPED | OUTPATIENT
Start: 2021-11-02 | End: 2021-11-12

## 2021-11-02 RX ORDER — DIPHENHYDRAMINE HCL 25 MG
25 CAPSULE ORAL
Status: CANCELLED | OUTPATIENT
Start: 2021-11-02

## 2021-11-02 RX ORDER — CELECOXIB 100 MG/1
200 CAPSULE ORAL ONCE
Status: COMPLETED | OUTPATIENT
Start: 2021-11-02 | End: 2021-11-02

## 2021-11-02 RX ORDER — ACETAMINOPHEN 500 MG
1000 TABLET ORAL ONCE
Status: COMPLETED | OUTPATIENT
Start: 2021-11-02 | End: 2021-11-02

## 2021-11-02 RX ORDER — HYDROCHLOROTHIAZIDE 25 MG/1
25 TABLET ORAL DAILY
Status: CANCELLED | OUTPATIENT
Start: 2021-11-03

## 2021-11-02 RX ORDER — SODIUM CHLORIDE 0.9 % (FLUSH) 0.9 %
5-40 SYRINGE (ML) INJECTION AS NEEDED
Status: CANCELLED | OUTPATIENT
Start: 2021-11-02

## 2021-11-02 RX ORDER — MONTELUKAST SODIUM 10 MG/1
10 TABLET ORAL
Status: CANCELLED | OUTPATIENT
Start: 2021-11-02

## 2021-11-02 RX ORDER — FENTANYL CITRATE 50 UG/ML
INJECTION, SOLUTION INTRAMUSCULAR; INTRAVENOUS AS NEEDED
Status: DISCONTINUED | OUTPATIENT
Start: 2021-11-02 | End: 2021-11-02 | Stop reason: HOSPADM

## 2021-11-02 RX ORDER — LOSARTAN POTASSIUM 50 MG/1
100 TABLET ORAL DAILY
Status: CANCELLED | OUTPATIENT
Start: 2021-11-03

## 2021-11-02 RX ORDER — GUAIFENESIN 100 MG/5ML
81 LIQUID (ML) ORAL EVERY 12 HOURS
Status: CANCELLED | OUTPATIENT
Start: 2021-11-02

## 2021-11-02 RX ORDER — SODIUM CHLORIDE, SODIUM LACTATE, POTASSIUM CHLORIDE, CALCIUM CHLORIDE 600; 310; 30; 20 MG/100ML; MG/100ML; MG/100ML; MG/100ML
50 INJECTION, SOLUTION INTRAVENOUS CONTINUOUS
Status: DISCONTINUED | OUTPATIENT
Start: 2021-11-02 | End: 2021-11-02 | Stop reason: HOSPADM

## 2021-11-02 RX ORDER — CEFAZOLIN SODIUM/WATER 2 G/20 ML
2 SYRINGE (ML) INTRAVENOUS EVERY 8 HOURS
Status: CANCELLED | OUTPATIENT
Start: 2021-11-02 | End: 2021-11-03

## 2021-11-02 RX ORDER — BUDESONIDE 0.5 MG/2ML
500 INHALANT ORAL
Status: CANCELLED | OUTPATIENT
Start: 2021-11-02

## 2021-11-02 RX ORDER — ONDANSETRON 2 MG/ML
INJECTION INTRAMUSCULAR; INTRAVENOUS AS NEEDED
Status: DISCONTINUED | OUTPATIENT
Start: 2021-11-02 | End: 2021-11-02 | Stop reason: HOSPADM

## 2021-11-02 RX ORDER — FENTANYL CITRATE 50 UG/ML
25 INJECTION, SOLUTION INTRAMUSCULAR; INTRAVENOUS
Status: DISCONTINUED | OUTPATIENT
Start: 2021-11-02 | End: 2021-11-02 | Stop reason: HOSPADM

## 2021-11-02 RX ORDER — KETAMINE HYDROCHLORIDE 10 MG/ML
INJECTION, SOLUTION INTRAMUSCULAR; INTRAVENOUS AS NEEDED
Status: DISCONTINUED | OUTPATIENT
Start: 2021-11-02 | End: 2021-11-02 | Stop reason: HOSPADM

## 2021-11-02 RX ORDER — FLUTICASONE PROPIONATE 50 MCG
2 SPRAY, SUSPENSION (ML) NASAL DAILY
Status: CANCELLED | OUTPATIENT
Start: 2021-11-03

## 2021-11-02 RX ORDER — OXYCODONE HYDROCHLORIDE 5 MG/1
5 TABLET ORAL
Status: DISCONTINUED | OUTPATIENT
Start: 2021-11-02 | End: 2021-11-02 | Stop reason: HOSPADM

## 2021-11-02 RX ORDER — OXYCODONE HYDROCHLORIDE 10 MG/1
10 TABLET ORAL
Qty: 28 TABLET | Refills: 0 | Status: SHIPPED | OUTPATIENT
Start: 2021-11-02 | End: 2021-11-09

## 2021-11-02 RX ORDER — ONDANSETRON 2 MG/ML
4 INJECTION INTRAMUSCULAR; INTRAVENOUS
Status: CANCELLED | OUTPATIENT
Start: 2021-11-02

## 2021-11-02 RX ORDER — DEXMEDETOMIDINE HYDROCHLORIDE 100 UG/ML
INJECTION, SOLUTION INTRAVENOUS
Status: COMPLETED | OUTPATIENT
Start: 2021-11-02 | End: 2021-11-02

## 2021-11-02 RX ORDER — SODIUM CHLORIDE 0.9 % (FLUSH) 0.9 %
5-40 SYRINGE (ML) INJECTION EVERY 8 HOURS
Status: CANCELLED | OUTPATIENT
Start: 2021-11-02

## 2021-11-02 RX ORDER — ONDANSETRON 2 MG/ML
4 INJECTION INTRAMUSCULAR; INTRAVENOUS ONCE
Status: DISCONTINUED | OUTPATIENT
Start: 2021-11-02 | End: 2021-11-02 | Stop reason: HOSPADM

## 2021-11-02 RX ORDER — DEXMEDETOMIDINE HYDROCHLORIDE 100 UG/ML
INJECTION, SOLUTION INTRAVENOUS AS NEEDED
Status: DISCONTINUED | OUTPATIENT
Start: 2021-11-02 | End: 2021-11-02 | Stop reason: HOSPADM

## 2021-11-02 RX ORDER — SODIUM CHLORIDE, SODIUM LACTATE, POTASSIUM CHLORIDE, CALCIUM CHLORIDE 600; 310; 30; 20 MG/100ML; MG/100ML; MG/100ML; MG/100ML
125 INJECTION, SOLUTION INTRAVENOUS CONTINUOUS
Status: DISCONTINUED | OUTPATIENT
Start: 2021-11-02 | End: 2021-11-02 | Stop reason: HOSPADM

## 2021-11-02 RX ORDER — GUAIFENESIN 100 MG/5ML
81 LIQUID (ML) ORAL EVERY 12 HOURS
Qty: 42 TABLET | Refills: 0 | Status: SHIPPED | OUTPATIENT
Start: 2021-11-02 | End: 2021-11-23

## 2021-11-02 RX ORDER — NALOXONE HYDROCHLORIDE 0.4 MG/ML
0.4 INJECTION, SOLUTION INTRAMUSCULAR; INTRAVENOUS; SUBCUTANEOUS AS NEEDED
Status: CANCELLED | OUTPATIENT
Start: 2021-11-02

## 2021-11-02 RX ADMIN — SODIUM CHLORIDE, SODIUM LACTATE, POTASSIUM CHLORIDE, AND CALCIUM CHLORIDE 125 ML/HR: 600; 310; 30; 20 INJECTION, SOLUTION INTRAVENOUS at 14:30

## 2021-11-02 RX ADMIN — TRANEXAMIC ACID 1 G: 10 INJECTION, SOLUTION INTRAVENOUS at 12:26

## 2021-11-02 RX ADMIN — ACETAMINOPHEN 500 MG: 500 TABLET ORAL at 15:46

## 2021-11-02 RX ADMIN — ROPIVACAINE HYDROCHLORIDE 25 ML: 5 INJECTION, SOLUTION EPIDURAL; INFILTRATION; PERINEURAL at 11:07

## 2021-11-02 RX ADMIN — PHENYLEPHRINE HYDROCHLORIDE 100 MCG: 10 INJECTION INTRAVENOUS at 12:19

## 2021-11-02 RX ADMIN — CELECOXIB 200 MG: 100 CAPSULE ORAL at 09:48

## 2021-11-02 RX ADMIN — PROPOFOL 200 MG: 10 INJECTION, EMULSION INTRAVENOUS at 11:20

## 2021-11-02 RX ADMIN — KETOROLAC TROMETHAMINE 15 MG: 15 INJECTION, SOLUTION INTRAMUSCULAR; INTRAVENOUS at 12:26

## 2021-11-02 RX ADMIN — PHENYLEPHRINE HYDROCHLORIDE 150 MCG: 10 INJECTION INTRAVENOUS at 11:54

## 2021-11-02 RX ADMIN — TRANEXAMIC ACID 1 G: 10 INJECTION, SOLUTION INTRAVENOUS at 11:27

## 2021-11-02 RX ADMIN — CEFAZOLIN 2 G: 1 INJECTION, POWDER, FOR SOLUTION INTRAVENOUS at 11:30

## 2021-11-02 RX ADMIN — ACETAMINOPHEN 1000 MG: 500 TABLET ORAL at 09:48

## 2021-11-02 RX ADMIN — MIDAZOLAM 2 MG: 1 INJECTION INTRAMUSCULAR; INTRAVENOUS at 11:02

## 2021-11-02 RX ADMIN — PHENYLEPHRINE HYDROCHLORIDE 100 MCG: 10 INJECTION INTRAVENOUS at 12:36

## 2021-11-02 RX ADMIN — KETAMINE HYDROCHLORIDE 30 MG: 10 INJECTION, SOLUTION INTRAMUSCULAR; INTRAVENOUS at 11:25

## 2021-11-02 RX ADMIN — FENTANYL CITRATE 25 MCG: 50 INJECTION, SOLUTION INTRAMUSCULAR; INTRAVENOUS at 12:32

## 2021-11-02 RX ADMIN — FENTANYL CITRATE 25 MCG: 50 INJECTION, SOLUTION INTRAMUSCULAR; INTRAVENOUS at 14:23

## 2021-11-02 RX ADMIN — LIDOCAINE HYDROCHLORIDE 60 MG: 20 INJECTION, SOLUTION INTRAVENOUS at 11:20

## 2021-11-02 RX ADMIN — DEXAMETHASONE SODIUM PHOSPHATE 8 MG: 4 INJECTION, SOLUTION INTRAMUSCULAR; INTRAVENOUS at 11:25

## 2021-11-02 RX ADMIN — SODIUM CHLORIDE, SODIUM LACTATE, POTASSIUM CHLORIDE, AND CALCIUM CHLORIDE 125 ML/HR: 600; 310; 30; 20 INJECTION, SOLUTION INTRAVENOUS at 09:35

## 2021-11-02 RX ADMIN — DEXMEDETOMIDINE HYDROCHLORIDE 8 MCG: 100 INJECTION, SOLUTION INTRAVENOUS at 11:32

## 2021-11-02 RX ADMIN — FENTANYL CITRATE 25 MCG: 50 INJECTION, SOLUTION INTRAMUSCULAR; INTRAVENOUS at 12:28

## 2021-11-02 RX ADMIN — PHENYLEPHRINE HYDROCHLORIDE 100 MCG: 10 INJECTION INTRAVENOUS at 11:25

## 2021-11-02 RX ADMIN — PHENYLEPHRINE HYDROCHLORIDE 150 MCG: 10 INJECTION INTRAVENOUS at 11:41

## 2021-11-02 RX ADMIN — DEXMEDETOMIDINE HYDROCHLORIDE 20 MCG: 100 INJECTION, SOLUTION INTRAVENOUS at 11:07

## 2021-11-02 RX ADMIN — SODIUM CHLORIDE, SODIUM LACTATE, POTASSIUM CHLORIDE, AND CALCIUM CHLORIDE: 600; 310; 30; 20 INJECTION, SOLUTION INTRAVENOUS at 12:17

## 2021-11-02 RX ADMIN — ONDANSETRON HYDROCHLORIDE 4 MG: 2 INJECTION INTRAMUSCULAR; INTRAVENOUS at 12:26

## 2021-11-02 NOTE — DISCHARGE INSTRUCTIONS
Dr. Fabrice Brown Operative Instructions Total Knee Replacement    ACTIVITIES :  1. You may be up and walking about the house with your walker. You may remove your knee immobilizer 24-48 hours postoperatively. 2.  Activities around the house, such as washing dishes, fixing light meals, and your own personal care are fine. 3.  Avoid strenuous activities, such as vacuuming, lifting laundry or grocery bags. 4.  Walking is the best way to rebuild strength and stamina. Start SLOWLY and gradually increase your distance. 5.  Avoid any jogging, running or excessive stair-climbing   6. Your home physical therapist will work with you and your range-of-motion for the first 7-14 days. After your first visit with Dr. Victorino Dillon you will be scheduled for out-patient physical therapy at a site convenient for you. 7.  Follow-up with Dr. Victorino Dillon in 10-14 days. BATHING and INCISION CARE:  1. Do not remove bandage until first post-op visit in office  2. The incision may be tender to touch or feel numb: this is normal.   3.  Keep the incision clean and dry no showering until your follow-up appointment. The incision will be closed with sutures under the skin and the skin will be glued. 4.  Do not apply any lotions, ointments or oils on the incision. 5.  If you notice any excessive swelling, redness, or persistent drainage around the incision, notify the office immediately. DRIVIN. You should not drive until after your follow-up appointment. 2.  You can be in a vehicle for short distances, but if you travel any long distance, please stop about every 30 minutes and walk/stretch. 3.  You should NEVER drive while taking narcotic medication. 4.  Driving will be permitted on right knee replacements after the therapist has confirmed a range-of-motion of a 105 degrees. Left knee replacements may drive at 2 weeks post-op. RETURN TO WORK :  1.  The decision to return to work will be determined on an individual basis. 2.  Many people who have a strenuous job (construction, heavy labor, etc) may need to be off work for up to 12 weeks. 3.  If you need a work note, please let us know as soon as possible, and not the same day you are planning to return to work. NUTRITION :  1.  Good nutrition is an essential part of healing. 2.  You should eat a balanced diet each day, including fruits, vegetables, dairy products and protein. 3.  Remember to drink plenty of water. 4.  If you have not had a bowel movement within 3 days of surgery, you will need to use a laxative or suppository that can be obtained over-the-counter at your local pharmacy. MEDICATIONS -  1. You may resume the medications you were taking before surgery. 2.  You will receive a prescription for pain medication at discharge from the hospital. The pain medication works best if taken before the pain becomes severe. 3.  To reduce stomach upset, always take the medication with food. 4.  Begin to wean yourself off the pain medication during the second week after discharge. 5.  If you need a refill, please call the office during working hours at least 2 days before your prescription runs out. Do not wait until your bottle is empty to call for a refill. 6.  You will also be prescribed a blood thinner that you will take by injection for 21 days post-operatively. 7.  DO NOT drive if you are taking narcotic pain medications. HOME HEALTH CARE:  1.   A home health care service has been set-up for you to help assist you once you leave the hospital.  2.  They will contact you either before you leave the hospital or within 24 hours once you have been discharged home. 3. A nurse will assist you with your dressing changes and a Physical Therapist with help you with your therapy needs.     CALL THE OFFICE:   If you have severe pain unrelieved by the medications;   If you have a fever of 101.0°F or greater;    If you notice excessive swelling, redness, or persistent drainage from the incision or IV site; [facility] office number is [phone] from 8:00am to 5:00pm Monday through Friday. After 5:00pm, on weekends, or holidays, please leave a message with our answering service and the doctor on-call will get back to you shortly. DISCHARGE SUMMARY from Nurse    PATIENT INSTRUCTIONS:    After general anesthesia or intravenous sedation, for 24 hours or while taking prescription Narcotics:  · Limit your activities  · Do not drive and operate hazardous machinery  · Do not make important personal or business decisions  · Do  not drink alcoholic beverages  · If you have not urinated within 8 hours after discharge, please contact your surgeon on call. Report the following to your surgeon:  · Excessive pain, swelling, redness or odor of or around the surgical area  · Temperature over 100.5  · Nausea and vomiting lasting longer than 4 hours or if unable to take medications  · Any signs of decreased circulation or nerve impairment to extremity: change in color, persistent  numbness, tingling, coldness or increase pain  · Any questions    What to do at Home:  Recommended activity: Activity as directed by surgeon,     If you experience any of the following symptoms as per above, please follow up with your surgeon. *  Please give a list of your current medications to your Primary Care Provider. *  Please update this list whenever your medications are discontinued, doses are      changed, or new medications (including over-the-counter products) are added. *  Please carry medication information at all times in case of emergency situations. These are general instructions for a healthy lifestyle:    No smoking/ No tobacco products/ Avoid exposure to second hand smoke  Surgeon General's Warning:  Quitting smoking now greatly reduces serious risk to your health.     Obesity, smoking, and sedentary lifestyle greatly increases your risk for illness    A healthy diet, regular physical exercise & weight monitoring are important for maintaining a healthy lifestyle    You may be retaining fluid if you have a history of heart failure or if you experience any of the following symptoms:  Weight gain of 3 pounds or more overnight or 5 pounds in a week, increased swelling in our hands or feet or shortness of breath while lying flat in bed. Please call your doctor as soon as you notice any of these symptoms; do not wait until your next office visit. Patient armband removed and shredded      The discharge information has been reviewed with the patient and caregiver. The patient and caregiver verbalized understanding. Discharge medications reviewed with the patient and caregiver and appropriate educational materials and side effects teaching were provided.   ___________________________________________________________________________________________________________________________________

## 2021-11-02 NOTE — PERIOP NOTES
Reviewed discharge instructions with patient and her caregiver, understanding verbalized. Dressing clean dry and intact with immobilizer in place. Reviewed medications for home and home care-total knee post op booklet was given to patient.

## 2021-11-02 NOTE — PERIOP NOTES
Reviewed PTA medication list with patient/caregiver and patient/caregiver denies any additional medications. Patient admits to having a responsible adult care for them at home for at least 24 hours after surgery. Patient encouraged to use gown warming system and informed that using said warming gown to regulate body temperature prior to a procedure has been shown to help reduce the risks of blood clots and infection. Patient's pharmacy of choice verified and documented in PTA medication section. Dual skin assessment & fall risk band verification completed with Nidia Rabago RN.

## 2021-11-02 NOTE — OP NOTES
OPERATIVE NOTE    Patient: Steven Stewart MRN: [id number]  SSN: [id number]    YOB: 1960  Age: 62 y.o. Sex: female      Indications: This is a 62y.o. year-old female who presents with knee pain. The patient was admitted for surgery as conservative measures have failed. Date of Procedure: 11/2/2021     Preoperative Diagnosis: RIGHT KNEE PAIN,OSTEOARTHRITIS RIGHT KNEE    Postoperative Diagnosis: RIGHT KNEE PAIN,OSTEOARTHRITIS RIGHT KNEE      Procedure: Procedure(s):  RIGHT TOTAL KNEE ARTHROPLASTY **SPEC POP**    Surgeon(s) and Role:     * Jordan Joyce DO - Primary    Anesthesia: general    Surgeon: Emili Miller DO, and Surgical Assistant:  Aide Ballard PA-C    Estimated Blood Loss: 20ml  IVF 1500 ml  TT 55 min 300 mmhg    Specimens: * No specimens in log *     Implants:   Implant Name Type Inv. Item Serial No.  Lot No. LRB No. Used Action   CEMENT BNE 20GM HALF DOSE PMMA W/ GENT HI VISC RADPQ FAST - [id number]  CEMENT BNE 20GM HALF DOSE PMMA W/ GENT HI VISC RADPQ FAST  [employer]WD [id number] Right 3 Implanted   PAT BCNVX UHMWPE 29MM -- FOREST II - [id number]  PAT BCNVX UHMWPE 29MM -- Noreen [employer] [id number] Right 1 Implanted   BASEPLT FEM NP 2 RT -- FOREST II - [id number]  BASEPLT FEM NP 2 RT -- FOREST II  [employer] [id number] Right 1 Implanted   COMPONENT FEM SZ 4 R KNEE OXINIUM CRUCE RET KANDY LEGION - [id number]  COMPONENT FEM SZ 4 R KNEE OXINIUM CRUCE RET KANDY LEGION  SM[employer]WD [id number] Right 1 Implanted   INSERT TIB SZ 1-2 [id number] CRUC RET HI FLX LEGION - [id number]  INSERT TIB SZ 1-2 [id number] CRUC RET HI FLX LEGION  SM[employer]WD [id number] Right 1 Implanted       Complications: None; patient tolerated the procedure well. Procedure:  The patient was greeted by Anesthesia and taken to the operative suite, where the patient underwent general endotracheal anesthesia. Tourniquet was placed in the upper right thigh. The leg was sterilely prepped and draped in a standard fashion. The leg was exsanguinated with an Esmarch bandage and tourniquet was elevated to 300mmHg. An incision was carried out centered over the patella, extending two fingerbreadth's over the patella and to the level of the tibial tubercle. A medial parapatellar approach was utilized. The knee was taken into flexion. Intramedullary femoral access was obtained, and an 8 mm distal femoral cut with a 6 degree cut angle was utilized. The femur was sized to a 4, and a four-in-one cutting block was utilized to make the appropriate bone cuts. A size 4 femoral cruciate retaining trial was placed and found to be an excellent fit. The knee was taken into hyperflexion. The medial and lateral meniscus, as well as the anterior cruciate ligament were resect ed. The posterior cruciate ligament was preserved. Retractors were positioned to protect the soft tissue and neurovascular structures. An external tibial cutting guide was placed in line with the tibial tubercle, tibial spine and middle of the ankle joint, and the second metatarsal with slight 3 degree posterior slope. The proximal tibia was removed with an oscillating saw. A size 2 tibial baseplate was found to be an excellent fit. A 9mm trial polyethylene was placed and the knee was taken into extension, mm of the articular surface of the patella was resected with an oscillating saw and a 29mm x 8mm symmetric patella trial was placed. The knee was taken through range of motion thumbs off technique. There was no subluxation or dislocation of the patella. The knee ranged from 0 degrees of extension to 125 degrees of flexion. There was no instability with varus valgus stress testing with a 9mm polyethylene. The femur was drilled. The tibia was punched. All trial components were removed.   The posterior capsule and geniculate vessels were cauterized with the [employer] cautery system to prevent postoperative bleeding. The tissues were irrigated with 1500ml of sterile saline with Bacitracin utilizing pulsatile lavage. Next, the [facility] #4 cruciate retaining femur, a #2 tibial baseplate, and a 56GP X 8mm symmetric patella were placed with methylacrylate bonding. After all cement had hardened, the excess cement was removed and a [employer] 9mm cruciate retaining polyethylene insert was impacted into position. The knee was taken through a range of motion from 0 degrees of extension to 125+ degrees of flexion. There was no instability throughout range of motion and the patella tracked without subluxation. The tissues were irrigated a second time with 1500ml of sterile saline utilizing pulsatile lavage. No drain was necessary. The capsule was re approximated with running locked #1 PDS. The skin edges were re approximated with Number 1 Vicryl in a subcutaneous fashion and the skin was closed with 2-0 Vicryl in subcuticular fashion. The skin was then sealed with the [address] and then the leg was dressed with a Mepilex dressing and ACE wrap. The patient recovered from anesthesia and was transferred to the postanesthesia care unit in stable condition.       Greta Lawton DO  11/2/2021  1:39 PM

## 2021-11-02 NOTE — PERIOP NOTES
Patient tolerated nerve block to right knee well. Vital signs stable. Resting quietly. Call bell within reach. Monitoring pt closely.

## 2021-11-02 NOTE — ANESTHESIA POSTPROCEDURE EVALUATION
Post-Anesthesia Evaluation and Assessment    Cardiovascular Function/Vital Signs  Visit Vitals  /78   Pulse 80   Temp 36.1 °C (97 °F)   Resp 13   Ht 5' 2\" (1.575 m)   Wt 80.4 kg (177 lb 4.8 oz)   SpO2 100%   BMI 32.43 kg/m²       Patient is status post Procedure(s):  RIGHT TOTAL KNEE ARTHROPLASTY **SPEC POP**. Nausea/Vomiting: Controlled. Postoperative hydration reviewed and adequate. Pain:  Pain Scale 1: FLACC (11/02/21 1433)  Pain Intensity 1: 0 (11/02/21 1433)   Managed. Neurological Status:   Neuro (WDL): Within Defined Limits (11/02/21 1420)   At baseline. Mental Status and Level of Consciousness: Baseline and appropriate for discharge. Pulmonary Status:   O2 Device: Nasal cannula (11/02/21 1433)   Adequate oxygenation and airway patent. Complications related to anesthesia: None    Post-anesthesia assessment completed. No concerns. Patient has met all discharge requirements.     Signed By: Usha Conklin MD    November 2, 2021

## 2021-11-02 NOTE — ANESTHESIA PREPROCEDURE EVALUATION
Relevant Problems   RESPIRATORY SYSTEM   (+) Asthma exacerbation   (+) CAP (community acquired pneumonia)   (+) Community acquired pneumonia of right lower lobe of lung   (+) Moderate persistent asthma with acute exacerbation      NEUROLOGY   (+) Seizures (HCC)      CARDIOVASCULAR   (+) HTN (hypertension)      RENAL FAILURE   (+) OPAL (acute kidney injury) ([facility] [address]       Anesthetic History   No history of anesthetic complications            Review of Systems / Medical History  Patient summary reviewed, nursing notes reviewed and pertinent labs reviewed    Pulmonary        Sleep apnea: CPAP    Asthma        Neuro/Psych     seizures (petit mal - last was a few weeks ago)    TIA and headaches  Pertinent negatives: No CVA   Cardiovascular    Hypertension          Hyperlipidemia    Exercise tolerance: >4 METS     GI/Hepatic/Renal             Pertinent negatives: No PUD   Endo/Other        Arthritis  Pertinent negatives: No diabetes (denies)   Other Findings            Physical Exam    Airway  Mallampati: II  TM Distance: 4 - 6 cm  Neck ROM: normal range of motion   Mouth opening: Normal     Cardiovascular               Dental  No notable dental hx       Pulmonary                 Abdominal         Other Findings            Anesthetic Plan    ASA: 3  Anesthesia type: general      Post-op pain plan if not by surgeon: peripheral nerve block single    Induction: Intravenous  Anesthetic plan and risks discussed with: Patient      Adductor Canal Block - risks/benefits explained: infection, nerve injury, bleeding, failed block - she wishes to proceed.   PCP and Neurology clearances in chart

## 2021-11-02 NOTE — INTERVAL H&P NOTE
Update History & Physical 
 
The Patient's History and Physical was reviewed with the patient and I examined the patient. There was no change. The surgical site was confirmed by the patient and me. Plan:  The risk, benefits, expected outcome, and alternative to the recommended procedure have been discussed with the patient. Patient understands and wants to proceed with the procedure.  
 
Electronically signed by Alexys Benavides DO on 11/2/2021 at 7:25 AM

## 2021-11-02 NOTE — ANESTHESIA PROCEDURE NOTES
Peripheral Block    Start time: 11/2/2021 11:02 AM  End time: 11/2/2021 11:07 AM  Performed by: Tanisha Whiting MD  Authorized by: Tanisha Whiting MD       Pre-procedure: Indications: at surgeon's request and post-op pain management    Preanesthetic Checklist: patient identified, risks and benefits discussed, site marked, timeout performed, anesthesia consent given and patient being monitored    Timeout Time: 11:02 EDT          Block Type:   Block Type:   Adductor canal block  Laterality:  Right  Monitoring:  Standard ASA monitoring, continuous pulse ox, frequent vital sign checks, heart rate, oxygen and responsive to questions  Injection Technique:  Single shot  Procedures: ultrasound guided    Patient Position: supine  Prep: DuraPrep    Location:  Mid thigh  Needle Type:  Stimuplex  Needle Gauge:  20 G  Needle Localization:  Ultrasound guidance (ultrasound used for needle placement and visualization of local anesthetic spread)  Medication Injected:  Ropivacaine (PF) (NAROPIN) 5 mg/mL (0.5 %) injection, 25 mL  dexmedeTOMidine (PRECEDEX) 100 mcg/mL iv solution, 20 mcg  Med Admin Time: 11/2/2021 11:07 AM    Assessment:  Number of attempts:  1  Injection Assessment:  Incremental injection every 5 mL, local visualized surrounding nerve on ultrasound, negative aspiration for blood, no intravascular symptoms, no paresthesia and ultrasound image on chart  Patient tolerance:  Patient tolerated the procedure well with no immediate complications

## 2021-11-02 NOTE — BRIEF OP NOTE
Brief Postoperative Note    Patient: Andres Rivera  YOB: 1960  MRN: [id number]    Date of Procedure: 11/2/2021     Pre-Op Diagnosis: RIGHT KNEE PAIN,OSTEOARTHRITIS RIGHT KNEE    Post-Op Diagnosis: Same as preoperative diagnosis. Procedure(s):  RIGHT TOTAL KNEE ARTHROPLASTY **SPEC POP**    Surgeon(s):  Bg Campos DO    Surgical Assistant: Physician Assistant: Morteza Young PA-C    Anesthesia: General     Estimated Blood Loss (mL): less than 50     Complications: None    Specimens: * No specimens in log *     Implants:   Implant Name Type Inv.  Item Serial No.  Lot No. LRB No. Used Action   CEMENT BNE 20GM HALF DOSE PMMA W/ GENT HI VISC RADPQ FAST - [id number]  CEMENT BNE 20GM HALF DOSE PMMA W/ GENT HI VISC RADPQ FAST  [employer]_ [id number] Right 3 Implanted   PAT BCNVX UHMWPE 29MM -- FOREST II - [id number]  PAT BCNVX UHMWPE 29MM -- [facility] [id number] Right 1 Implanted   BASEPLT FEM NP 2 RT -- FOREST II - [id number]  BASEPLT FEM NP 2 RT -- FOREST II  [employer] [id number] Right 1 Implanted   COMPONENT FEM SZ 4 R KNEE OXINIUM CRUCE RET KANDY LEGION - [id number]  COMPONENT FEM SZ 4 R KNEE OXINIUM CRUCE RET KANDY LEGION  [employer]_ [id number] Right 1 Implanted   INSERT TIB SZ 1-2 SUM2AFVRST CRUC RET HI FLX LEGION - [id number]  INSERT TIB SZ 1-2 [id number] CRUC RET HI FLX LEGION  [employer]_ [id number] Right 1 Implanted       Drains: * No LDAs found *    Findings: severe oa knee    Electronically Signed by Greta Lawton DO on 11/2/2021 at 1:38 PM

## 2021-11-03 NOTE — PROGRESS NOTES
Problem: Mobility Impaired (Adult and Pediatric)  Goal: *Acute Goals and Plan of Care (Insert Text)  Description: Physical Therapy Goals   Initiated 11/2/2021 and to be accomplished within 1-2 day(s)  Pt will be able to perform supine<>sit SBA for home performance at [place]. Pt will be able to perform sit<>stand SBA for increased ability to transfer at home safely. Pt will be able to participate in gt training >50' w/RW, WBAT, GB and CGA/SBA for improved functional mobility at d/c. Pt will be able to perform stair training 1-2 steps using step to pattern, HR rail and CGA for safe home entry at d/c. Pt will be educated regarding HEP per MD protocol for optimal AROM/strength outcomes. Outcome: Progressing Towards Goal  [x]  Patient has met MD mobilization critieria for d/c home   [x]  Recommend HH with 24 hour adult care   []  Benefit from additional acute PT session to address:      PHYSICAL THERAPY EVALUATION    Patient: Liat Willett (35 y.o. female)  Date: 11/2/2021 (late entry)  Primary Diagnosis: RIGHT KNEE PAIN,OSTEOARTHRITIS RIGHT KNEE  Procedure(s) (LRB):  RIGHT TOTAL KNEE ARTHROPLASTY **SPEC POP** (Right) Day of Surgery   Precautions:   Fall, WBAT, Other (comment) (KI R LE)  WBAT  PLOF: independent amb    ASSESSMENT :  Based on the objective data described below, the patient presents with decreased ROM/motor performance RLE, decrease functional mobility independence with regard to bed mobility, transfers, and gait following above surgery. Reports pain 7-8/10 pre, 9-10/10 post session. Pt found with KI in place and with spouse present in room. Pt demonstrates transfers CGA/RW/vc for safety. Able to participate in GT/RW/CGA  ~52ft in lopez with slow, antalgic step to gt pattern. Pt educated in step nego and performed same with box step using RW as noted below x 2 reps.   On second rep pt R LE with decr'd stability in stance while ascending step with L LE and requiring min assist for safety completion of task. At this point, pt spouse stated he would just use the w/c and ramp available to get pt back into house. Pt returned to room and left back in recline with all needs in reach and nurse [facility] notified. Recommend HHPT for follow up physical therapy upon discharge to reach maximal level of independence/safety with functional mobility. Patient education: Mobility safety, WB, HEP, ice application/use, elevation, environmental safety and home safety techniques. Patient will benefit from skilled intervention to address the above impairments. Patient's rehabilitation potential is considered to be Good  Factors which may influence rehabilitation potential include:   []         None noted  []         Mental ability/status  []         Medical condition  []         Home/family situation and support systems  []         Safety awareness  [x]         Pain tolerance/management  []         Other:      PLAN :  Recommendations and Planned Interventions:   []           Bed Mobility Training             []    Neuromuscular Re-Education  [x]           Transfer Training                   []    Orthotic/Prosthetic Training  [x]           Gait Training                          []    Modalities  [x]           Therapeutic Exercises           []    Edema Management/Control  [x]           Therapeutic Activities            []    Family Training/Education  [x]           Patient Education  []           Other (comment):    Frequency/Duration: Patient will be followed by physical therapy 1-2 times per day/4-7 days per week to address goals. Discharge Recommendations: Home Physical Therapy  Further Equipment Recommendations for Discharge: N/A     SUBJECTIVE:   Patient stated I'll try.     OBJECTIVE DATA SUMMARY:     Past Medical History:   Diagnosis Date    Arthritis     Asthma     Cancer ([facility] [address]     ovarian    Chronic kidney disease     deformed right  kidney    Diabetes ([facility] [address]     patient denies    Dizziness     Hypertension     Migraines     PUD (peptic ulcer disease)     H/O, no meds at this time 2016    Seizures [facility])     last seizure Sept 2021    Stroke [facility]) 2006     mild weakness right hand    Unspecified sleep apnea     uses cpap     Past Surgical History:   Procedure Laterality Date    HX APPENDECTOMY      HX COLONOSCOPY      HX GYN      hysterectomy    HX OTHER SURGICAL      mole removed from neck     Barriers to Learning/Limitations: yes;  physical  Compensate with: Verbal Cues and Tactile Cues  Home Situation:  Home Situation  Home Environment: Private residence  # Steps to Enter: 3  Rails to Enter: No  Wheelchair Ramp: Yes  One/Two Story Residence: One story  Living Alone: No  Support Systems: Spouse/Significant Other  Patient Expects to be Discharged to[de-identified] [employer]  Current DME Used/Available at Home: Walker, rolling, Grab bars  Tub or Shower Type: Shower (with seat )  Critical Behavior:  Neurologic State: Alert; Appropriate for age  Orientation Level: Oriented X4  Cognition: Follows commands; Appropriate safety awareness; Appropriate for age attention/concentration  Safety/Judgement: Awareness of environment; Fall prevention  Psychosocial  Patient Behaviors: Calm; Cooperative  Family  Behaviors: Calm;Supportive  Skin Condition/Temp: Dry  Family  Behaviors: Calm;Supportive  Skin Integrity: Incision (comment)  Skin Integumentary  Skin Color: Appropriate for ethnicity  Skin Condition/Temp: Dry  Skin Integrity: Incision (comment)  Strength:    Strength: Generally decreased, functional  Tone & Sensation:   Sensation: Intact  Range Of Motion:  AROM: Generally decreased, functional  Functional Mobility:  Bed Mobility:  Scooting: Supervision  Transfers:  Sit to Stand: Contact guard assistance  Stand to Sit: Contact guard assistance  Balance:   Sitting: Intact  Standing: Intact; With support  Ambulation/Gait Training:  Distance (ft): 52 Feet (ft)  Assistive Device: Gait belt;Walker, rolling;Brace/Splint (KI R LE)  Ambulation - Level of Assistance: Contact guard assistance  Gait Description (WDL): Exceptions to WDL  Gait Abnormalities: Antalgic;Decreased step clearance; Step to gait  Right Side Weight Bearing: As tolerated  Speed/Dafne: Slow  Step Length: Right shortened;Left shortened  Swing Pattern: Right asymmetrical;Left asymmetrical  Interventions: Safety awareness training; Tactile cues; Verbal cues; Visual/Demos  Stairs:  Number of Stairs Trained: 2 (box step)  Stairs - Level of Assistance: Minimum assistance  Rail Use: Both (with RW)  Therapeutic Exercises:   Educated in ankle pumps and performs same accurately  Pain:  Pain level pre-treatment: 7-8 /10   Pain level post-treatment: 9-10/10   Pain Intervention(s) : Medication (see MAR); Rest, Ice, Repositioning  Response to intervention: Nurse notified, See doc flow  Activity Tolerance:   Fair   Please refer to the flowsheet for vital signs taken during this treatment. After treatment:   [x]         Patient left in no apparent distress sitting up in recliner chair  []         Patient left in no apparent distress in bed  [x]         Call bell left within reach  [x]         Nursing notified  [x]         Caregiver present  []         Bed alarm activated  []         SCDs applied    COMMUNICATION/EDUCATION:   [x]         Role of Physical Therapy in the acute care setting. [x]         Fall prevention education was provided and the patient/caregiver indicated understanding. [x]         Patient/family have participated as able in goal setting and plan of care. [x]         Patient/family agree to work toward stated goals and plan of care. []         Patient understands intent and goals of therapy, but is neutral about his/her participation. []         Patient is unable to participate in goal setting/plan of care: ongoing with therapy staff.  []         Other:     Thank you for this referral.  Bola Huffman, PT   Time Calculation: 29 mins      Eval Complexity: History: HIGH Complexity :3+ comorbidities / personal factors will impact the outcome/ POC Exam:MEDIUM Complexity : 3 Standardized tests and measures addressing body structure, function, activity limitation and / or participation in recreation  Presentation: MEDIUM Complexity : Evolving with changing characteristics  Clinical Decision Making:Low Complexity  Overall Complexity:LOW

## 2021-11-03 NOTE — PROGRESS NOTES
OCCUPATIONAL THERAPY EVALUATION/DISCHARGE    Patient: Lucy Her (13 y.o. female)  Date: 11/2/2021  Primary Diagnosis: RIGHT KNEE PAIN,OSTEOARTHRITIS RIGHT KNEE  Procedure(s) (LRB):  RIGHT TOTAL KNEE ARTHROPLASTY **SPEC POP** (Right) Day of Surgery   Precautions:   Fall, WBAT, Other (comment) (LIS ROBERTS)  PLOF: independent with ADLs and transfers     ASSESSMENT AND RECOMMENDATIONS:  Based on the objective data described below, the patient presents with requiring S-SBA for ADLs and transfers following above mentioned surgical procedure. Pt presented seated in chair at the beginning of session and agrees to participate with therapy. Pt co-treat with PT for the need of another set of skilled hands and safety with transfers/ADLs. Pt participate with UB and LB dressing, sit<>stand transfers, and ambulate with PT for carryover of ADLs and transfers. Pt was left seated in chair at the end of session in NAD. Skilled occupational therapy is not indicated at this time. Discharge Recommendations: Home Health  Further Equipment Recommendations for Discharge: N/A      SUBJECTIVE:   Patient stated  I am doing alright.     OBJECTIVE DATA SUMMARY:     Past Medical History:   Diagnosis Date    Arthritis     Asthma     Cancer ([facility] [address]     ovarian    Chronic kidney disease     deformed right  kidney    Diabetes ([facility] [address]     patient denies    Dizziness     Hypertension     Migraines     PUD (peptic ulcer disease)     H/O, no meds at this time 2016    Seizures [facility])     last seizure Sept 2021    Stroke [facility]) 2006     mild weakness right hand    Unspecified sleep apnea     uses cpap     Past Surgical History:   Procedure Laterality Date    HX APPENDECTOMY      HX COLONOSCOPY      HX GYN      hysterectomy    HX OTHER SURGICAL      mole removed from neck     Barriers to Learning/Limitations: None  Compensate with: visual, verbal, tactile, kinesthetic cues/model    Home Situation:   Home Situation  Home Environment: Private residence  # Steps to Enter: 3  Rails to Enter: No  Wheelchair Ramp: Yes  One/Two Story Residence: One story  Living Alone: No  Support Systems: Spouse/Significant Other  Patient Expects to be Discharged to[de-identified] House  Current DME Used/Available at Home: Walker, rolling, Grab bars  Tub or Shower Type: Shower (with seat )  []     Right hand dominant   []     Left hand dominant    Cognitive/Behavioral Status:  Neurologic State: Alert; Appropriate for age  Orientation Level: Oriented X4  Cognition: Follows commands; Appropriate safety awareness; Appropriate for age attention/concentration  Safety/Judgement: Awareness of environment; Fall prevention    Skin: intact  Edema: none    Vision/Perceptual:    Tracking: Able to track stimulus in all quadrants w/o difficulty    Coordination: BUE  Coordination: Within functional limits  Fine Motor Skills-Upper: Left Intact; Right Intact    Gross Motor Skills-Upper: Left Intact; Right Intact  Balance:  Sitting: Intact  Standing: Intact; With support  Strength: BUE  Strength: Generally decreased, functional  Tone & Sensation: BUE  Sensation: Intact  Range of Motion: BUE  AROM: Generally decreased, functional  Functional Mobility and Transfers for ADLs:  Bed Mobility:  Scooting: Supervision  Transfers:  Sit to Stand: Contact guard assistance  Stand to Sit: Contact guard assistance  ADL Assessment:  Feeding: Independent    Oral Facial Hygiene/Grooming: Stand-by assistance    Upper Body Dressing: Stand-by assistance    Lower Body Dressing: Stand-by assistance    Toileting: Stand by assistance  ADL Intervention:  Upper Body Dressing Assistance  Dressing Assistance: Supervision  Pullover Shirt: Supervision    Lower Body Dressing Assistance  Dressing Assistance: Stand-by assistance;Supervision  Underpants: Stand-by assistance;Supervision  Pants With Elastic Waist: Stand-by assistance  Leg Crossed Method Used: No  Position Performed: Seated in chair  Cues: Don    Cognitive Retraining  Safety/Judgement: Awareness of environment; Fall prevention  Pain:  Pain level pre-treatment: 3/10   Pain level post-treatment: 3/10   Pain Intervention(s): Medication (see MAR); Rest, Ice, Repositioning   Response to intervention: Nurse notified, See doc flow    Activity Tolerance:   Good     Please refer to the flowsheet for vital signs taken during this treatment. After treatment:   [x]  Patient left in no apparent distress sitting up in chair  []  Patient left in no apparent distress in bed  [x]  Call bell left within reach  [x]  Nursing notified  [x]  Caregiver present  []  Bed alarm activated    COMMUNICATION/EDUCATION:   [x]      Role of Occupational Therapy in the acute care setting  [x]      Home safety education was provided and the patient/caregiver indicated understanding. [x]      Patient/family have participated as able and agree with findings and recommendations. []      Patient is unable to participate in plan of care at this time. Thank you for this referral.  Merly Fuchs, OTR/L  Time Calculation: 30 mins      Eval Complexity: History: LOW Complexity : Brief history review ; Examination: LOW Complexity : 1-3 performance deficits relating to physical, cognitive , or psychosocial skils that result in activity limitations and / or participation restrictions ;    Decision Making:LOW Complexity : No comorbidities that affect functional and no verbal or physical assistance needed to complete eval tasks

## 2022-03-17 ENCOUNTER — HOSPITAL ENCOUNTER (EMERGENCY)
Age: 62
Discharge: HOME | End: 2022-03-17
Attending: STUDENT IN AN ORGANIZED HEALTH CARE EDUCATION/TRAINING PROGRAM
Payer: COMMERCIAL

## 2022-03-17 ENCOUNTER — APPOINTMENT (OUTPATIENT)
Dept: CT IMAGING | Age: 62
End: 2022-03-17
Attending: STUDENT IN AN ORGANIZED HEALTH CARE EDUCATION/TRAINING PROGRAM
Payer: COMMERCIAL

## 2022-03-17 VITALS
OXYGEN SATURATION: 100 % | DIASTOLIC BLOOD PRESSURE: 66 MMHG | BODY MASS INDEX: 33.27 KG/M2 | TEMPERATURE: 98.1 F | HEIGHT: 62 IN | RESPIRATION RATE: 16 BRPM | SYSTOLIC BLOOD PRESSURE: 162 MMHG | WEIGHT: 180.78 LBS | HEART RATE: 59 BPM

## 2022-03-17 DIAGNOSIS — Z87.19 HISTORY OF DIVERTICULITIS: ICD-10-CM

## 2022-03-17 DIAGNOSIS — N30.00 ACUTE CYSTITIS WITHOUT HEMATURIA: ICD-10-CM

## 2022-03-17 DIAGNOSIS — R10.32 ABDOMINAL PAIN, LLQ (LEFT LOWER QUADRANT): Primary | ICD-10-CM

## 2022-03-17 DIAGNOSIS — K29.90 GASTRITIS AND DUODENITIS: ICD-10-CM

## 2022-03-17 LAB
ALBUMIN SERPL-MCNC: 3.7 G/DL (ref 3.4–5)
ALBUMIN/GLOB SERPL: 1 {RATIO} (ref 0.8–1.7)
ALP SERPL-CCNC: 112 U/L (ref 45–117)
ALT SERPL-CCNC: 14 U/L (ref 13–56)
ANION GAP SERPL CALC-SCNC: 5 MMOL/L (ref 3–18)
APPEARANCE UR: CLEAR
AST SERPL-CCNC: 16 U/L (ref 10–38)
ATRIAL RATE: 54 BPM
BACTERIA URNS QL MICRO: ABNORMAL /HPF
BASOPHILS # BLD: 0 K/UL (ref 0–0.1)
BASOPHILS NFR BLD: 0 % (ref 0–2)
BILIRUB SERPL-MCNC: 0.3 MG/DL (ref 0.2–1)
BILIRUB UR QL: NEGATIVE
BUN SERPL-MCNC: 8 MG/DL (ref 7–18)
BUN/CREAT SERPL: 7 (ref 12–20)
CALCIUM SERPL-MCNC: 9.8 MG/DL (ref 8.5–10.1)
CALCULATED P AXIS, ECG09: 48 DEGREES
CALCULATED R AXIS, ECG10: 31 DEGREES
CALCULATED T AXIS, ECG11: 55 DEGREES
CHLORIDE SERPL-SCNC: 99 MMOL/L (ref 100–111)
CO2 SERPL-SCNC: 32 MMOL/L (ref 21–32)
COLOR UR: YELLOW
CREAT SERPL-MCNC: 1.07 MG/DL (ref 0.6–1.3)
DIAGNOSIS, 93000: NORMAL
DIFFERENTIAL METHOD BLD: ABNORMAL
EOSINOPHIL # BLD: 0.1 K/UL (ref 0–0.4)
EOSINOPHIL NFR BLD: 3 % (ref 0–5)
EPITH CASTS URNS QL MICRO: ABNORMAL /LPF (ref 0–5)
ERYTHROCYTE [DISTWIDTH] IN BLOOD BY AUTOMATED COUNT: 12 % (ref 11.6–14.5)
GLOBULIN SER CALC-MCNC: 3.6 G/DL (ref 2–4)
GLUCOSE SERPL-MCNC: 99 MG/DL (ref 74–99)
GLUCOSE UR STRIP.AUTO-MCNC: NEGATIVE MG/DL
HCT VFR BLD AUTO: 36.8 % (ref 35–45)
HGB BLD-MCNC: 12 G/DL (ref 12–16)
HGB UR QL STRIP: NEGATIVE
IMM GRANULOCYTES # BLD AUTO: 0 K/UL (ref 0–0.04)
IMM GRANULOCYTES NFR BLD AUTO: 0 % (ref 0–0.5)
KETONES UR QL STRIP.AUTO: ABNORMAL MG/DL
LEUKOCYTE ESTERASE UR QL STRIP.AUTO: NEGATIVE
LIPASE SERPL-CCNC: 72 U/L (ref 73–393)
LYMPHOCYTES # BLD: 1.9 K/UL (ref 0.9–3.6)
LYMPHOCYTES NFR BLD: 43 % (ref 21–52)
MCH RBC QN AUTO: 30.5 PG (ref 24–34)
MCHC RBC AUTO-ENTMCNC: 32.6 G/DL (ref 31–37)
MCV RBC AUTO: 93.4 FL (ref 78–100)
MONOCYTES # BLD: 0.3 K/UL (ref 0.05–1.2)
MONOCYTES NFR BLD: 7 % (ref 3–10)
NEUTS SEG # BLD: 2.1 K/UL (ref 1.8–8)
NEUTS SEG NFR BLD: 47 % (ref 40–73)
NITRITE UR QL STRIP.AUTO: POSITIVE
NRBC # BLD: 0 K/UL (ref 0–0.01)
NRBC BLD-RTO: 0 PER 100 WBC
P-R INTERVAL, ECG05: 164 MS
PH UR STRIP: >8.5 [PH] (ref 5–8)
PLATELET # BLD AUTO: 393 K/UL (ref 135–420)
PMV BLD AUTO: 9.9 FL (ref 9.2–11.8)
POTASSIUM SERPL-SCNC: 3.4 MMOL/L (ref 3.5–5.5)
PROT SERPL-MCNC: 7.3 G/DL (ref 6.4–8.2)
PROT UR STRIP-MCNC: NEGATIVE MG/DL
Q-T INTERVAL, ECG07: 430 MS
QRS DURATION, ECG06: 96 MS
QTC CALCULATION (BEZET), ECG08: 407 MS
RBC # BLD AUTO: 3.94 M/UL (ref 4.2–5.3)
RBC #/AREA URNS HPF: NEGATIVE /HPF (ref 0–5)
SODIUM SERPL-SCNC: 136 MMOL/L (ref 136–145)
SP GR UR REFRACTOMETRY: >1.03 (ref 1–1.03)
UROBILINOGEN UR QL STRIP.AUTO: 0.2 EU/DL (ref 0.2–1)
VENTRICULAR RATE, ECG03: 54 BPM
WBC # BLD AUTO: 4.5 K/UL (ref 4.6–13.2)
WBC URNS QL MICRO: ABNORMAL /HPF (ref 0–5)

## 2022-03-17 PROCEDURE — 85025 COMPLETE CBC W/AUTO DIFF WBC: CPT

## 2022-03-17 PROCEDURE — 83690 ASSAY OF LIPASE: CPT

## 2022-03-17 PROCEDURE — 96375 TX/PRO/DX INJ NEW DRUG ADDON: CPT

## 2022-03-17 PROCEDURE — 74011250636 HC RX REV CODE- 250/636: Performed by: STUDENT IN AN ORGANIZED HEALTH CARE EDUCATION/TRAINING PROGRAM

## 2022-03-17 PROCEDURE — 99285 EMERGENCY DEPT VISIT HI MDM: CPT

## 2022-03-17 PROCEDURE — 93005 ELECTROCARDIOGRAM TRACING: CPT

## 2022-03-17 PROCEDURE — 96374 THER/PROPH/DIAG INJ IV PUSH: CPT

## 2022-03-17 PROCEDURE — 80053 COMPREHEN METABOLIC PANEL: CPT

## 2022-03-17 PROCEDURE — 74011000636 HC RX REV CODE- 636: Performed by: STUDENT IN AN ORGANIZED HEALTH CARE EDUCATION/TRAINING PROGRAM

## 2022-03-17 PROCEDURE — 81001 URINALYSIS AUTO W/SCOPE: CPT

## 2022-03-17 PROCEDURE — 74177 CT ABD & PELVIS W/CONTRAST: CPT

## 2022-03-17 RX ORDER — ONDANSETRON 4 MG/1
4 TABLET, FILM COATED ORAL
Qty: 12 TABLET | Refills: 0 | Status: SHIPPED | OUTPATIENT
Start: 2022-03-17

## 2022-03-17 RX ORDER — DIPHENHYDRAMINE HYDROCHLORIDE 50 MG/ML
25 INJECTION, SOLUTION INTRAMUSCULAR; INTRAVENOUS
Status: COMPLETED | OUTPATIENT
Start: 2022-03-17 | End: 2022-03-17

## 2022-03-17 RX ORDER — METOCLOPRAMIDE HYDROCHLORIDE 5 MG/ML
5 INJECTION INTRAMUSCULAR; INTRAVENOUS
Status: COMPLETED | OUTPATIENT
Start: 2022-03-17 | End: 2022-03-17

## 2022-03-17 RX ORDER — FENTANYL CITRATE 50 UG/ML
50 INJECTION, SOLUTION INTRAMUSCULAR; INTRAVENOUS
Status: COMPLETED | OUTPATIENT
Start: 2022-03-17 | End: 2022-03-17

## 2022-03-17 RX ORDER — ONDANSETRON 2 MG/ML
4 INJECTION INTRAMUSCULAR; INTRAVENOUS
Status: COMPLETED | OUTPATIENT
Start: 2022-03-17 | End: 2022-03-17

## 2022-03-17 RX ORDER — AMOXICILLIN AND CLAVULANATE POTASSIUM 875; 125 MG/1; MG/1
1 TABLET, FILM COATED ORAL 2 TIMES DAILY
Qty: 20 TABLET | Refills: 0 | Status: SHIPPED | OUTPATIENT
Start: 2022-03-17 | End: 2022-03-27

## 2022-03-17 RX ORDER — TRAMADOL HYDROCHLORIDE 50 MG/1
50 TABLET ORAL
Qty: 12 TABLET | Refills: 0 | Status: SHIPPED | OUTPATIENT
Start: 2022-03-17 | End: 2022-03-20

## 2022-03-17 RX ADMIN — SODIUM CHLORIDE, POTASSIUM CHLORIDE, SODIUM LACTATE AND CALCIUM CHLORIDE 1000 ML: 600; 310; 30; 20 INJECTION, SOLUTION INTRAVENOUS at 19:41

## 2022-03-17 RX ADMIN — FENTANYL CITRATE 50 MCG: 50 INJECTION, SOLUTION INTRAMUSCULAR; INTRAVENOUS at 18:34

## 2022-03-17 RX ADMIN — IOPAMIDOL 100 ML: 612 INJECTION, SOLUTION INTRAVENOUS at 18:41

## 2022-03-17 RX ADMIN — DIPHENHYDRAMINE HYDROCHLORIDE 25 MG: 50 INJECTION, SOLUTION INTRAMUSCULAR; INTRAVENOUS at 18:29

## 2022-03-17 RX ADMIN — ONDANSETRON 4 MG: 2 INJECTION INTRAMUSCULAR; INTRAVENOUS at 18:28

## 2022-03-17 RX ADMIN — METOCLOPRAMIDE HYDROCHLORIDE 5 MG: 5 INJECTION INTRAMUSCULAR; INTRAVENOUS at 18:32

## 2022-03-17 NOTE — ED PROVIDER NOTES
EMERGENCY DEPARTMENT HISTORY AND PHYSICAL EXAM      Date: 3/17/2022  Patient Name: Belia Perez    History of Presenting Illness     Chief Complaint   Patient presents with    Abdominal Pain    Nausea    Vomiting    Dizziness       History Provided By: Patient and Patient's     HPI:  Belia Perez is a 62 y.o. female with a history of migraines, sleep apnea, peptic ulcer disease, diverticulitis, CKD who presents with complaints of left lower quadrant pain, nausea, a couple episodes of vomiting, for 2 days. Also complains of migraine, history of the same, does endorse photophobia, no visual changes. No other neuro signs. She appears uncomfortable, is tender to the abdomen the left lower quadrant, not consistent with acute abdomen,    The patient denies any aggravating or alleviating factors - and has not taken any medications in an attempt to alleviate her symptoms. PMH, PSH, family history, social history, allergies reviewed with the patient with significant items noted above. She denies any chest pain, fever or diaphoresis. PCP: Elvie Rm MD    Current Outpatient Medications   Medication Sig Dispense Refill    ondansetron hcl (Zofran) 4 mg tablet Take 1 Tablet by mouth every eight (8) hours as needed for Nausea or Vomiting. 12 Tablet 0    amoxicillin-clavulanate (Augmentin) 875-125 mg per tablet Take 1 Tablet by mouth two (2) times a day for 10 days. 20 Tablet 0    traMADoL (Ultram) 50 mg tablet Take 1 Tablet by mouth every six (6) hours as needed for Pain for up to 3 days. Max Daily Amount: 200 mg. 12 Tablet 0    hydroCHLOROthiazide (HYDRODIURIL) 25 mg tablet Take 25 mg by mouth daily.  amLODIPine (NORVASC) 5 mg tablet Take 5 mg by mouth daily.  losartan (COZAAR) 100 mg tablet Take 100 mg by mouth daily.  albuterol-ipratropium (DUO-NEB) 2.5 mg-0.5 mg/3 ml nebu 3 mL by Nebulization route four (4) times daily.       meloxicam (MOBIC) 15 mg tablet Take 15 mg by mouth daily.  fluticasone propionate (FLONASE) 50 mcg/actuation nasal spray 2 Sprays by Both Nostrils route daily. 1 Bottle 0    ondansetron (Zofran ODT) 4 mg disintegrating tablet 1 Tablet by SubLINGual route every eight (8) hours as needed for Nausea or Vomiting. 20 Tablet 0    albuterol (PROVENTIL HFA, VENTOLIN HFA, PROAIR HFA) 90 mcg/actuation inhaler Take 2 Puffs by inhalation every four (4) hours as needed for Wheezing or Shortness of Breath. 1 Inhaler 1    inhalational spacing device 1 Each by Does Not Apply route as needed. Please dispense one adult spacer to be used with albuterol HFA. 1 Device 0    montelukast (SINGULAIR) 10 mg tablet Take 10 mg by mouth nightly.  acetaminophen (TYLENOL) 500 mg tablet Take 1,000 mg by mouth every six (6) hours as needed for Pain or Fever.  fluticasone-salmeterol (ADVAIR) 250-50 mcg/dose diskus inhaler Take 1 Puff by inhalation two (2) times a day. 1 Inhaler 1    escitalopram oxalate (LEXAPRO) 10 mg tablet Take 20 mg by mouth daily.  levETIRAcetam (KEPPRA) 500 mg tablet Take 750 mg by mouth two (2) times a day. 1 tab a.m. & 2 tabs p.m.      SUMAtriptan succinate 6 mg/0.5 mL kit 6 mg by SubCUTAneous route once as needed for Migraine.  OXcarbazepine (TRILEPTAL) 300 mg tablet Take 300 mg by mouth three (3) times daily. 1 tab morning. ; 1 tab @ Noon & 2 tabs @ HS         Past History     Past Medical History:  Past Medical History:   Diagnosis Date    Arthritis     Asthma     Cancer ([facility]     ovarian    Chronic kidney disease     deformed right  kidney    Diabetes ([facility]     patient denies    Dizziness     Hypertension     Migraines     PUD (peptic ulcer disease)     H/O, no meds at this time 2016    Seizures [facility])     last seizure Sept 2021    Stroke [facility]) 2006     mild weakness right hand    Unspecified sleep apnea     uses cpap       Past Surgical History:  Past Surgical History:   Procedure Laterality Date    HX APPENDECTOMY  HX COLONOSCOPY      HX GYN      hysterectomy    HX OTHER SURGICAL      mole removed from neck       Family History:  Family History   Problem Relation Age of Onset    Malignant Hyperthermia Neg Hx     Pseudocholinesterase Deficiency Neg Hx     Delayed Awakening Neg Hx     Post-op Nausea/Vomiting Neg Hx     Emergence Delirium Neg Hx     Post-op Cognitive Dysfunction Neg Hx     Other Neg Hx        Social History:  Social History     Tobacco Use    Smoking status: Never Smoker    Smokeless tobacco: Never Used   Substance Use Topics    Alcohol use: No    Drug use: Yes     Types: Marijuana     Comment: about twice weekly       Allergies:   Allergies   Allergen Reactions    Morphine Itching     Can tolerate if premed w/ benadryl    Pertussis Vaccine,Adsorbed Swelling     Swelling at injection site    Vicodin [Hydrocodone-Acetaminophen] Itching     Can tolerate if premed w/ benadryl       Review of Systems   Review of Systems    In addition to that documented in the HPI above  All other review of systems negative    Constitutional: Denies fevers or chills  Eyes: Denies vision changes  ENMT: Denies sore throat  CV: Denies chest pain  Resp: Denies SOB  GI: Denies diarrhea, no blood in stool  : Denies painful urination  MSK: Denies recent trauma  Skin: Denies new rashes  Neuro: Denies new numbness or tingling or weakness  Endocrine: Denies polyuria  Heme: Denies bleeding disorders    Physical Exam     Vitals:    03/17/22 1658   BP: (!) 162/66   Pulse: (!) 59   Resp: 16   Temp: 98.1 °F (36.7 °C)   SpO2: 100%   Weight: 82 kg (180 lb 12.4 oz)   Height: 5' 2\" (1.575 m)     Physical Exam    Nursing notes and vital signs reviewed  General: Patient is awake and alert, appears uncomfortable  Head: Normocephalic, Atraumatic  Eyes: EOMI, no conjunctival pallor  Neck: Supple, Normal external exam  Cardiovascular: RRR, no murmur auscultated, warm, well-perfused extremities  Chest: Normal work of breathing and chest excursion bilaterally  Respiratory: Patient is in no respiratory distress, lungs CTAB  Abdomen: Soft, non tender, non distended  Back: No evidence of trauma or deformity  Extremities: No evidence of trauma or deformity, no LE edema  Skin: Warm and dry, intact  Neuro: Alert and appropriate, CN intact, normal speech, strength and sensation full and symmetric bilaterally, normal gait, normal coordination  Psychiatric: Normal mood and affect    Medical Decision Making   I am the first provider for this patient. I reviewed the vital signs, available nursing notes, past medical history, past surgical history, family history and social history. Vital Signs-Reviewed the patient's vital signs. Visit Vitals  BP (!) 162/66 (BP 1 Location: Right upper arm, BP Patient Position: Sitting)   Pulse (!) 59   Temp 98.1 °F (36.7 °C)   Resp 16   Ht 5' 2\" (1.575 m)   Wt 82 kg (180 lb 12.4 oz)   SpO2 100%   BMI 33.06 kg/m²       EKG interpretation: (Preliminary)  Interpreted by the EP. EKG non diagnostic, without acute ischemic changes, arrhythmia, prolonged QT, or evidence of Brugada    Provider Notes (Medical Decision Making):   Ann Rod is a 62 y.o. female  With LLQ pain, most likely diverticulitis. Considered Pancreatitis, Cholecystitis, Cholangitis, Hepatitis, Appendicitis, Diverticulitis, Colitis, Gastritis, PUD, Ureterolithiasis, SBO, Gastroparesis, Bowel Perforation, Hernia, Mesenteric Ischemia, volvulus, AAA     Patient presents with abdominal pain. Exam significant for tenderness in the LLQ. Procedures:  Procedures    ED Course:   ED Course as of 03/18/22 1123   Thu Mar 17, 2022   1900 IV infiltrated,  [DM]   1923 CBC without leukocytosis or anemia. No electrolyte abnormality on chemistry panel. Lipase normal   [DM]   2006 CT Scan pending.  [DM]      ED Course User Index  [DM] Dasha Pacheco MD      CT scan as noted with duodenitis. Will have her follow up with her GI doc in [place].    Home with Augmentin, should also cover UTI. No acute pathology necessitating further emergent workup or hospital admission is suspected or found. Will discharge home with meds and follow up. She is comfortable with the plan and discharge at this time. Expressed the importance of follow up for current symptoms and she agrees and was advised on what signs/symptoms to return immediately to the ER. Vitals Review/addressed -     Diagnostic Study Results     Orders Placed This Encounter    CT ABD PELV W CONT     Standing Status:   Standing     Number of Occurrences:   1     Order Specific Question:   Transport     Answer:   BED [2]     Order Specific Question:   Reason for Exam     Answer:   LLQ Pain     Order Specific Question: This order utilizes IV contrast.  What additional contrast is needed? Answer:   Per Radiologist Protocol     Order Specific Question:   Does patient have history of Renal Disease?      Answer:   No     Order Specific Question:   Decision Support Exception     Answer:   Emergency Medical Condition (MA) [1]    CBC WITH AUTOMATED DIFF     Standing Status:   Standing     Number of Occurrences:   1    METABOLIC PANEL, COMPREHENSIVE     Standing Status:   Standing     Number of Occurrences:   1    LIPASE     Standing Status:   Standing     Number of Occurrences:   1    URINALYSIS W/ RFLX MICROSCOPIC     Standing Status:   Standing     Number of Occurrences:   1    URINE MICROSCOPIC ONLY     Standing Status:   Standing     Number of Occurrences:   1    EKG, 12 LEAD, INITIAL     Standing Status:   Standing     Number of Occurrences:   1     Order Specific Question:   Reason for Exam:     Answer:   Shortness of breath    fentaNYL citrate (PF) injection 50 mcg    ondansetron (ZOFRAN) injection 4 mg    metoclopramide HCl (REGLAN) injection 5 mg    diphenhydrAMINE (BENADRYL) injection 25 mg    lactated ringers bolus infusion 1,000 mL    iopamidoL (ISOVUE 300) 61 % contrast injection 100 mL    ondansetron hcl (Zofran) 4 mg tablet     Sig: Take 1 Tablet by mouth every eight (8) hours as needed for Nausea or Vomiting. Dispense:  12 Tablet     Refill:  0    amoxicillin-clavulanate (Augmentin) 875-125 mg per tablet     Sig: Take 1 Tablet by mouth two (2) times a day for 10 days. Dispense:  20 Tablet     Refill:  0    traMADoL (Ultram) 50 mg tablet     Sig: Take 1 Tablet by mouth every six (6) hours as needed for Pain for up to 3 days. Max Daily Amount: 200 mg. Dispense:  12 Tablet     Refill:  0       Labs -     No results found for this or any previous visit (from the past 12 hour(s)). Radiologic Studies -   CT ABD PELV W CONT   Final Result      Abnormal appearing duodenum and proximal jejunum with wall thickening and slight   distention concerning for infectious or inflammatory small bowel process. No   bowel obstruction. Small hiatal hernia. Advise GI consultation. CT Results  (Last 48 hours)               03/17/22 1915  CT ABD PELV W CONT Final result    Impression:      Abnormal appearing duodenum and proximal jejunum with wall thickening and slight   distention concerning for infectious or inflammatory small bowel process. No   bowel obstruction. Small hiatal hernia. Advise GI consultation. Narrative:  EXAM: CT of the abdomen and pelvis       INDICATION: Left lower quadrant pain       COMPARISON: January 14, 2016       TECHNIQUE: Axial CT imaging of the abdomen and pelvis was performed with   intravenous contrast. Multiplanar reformats were generated. One or more dose   reduction techniques were used on this CT: automated exposure control,   adjustment of the mAs and/or kVp according to patient size, and iterative   reconstruction techniques. The specific techniques used on this CT exam have   been documented in the patient's electronic medical record.  Digital Imaging and   Communications in Medicine (DICOM) format image data are available to nonaffiliated external healthcare facilities or entities on a secure, media   free, reciprocally searchable basis with patient authorization for at least a   12-month period after this study. _______________       FINDINGS:       LOWER CHEST: Unremarkable. Small hiatal hernia present. LIVER, BILIARY: Liver is normal. No biliary dilation. Gallbladder is   unremarkable. PANCREAS: Normal.       SPLEEN: Normal.       ADRENALS: Normal.       KIDNEYS: Normal.       VASCULATURE: Mild calcific atherosclerosis present. LYMPH NODES: No enlarged lymph nodes. GASTROINTESTINAL TRACT: Colonic diverticulosis present without diverticulitis. Appendix is normal. There is no bowel obstruction. Small hiatal hernia present. There is wall thickening and slight distention of   the entire duodenum and proximal jejunum which may reflect infectious or   inflammatory small bowel process. PELVIC ORGANS: Hysterectomy. The urinary bladder is unremarkable. BONES: No acute or aggressive osseous abnormalities identified. OTHER: None.       _______________               CXR Results  (Last 48 hours)    None          Disposition     Disposition:  Home    CLINICAL IMPRESSION:    1. Abdominal pain, LLQ (left lower quadrant)    2. History of diverticulitis    3. Acute cystitis without hematuria    4.  Gastritis and duodenitis        It should be noted that I will be the provider of record for this patient  Florentino Richardson MD    Follow-up Information     Follow up With Specialties Details Why [address]    THE [facility] EMERGENCY DEPT Emergency Medicine Go to  If symptoms worsen [address] Meg Rios  [phone]    Avinash Sinha MD Family Medicine Call  As needed [address] [facility] [place] [address]  [phone]            Discharge Medication List as of 3/17/2022  8:20 PM      START taking these medications    Details   ondansetron hcl (Zofran) 4 mg tablet Take 1 Tablet by mouth every eight (8) hours as needed for Nausea or Vomiting., Normal, Disp-12 Tablet, R-0      amoxicillin-clavulanate (Augmentin) 875-125 mg per tablet Take 1 Tablet by mouth two (2) times a day for 10 days. , Print, Disp-20 Tablet, R-0      traMADoL (Ultram) 50 mg tablet Take 1 Tablet by mouth every six (6) hours as needed for Pain for up to 3 days. Max Daily Amount: 200 mg., Normal, Disp-12 Tablet, R-0         CONTINUE these medications which have NOT CHANGED    Details   hydroCHLOROthiazide (HYDRODIURIL) 25 mg tablet Take 25 mg by mouth daily. , Historical Med      amLODIPine (NORVASC) 5 mg tablet Take 5 mg by mouth daily. , Historical Med      losartan (COZAAR) 100 mg tablet Take 100 mg by mouth daily. , Historical Med      albuterol-ipratropium (DUO-NEB) 2.5 mg-0.5 mg/3 ml nebu 3 mL by Nebulization route four (4) times daily. , Historical Med      meloxicam (MOBIC) 15 mg tablet Take 15 mg by mouth daily. , Historical Med      fluticasone propionate (FLONASE) 50 mcg/actuation nasal spray 2 Sprays by Both Nostrils route daily. , Normal, Disp-1 Bottle, R-0      ondansetron (Zofran ODT) 4 mg disintegrating tablet 1 Tablet by SubLINGual route every eight (8) hours as needed for Nausea or Vomiting., Normal, Disp-20 Tablet, R-0      albuterol (PROVENTIL HFA, VENTOLIN HFA, PROAIR HFA) 90 mcg/actuation inhaler Take 2 Puffs by inhalation every four (4) hours as needed for Wheezing or Shortness of Breath., Normal, Disp-1 Inhaler, R-1      inhalational spacing device 1 Each by Does Not Apply route as needed. Please dispense one adult spacer to be used with albuterol HFA., Normal, Disp-1 Device, R-0      montelukast (SINGULAIR) 10 mg tablet Take 10 mg by mouth nightly., Historical Med      acetaminophen (TYLENOL) 500 mg tablet Take 1,000 mg by mouth every six (6) hours as needed for Pain or Fever., Historical Med      fluticasone-salmeterol (ADVAIR) 250-50 mcg/dose diskus inhaler Take 1 Puff by inhalation two (2) times a day. , Print, Disp-1 Inhaler, R-1      escitalopram oxalate (LEXAPRO) 10 mg tablet Take 20 mg by mouth daily. , Historical Med      levETIRAcetam (KEPPRA) 500 mg tablet Take 750 mg by mouth two (2) times a day. 1 tab a.m. & 2 tabs p.m., Historical Med      SUMAtriptan succinate 6 mg/0.5 mL kit 6 mg by SubCUTAneous route once as needed for Migraine., Historical Med      OXcarbazepine (TRILEPTAL) 300 mg tablet Take 300 mg by mouth three (3) times daily. 1 tab morning. ; 1 tab @ Noon & 2 tabs @ HS, Historical Med             Please note that this dictation was completed with [employer], the computer voice recognition software. Quite often unanticipated grammatical, syntax, homophones, and other interpretive errors are inadvertently transcribed by the computer software. Please disregard these errors. Please excuse any errors that have escaped final proofreading.

## 2022-03-17 NOTE — ED TRIAGE NOTES
Pt arrives via ems stretcher from PCP office, pt sts she has had n/v/d & HA x 3 days, pt sts when at her PCP office she became dizzy and had near syncopal episode, pt is is obvious distress aeb guarding, facial grimacing, and restlessness, pt is able to make needs known speaking in complete sentences

## 2022-03-18 PROBLEM — R56.9 SEIZURES (HCC): Status: ACTIVE | Noted: 2020-02-14

## 2022-03-18 NOTE — DISCHARGE INSTRUCTIONS
Please carefully read all discharge instructions    Please call your GI doctor tomorrow (sheng)    Please follow-up with a primary care physician and if you do not have one currently use the contact information provided to obtain an appointment. If none was provided please call the number on the back of your insurance card to locate a Primary care doctor. Many offices have \"cancellation lists\" that you can ask to be placed on; should a patient with an earlier appointment cancel you will be notified to take their place. Please return to the Emergency Room immediately if your symptoms worsen. Please return to the Emergency Department if you develop a fever, chills, cannot eat or drink due to nausea or vomiting, or if any of your symptoms worsen. If you do not have insurance you can use the below for your medications. [email or web address]    What are GoodRx coupons? GoodRx coupons will help you pay less than the cash price for your prescription. Randy Sara free to use and are accepted at virtually every [place] pharmacy. Your pharmacist will know how to enter the codes on the coupon to pull up the lowest discount available. [employer] Activation    Thank you for requesting access to [employer]. Please follow the instructions below to securely access and download your online medical record. [employer] allows you to send messages to your doctor, view your test results, renew your prescriptions, schedule appointments, and more. How Do I Sign Up? In your internet browser, go to [email or web address]  Click on the First Time User? Click Here link in the Sign In box. You will be redirect to the New Member Sign Up page. Enter your [employer] Access Code exactly as it appears below. You will not need to use this code after youve completed the sign-up process. If you do not sign up before the expiration date, you must request a new code. [employer] Access Code:  Activation code not generated  Current [employer] Status: Active    Enter the last four digits of your Social Security Number (xxxx) and Date of Birth (mm/dd/yyyy) as indicated and click Submit. You will be taken to the next sign-up page. Create a [facility] ID. This will be your [facility] login ID and cannot be changed, so think of one that is secure and easy to remember. Create a [facility] password. You can change your password at any time. Enter your Password Reset Question and Answer. This can be used at a later time if you forget your password. Enter your e-mail address. You will receive e-mail notification when new information is available in [address] Click Sign Up. You can now view and download portions of your medical record. Click the Fetch MD link to download a portable copy of your medical information. Additional Information    If you have questions, please visit the Frequently Asked Questions section of the [facility] website at [email or web address] Remember, [facility] is NOT to be used for urgent needs. For medical emergencies, dial 911.

## 2022-03-19 PROBLEM — R09.02 HYPOXIA: Status: ACTIVE | Noted: 2017-03-22

## 2022-03-19 PROBLEM — Z78.9 FAILURE OF OUTPATIENT TREATMENT: Status: ACTIVE | Noted: 2020-02-14

## 2022-03-19 PROBLEM — M17.11 OSTEOARTHRITIS OF RIGHT KNEE: Status: ACTIVE | Noted: 2021-10-26

## 2022-03-19 PROBLEM — J45.41 MODERATE PERSISTENT ASTHMA WITH ACUTE EXACERBATION: Status: ACTIVE | Noted: 2020-02-14

## 2022-03-19 PROBLEM — J18.9 CAP (COMMUNITY ACQUIRED PNEUMONIA): Status: ACTIVE | Noted: 2020-02-14

## 2022-03-20 PROBLEM — K59.00 CONSTIPATION: Status: ACTIVE | Noted: 2017-03-22

## 2022-04-19 ENCOUNTER — HOSPITAL ENCOUNTER (EMERGENCY)
Age: 62
Discharge: HOME | End: 2022-04-19
Attending: EMERGENCY MEDICINE
Payer: COMMERCIAL

## 2022-04-19 ENCOUNTER — APPOINTMENT (OUTPATIENT)
Dept: CT IMAGING | Age: 62
End: 2022-04-19
Attending: EMERGENCY MEDICINE
Payer: COMMERCIAL

## 2022-04-19 VITALS
TEMPERATURE: 97.5 F | OXYGEN SATURATION: 100 % | HEIGHT: 62 IN | DIASTOLIC BLOOD PRESSURE: 56 MMHG | WEIGHT: 174 LBS | BODY MASS INDEX: 32.02 KG/M2 | HEART RATE: 71 BPM | SYSTOLIC BLOOD PRESSURE: 145 MMHG | RESPIRATION RATE: 16 BRPM

## 2022-04-19 DIAGNOSIS — N30.01 ACUTE CYSTITIS WITH HEMATURIA: Primary | ICD-10-CM

## 2022-04-19 LAB
ALBUMIN SERPL-MCNC: 3.8 G/DL (ref 3.4–5)
ALBUMIN/GLOB SERPL: 1.1 {RATIO} (ref 0.8–1.7)
ALP SERPL-CCNC: 120 U/L (ref 45–117)
ALT SERPL-CCNC: 12 U/L (ref 13–56)
ANION GAP SERPL CALC-SCNC: 0 MMOL/L (ref 3–18)
APPEARANCE UR: ABNORMAL
APTT PPP: 31 SEC (ref 23–36.4)
AST SERPL-CCNC: 12 U/L (ref 10–38)
BACTERIA URNS QL MICRO: ABNORMAL /HPF
BASOPHILS # BLD: 0 K/UL (ref 0–0.1)
BASOPHILS NFR BLD: 1 % (ref 0–2)
BILIRUB SERPL-MCNC: 0.3 MG/DL (ref 0.2–1)
BILIRUB UR QL: NEGATIVE
BUN SERPL-MCNC: 11 MG/DL (ref 7–18)
BUN/CREAT SERPL: 13 (ref 12–20)
CALCIUM SERPL-MCNC: 8.9 MG/DL (ref 8.5–10.1)
CHLORIDE SERPL-SCNC: 107 MMOL/L (ref 100–111)
CO2 SERPL-SCNC: 31 MMOL/L (ref 21–32)
COLOR UR: ABNORMAL
CREAT SERPL-MCNC: 0.83 MG/DL (ref 0.6–1.3)
DIFFERENTIAL METHOD BLD: ABNORMAL
EOSINOPHIL # BLD: 0.7 K/UL (ref 0–0.4)
EOSINOPHIL NFR BLD: 8 % (ref 0–5)
EPITH CASTS URNS QL MICRO: ABNORMAL /LPF (ref 0–5)
ERYTHROCYTE [DISTWIDTH] IN BLOOD BY AUTOMATED COUNT: 13.2 % (ref 11.6–14.5)
GLOBULIN SER CALC-MCNC: 3.4 G/DL (ref 2–4)
GLUCOSE SERPL-MCNC: 85 MG/DL (ref 74–99)
GLUCOSE UR STRIP.AUTO-MCNC: NEGATIVE MG/DL
HCT VFR BLD AUTO: 36.8 % (ref 35–45)
HGB BLD-MCNC: 11.7 G/DL (ref 12–16)
HGB UR QL STRIP: ABNORMAL
IMM GRANULOCYTES # BLD AUTO: 0 K/UL (ref 0–0.04)
IMM GRANULOCYTES NFR BLD AUTO: 1 % (ref 0–0.5)
INR PPP: 1 (ref 0.8–1.2)
KETONES UR QL STRIP.AUTO: NEGATIVE MG/DL
LEUKOCYTE ESTERASE UR QL STRIP.AUTO: ABNORMAL
LYMPHOCYTES # BLD: 1.4 K/UL (ref 0.9–3.6)
LYMPHOCYTES NFR BLD: 17 % (ref 21–52)
MCH RBC QN AUTO: 30.6 PG (ref 24–34)
MCHC RBC AUTO-ENTMCNC: 31.8 G/DL (ref 31–37)
MCV RBC AUTO: 96.3 FL (ref 78–100)
MONOCYTES # BLD: 0.5 K/UL (ref 0.05–1.2)
MONOCYTES NFR BLD: 6 % (ref 3–10)
NEUTS SEG # BLD: 5.7 K/UL (ref 1.8–8)
NEUTS SEG NFR BLD: 68 % (ref 40–73)
NITRITE UR QL STRIP.AUTO: NEGATIVE
NRBC # BLD: 0 K/UL (ref 0–0.01)
NRBC BLD-RTO: 0 PER 100 WBC
PH UR STRIP: 8.5 [PH] (ref 5–8)
PLATELET # BLD AUTO: 365 K/UL (ref 135–420)
PMV BLD AUTO: 9.8 FL (ref 9.2–11.8)
POTASSIUM SERPL-SCNC: 4.3 MMOL/L (ref 3.5–5.5)
PROT SERPL-MCNC: 7.2 G/DL (ref 6.4–8.2)
PROT UR STRIP-MCNC: >300 MG/DL
PROTHROMBIN TIME: 12.5 SEC (ref 11.5–15.2)
RBC # BLD AUTO: 3.82 M/UL (ref 4.2–5.3)
RBC #/AREA URNS HPF: ABNORMAL /HPF (ref 0–5)
SODIUM SERPL-SCNC: 138 MMOL/L (ref 136–145)
SP GR UR REFRACTOMETRY: 1.02 (ref 1–1.03)
UROBILINOGEN UR QL STRIP.AUTO: 0.2 EU/DL (ref 0.2–1)
WBC # BLD AUTO: 8.3 K/UL (ref 4.6–13.2)
WBC URNS QL MICRO: ABNORMAL /HPF (ref 0–5)

## 2022-04-19 PROCEDURE — 74176 CT ABD & PELVIS W/O CONTRAST: CPT

## 2022-04-19 PROCEDURE — 81001 URINALYSIS AUTO W/SCOPE: CPT

## 2022-04-19 PROCEDURE — 85025 COMPLETE CBC W/AUTO DIFF WBC: CPT

## 2022-04-19 PROCEDURE — 74011250636 HC RX REV CODE- 250/636: Performed by: EMERGENCY MEDICINE

## 2022-04-19 PROCEDURE — 85730 THROMBOPLASTIN TIME PARTIAL: CPT

## 2022-04-19 PROCEDURE — 99284 EMERGENCY DEPT VISIT MOD MDM: CPT

## 2022-04-19 PROCEDURE — 96375 TX/PRO/DX INJ NEW DRUG ADDON: CPT

## 2022-04-19 PROCEDURE — 74011250637 HC RX REV CODE- 250/637: Performed by: EMERGENCY MEDICINE

## 2022-04-19 PROCEDURE — 85610 PROTHROMBIN TIME: CPT

## 2022-04-19 PROCEDURE — 80053 COMPREHEN METABOLIC PANEL: CPT

## 2022-04-19 PROCEDURE — 96374 THER/PROPH/DIAG INJ IV PUSH: CPT

## 2022-04-19 RX ORDER — CEPHALEXIN 250 MG/1
500 CAPSULE ORAL
Status: COMPLETED | OUTPATIENT
Start: 2022-04-19 | End: 2022-04-19

## 2022-04-19 RX ORDER — CEPHALEXIN 500 MG/1
500 CAPSULE ORAL 2 TIMES DAILY
Qty: 14 CAPSULE | Refills: 0 | Status: SHIPPED | OUTPATIENT
Start: 2022-04-19 | End: 2022-04-26

## 2022-04-19 RX ORDER — DIPHENHYDRAMINE HYDROCHLORIDE 50 MG/ML
25 INJECTION, SOLUTION INTRAMUSCULAR; INTRAVENOUS ONCE
Status: COMPLETED | OUTPATIENT
Start: 2022-04-19 | End: 2022-04-19

## 2022-04-19 RX ORDER — HYDROMORPHONE HYDROCHLORIDE 1 MG/ML
0.5 INJECTION, SOLUTION INTRAMUSCULAR; INTRAVENOUS; SUBCUTANEOUS
Status: COMPLETED | OUTPATIENT
Start: 2022-04-19 | End: 2022-04-19

## 2022-04-19 RX ADMIN — CEPHALEXIN 500 MG: 250 CAPSULE ORAL at 20:08

## 2022-04-19 RX ADMIN — HYDROMORPHONE HYDROCHLORIDE 0.5 MG: 1 INJECTION, SOLUTION INTRAMUSCULAR; INTRAVENOUS; SUBCUTANEOUS at 16:28

## 2022-04-19 RX ADMIN — DIPHENHYDRAMINE HYDROCHLORIDE 25 MG: 50 INJECTION, SOLUTION INTRAMUSCULAR; INTRAVENOUS at 16:28

## 2022-04-19 NOTE — ED TRIAGE NOTES
Pt presents for vaginal bleeding since 0800 today. 2 pads since then. (+) suprapubic pain. H/o Hysterectomy/fibroids. Denies NVD, denies blood thinners.

## 2022-04-19 NOTE — ED PROVIDER NOTES
EMERGENCY DEPARTMENT HISTORY AND PHYSICAL EXAM    Date: 4/19/2022  Patient Name: Kaykay Becker    History of Presenting Illness     Chief Complaint   Patient presents with    Vaginal Bleeding       History Provided By: Patient     History Chance Alberto):   12:39 PM  Kaykay Becker is a 62 y.o. female with PMHX of hysterectomy who presents to the emergency department C/O vaginal bleeding onset this morning. Associated sxs include only bleeding. Pt denies any other sxs or complaints. Patient with vaginal bleeding that just started today. She had a remote (25 years ago) total hysterectomy. She does have a history of diverticulosis and the recent UTI. Chief Complaint: Vaginal bleeding  Onset: Today  Timing: Acute  Context: Symptoms started spontaneously, symptoms have rapidly worsened since onset  Location: Vagina  Quality: Painless  Severity: Moderate  Modifying Factors: Nothing makes it better, or worse. Associated Symptoms: denies any other associated signs or symptoms    PCP: Anna Lorenzo MD     Current Outpatient Medications   Medication Sig Dispense Refill    cephALEXin (Keflex) 500 mg capsule Take 1 Capsule by mouth two (2) times a day for 7 days. 14 Capsule 0    ondansetron hcl (Zofran) 4 mg tablet Take 1 Tablet by mouth every eight (8) hours as needed for Nausea or Vomiting. 12 Tablet 0    hydroCHLOROthiazide (HYDRODIURIL) 25 mg tablet Take 25 mg by mouth daily.  amLODIPine (NORVASC) 5 mg tablet Take 5 mg by mouth daily.  losartan (COZAAR) 100 mg tablet Take 100 mg by mouth daily.  albuterol-ipratropium (DUO-NEB) 2.5 mg-0.5 mg/3 ml nebu 3 mL by Nebulization route four (4) times daily.  meloxicam (MOBIC) 15 mg tablet Take 15 mg by mouth daily.  fluticasone propionate (FLONASE) 50 mcg/actuation nasal spray 2 Sprays by Both Nostrils route daily.  1 Bottle 0    ondansetron (Zofran ODT) 4 mg disintegrating tablet 1 Tablet by SubLINGual route every eight (8) hours as needed for Nausea or Vomiting. 20 Tablet 0    albuterol (PROVENTIL HFA, VENTOLIN HFA, PROAIR HFA) 90 mcg/actuation inhaler Take 2 Puffs by inhalation every four (4) hours as needed for Wheezing or Shortness of Breath. 1 Inhaler 1    inhalational spacing device 1 Each by Does Not Apply route as needed. Please dispense one adult spacer to be used with albuterol HFA. 1 Device 0    montelukast (SINGULAIR) 10 mg tablet Take 10 mg by mouth nightly.  acetaminophen (TYLENOL) 500 mg tablet Take 1,000 mg by mouth every six (6) hours as needed for Pain or Fever.  fluticasone-salmeterol (ADVAIR) 250-50 mcg/dose diskus inhaler Take 1 Puff by inhalation two (2) times a day. 1 Inhaler 1    escitalopram oxalate (LEXAPRO) 10 mg tablet Take 20 mg by mouth daily.  levETIRAcetam (KEPPRA) 500 mg tablet Take 750 mg by mouth two (2) times a day. 1 tab a.m. & 2 tabs p.m.      SUMAtriptan succinate 6 mg/0.5 mL kit 6 mg by SubCUTAneous route once as needed for Migraine.  OXcarbazepine (TRILEPTAL) 300 mg tablet Take 300 mg by mouth three (3) times daily. 1 tab morning. ; 1 tab @ Noon & 2 tabs @ HS         Past History         Past Medical History:  Past Medical History:   Diagnosis Date    Arthritis     Asthma     Cancer ([facility] [address]     ovarian    Chronic kidney disease     deformed right  kidney    Diabetes ([facility] [address]     patient denies    Dizziness     Hypertension     Migraines     PUD (peptic ulcer disease)     H/O, no meds at this time 2016    Seizures [facility])     last seizure Sept 2021    Stroke [facility]) 2006     mild weakness right hand    Unspecified sleep apnea     uses cpap       Past Surgical History:  Past Surgical History:   Procedure Laterality Date    HX APPENDECTOMY      HX COLONOSCOPY      HX GYN      hysterectomy    HX OTHER SURGICAL      mole removed from neck       Family History:  Family History   Problem Relation Age of Onset    Malignant Hyperthermia Neg Hx     Pseudocholinesterase Deficiency Neg Hx     Delayed Awakening Neg Hx     Post-op Nausea/Vomiting Neg Hx     Emergence Delirium Neg Hx     Post-op Cognitive Dysfunction Neg Hx     Other Neg Hx    Reviewed and non-contributory    Social History:  Social History     Tobacco Use    Smoking status: Never Smoker    Smokeless tobacco: Never Used   Substance Use Topics    Alcohol use: No    Drug use: Yes     Types: Marijuana     Comment: about twice weekly       Allergies: Allergies   Allergen Reactions    Morphine Itching     Can tolerate if premed w/ benadryl    Pertussis Vaccine,Adsorbed Swelling     Swelling at injection site    Vicodin [Hydrocodone-Acetaminophen] Itching     Can tolerate if premed w/ benadryl         Review of Systems      Review of Systems   Constitutional: Negative for chills and fever. HENT: Negative for congestion, rhinorrhea and sore throat. Eyes: Negative for pain and visual disturbance. Respiratory: Negative for cough, shortness of breath and wheezing. Cardiovascular: Negative for chest pain and palpitations. Gastrointestinal: Negative for abdominal pain, diarrhea and vomiting. Genitourinary: Positive for vaginal bleeding. Negative for dysuria, flank pain, frequency and urgency. Musculoskeletal: Negative for arthralgias and myalgias. Skin: Negative for rash and wound. Neurological: Negative for speech difficulty, weakness, light-headedness and headaches. Psychiatric/Behavioral: Negative for agitation and confusion. All other systems reviewed and are negative. Physical Exam     Vitals:    04/19/22 1217 04/19/22 1242 04/19/22 1558   BP: (!) 140/83  (!) 147/70   Pulse: 74  68   Resp: 14  16   Temp: 97.5 °F (36.4 °C)     SpO2: 100% 98% 100%   Weight: 78.9 kg (174 lb)     Height: 5' 2\" (1.575 m)         Physical Exam  Vitals and nursing note reviewed. Constitutional:       General: She is not in acute distress. Appearance: Normal appearance. She is normal weight. She is not ill-appearing.    HENT: Head: Normocephalic and atraumatic. Nose: Nose normal. No rhinorrhea. Mouth/Throat:      Mouth: Mucous membranes are moist.      Pharynx: No oropharyngeal exudate or posterior oropharyngeal erythema. Eyes:      Extraocular Movements: Extraocular movements intact. Conjunctiva/sclera: Conjunctivae normal.      Pupils: Pupils are equal, round, and reactive to light. Cardiovascular:      Rate and Rhythm: Normal rate and regular rhythm. Heart sounds: No murmur heard. No friction rub. No gallop. Pulmonary:      Effort: Pulmonary effort is normal. No respiratory distress. Breath sounds: Normal breath sounds. No wheezing, rhonchi or rales. Abdominal:      General: Bowel sounds are normal.      Palpations: Abdomen is soft. Tenderness: There is no abdominal tenderness. There is no guarding or rebound. Musculoskeletal:         General: No swelling, tenderness or deformity. Normal range of motion. Cervical back: Normal range of motion and neck supple. No rigidity. Lymphadenopathy:      Cervical: No cervical adenopathy. Skin:     General: Skin is warm and dry. Findings: No rash. Neurological:      General: No focal deficit present. Mental Status: She is alert and oriented to person, place, and time.    Psychiatric:         Mood and Affect: Mood normal.         Behavior: Behavior normal.         Diagnostic Study Results     Labs -     Recent Results (from the past 12 hour(s))   URINALYSIS W/ RFLX MICROSCOPIC    Collection Time: 04/19/22 12:30 PM   Result Value Ref Range    Color RED      Appearance BLOODY      Specific gravity 1.025 1.003 - 1.030      pH (UA) 8.5 (H) 5.0 - 8.0      Protein >300 (A) NEG mg/dL    Glucose Negative NEG mg/dL    Ketone Negative NEG mg/dL    Bilirubin Negative NEG      Blood LARGE (A) NEG      Urobilinogen 0.2 0.2 - 1.0 EU/dL    Nitrites Negative NEG      Leukocyte Esterase SMALL (A) NEG     URINE MICROSCOPIC ONLY    Collection Time: 04/19/22 12:30 PM   Result Value Ref Range    WBC TOO NUMEROUS TO COUNT 0 - 5 /hpf    RBC TOO NUMEROUS TO COUNT 0 - 5 /hpf    Epithelial cells  0 - 5 /lpf     Macroscopic performed on spun urine due to gross blood  or mucus    Bacteria FEW (A) NEG /hpf   CBC WITH AUTOMATED DIFF    Collection Time: 04/19/22  1:45 PM   Result Value Ref Range    WBC 8.3 4.6 - 13.2 K/uL    RBC 3.82 (L) 4.20 - 5.30 M/uL    HGB 11.7 (L) 12.0 - 16.0 g/dL    HCT 36.8 35.0 - 45.0 %    MCV 96.3 78.0 - 100.0 FL    MCH 30.6 24.0 - 34.0 PG    MCHC 31.8 31.0 - 37.0 g/dL    RDW 13.2 11.6 - 14.5 %    PLATELET 108 706 - 859 K/uL    MPV 9.8 9.2 - 11.8 FL    NRBC 0.0 0  WBC    ABSOLUTE NRBC 0.00 0.00 - 0.01 K/uL    NEUTROPHILS 68 40 - 73 %    LYMPHOCYTES 17 (L) 21 - 52 %    MONOCYTES 6 3 - 10 %    EOSINOPHILS 8 (H) 0 - 5 %    BASOPHILS 1 0 - 2 %    IMMATURE GRANULOCYTES 1 (H) 0.0 - 0.5 %    ABS. NEUTROPHILS 5.7 1.8 - 8.0 K/UL    ABS. LYMPHOCYTES 1.4 0.9 - 3.6 K/UL    ABS. MONOCYTES 0.5 0.05 - 1.2 K/UL    ABS. EOSINOPHILS 0.7 (H) 0.0 - 0.4 K/UL    ABS. BASOPHILS 0.0 0.0 - 0.1 K/UL    ABS. IMM. GRANS. 0.0 0.00 - 0.04 K/UL    DF AUTOMATED     METABOLIC PANEL, COMPREHENSIVE    Collection Time: 04/19/22  1:45 PM   Result Value Ref Range    Sodium 138 136 - 145 mmol/L    Potassium 4.3 3.5 - 5.5 mmol/L    Chloride 107 100 - 111 mmol/L    CO2 31 21 - 32 mmol/L    Anion gap 0 (L) 3.0 - 18 mmol/L    Glucose 85 74 - 99 mg/dL    BUN 11 7.0 - 18 MG/DL    Creatinine 0.83 0.6 - 1.3 MG/DL    BUN/Creatinine ratio 13 12 - 20      GFR est AA >60 >60 ml/min/1.73m2    GFR est non-AA >60 >60 ml/min/1.73m2    Calcium 8.9 8.5 - 10.1 MG/DL    Bilirubin, total 0.3 0.2 - 1.0 MG/DL    ALT (SGPT) 12 (L) 13 - 56 U/L    AST (SGOT) 12 10 - 38 U/L    Alk.  phosphatase 120 (H) 45 - 117 U/L    Protein, total 7.2 6.4 - 8.2 g/dL    Albumin 3.8 3.4 - 5.0 g/dL    Globulin 3.4 2.0 - 4.0 g/dL    A-G Ratio 1.1 0.8 - 1.7     PTT    Collection Time: 04/19/22  1:45 PM   Result Value Ref Range    aPTT 31.0 23.0 - 36.4 SEC   PROTHROMBIN TIME + INR    Collection Time: 04/19/22  1:45 PM   Result Value Ref Range    Prothrombin time 12.5 11.5 - 15.2 sec    INR 1.0 0.8 - 1.2         Radiologic Studies -   CT ABD PELV WO CONT   Final Result      1. Mild bladder wall thickening, possibly cystitis. 2. No evidence of obstructive uropathy. CT Results  (Last 48 hours)    None        CXR Results  (Last 48 hours)    None          Medications given in the ED-  Medications   diphenhydrAMINE (BENADRYL) injection 25 mg (25 mg IntraVENous Given 4/19/22 1628)   HYDROmorphone (DILAUDID) injection 0.5 mg (0.5 mg IntraVENous Given 4/19/22 1628)         Procedures     Pelvic Exam    Date/Time: 4/19/2022 3:33 PM  Performed by: attending  Exam assisted by:  Michael Owen RN. Type of exam performed: speculum. External genitalia appearance: normal.    Vaginal exam:  normal.    Cervix assessment: surgically absent. Specimen(s) collected:  none. ED Course     I am the first provider for this patient. I reviewed the vital signs, available nursing notes, past medical history, past surgical history, family history and social history. Records Reviewed: Nursing Notes    Pulse Oximetry Analysis - 98% on RA     Cardiac Monitor:  Rate: 74 bpm  Rhythm: sinus rhythm    12:39 PM Initial assessment performed. The patients presenting problems have been discussed, and they are in agreement with the care plan formulated and outlined with them. I have encouraged them to ask questions as they arise throughout their visit. SIGN OUT:  4:39 PM  Patient's presentation, labs/imaging and plan of care was reviewed with Dr. Taylor Strickland  as part of sign out. They will follow up with CT Scan (abdomen/pelvis) as part of the plan discussed with the patient. Dr. Joni Cuevas assistance in completion of this plan is greatly appreciated but it should be noted that I will be the provider of record for this patient.   Written by Renae Monet MD.    Medical Decision Making     Provider Notes (Medical Decision Making):   DDX: Vaginal bleeding, dysmenorrhea, enterovaginal fistula, local trauma, local erosion of the vaginal wall, hematuria    Discussion:  62 y.o. female with complaints of vaginal bleeding. Patient had no vaginal bleeding on exam.  She had hematuria. Patient is pending a CT of the abdomen pelvis without contrast to assess for stone. She has signs of UTI with hematuria on laboratory evaluation. No signs of obstructive uropathy seen on CT. Patient was able to be discharged home. To follow-up with her primary care doctor and urologist.  We will treat her UTI in the meantime. Patient understands agrees this plan. Diagnosis and Disposition     DISCHARGE NOTE:  8:30 PM  Janelle Mack  results have been reviewed with her. She has been counseled regarding her diagnosis, treatment, and plan. She verbally conveys understanding and agreement of the signs, symptoms, diagnosis, treatment and prognosis and additionally agrees to follow up as discussed. She also agrees with the care-plan and conveys that all of her questions have been answered. I have also provided discharge instructions for her that include: educational information regarding their diagnosis and treatment, and list of reasons why they would want to return to the ED prior to their follow-up appointment, should her condition change. She has been provided with education for proper emergency department utilization. CLINICAL IMPRESSION:    1. Acute cystitis with hematuria        PLAN:  1. D/C Home  2. Current Discharge Medication List      START taking these medications    Details   cephALEXin (Keflex) 500 mg capsule Take 1 Capsule by mouth two (2) times a day for 7 days. Qty: 14 Capsule, Refills: 0  Start date: 4/19/2022, End date: 4/26/2022           3.    Follow-up Information     Follow up With Specialties Details Why [address] [place], Keaton Rosenberg MD Family Medicine  As soon as possible, For follow up from Emergency Department visit. [address] [facility]  [phone]      Patti Sin MD Urology Schedule an appointment as soon as possible for a visit  As soon as possible, For follow up from Emergency Department visit. [facility]  [phone]      [facility] EMERGENCY DEPT Emergency Medicine  As needed; If symptoms worsen [address] [place] [id number]  [address]     Please note that this dictation was completed with [employer], the computer voice recognition software. Quite often unanticipated grammatical, syntax, homophones, and other interpretive errors are inadvertently transcribed by the computer software. Please disregard these errors. Please excuse any errors that have escaped final proofreading.     Lennox Comment, MD

## 2022-05-25 ENCOUNTER — TRANSCRIBE ORDER (OUTPATIENT)
Dept: SCHEDULING | Age: 62
End: 2022-05-25

## 2022-05-25 DIAGNOSIS — R10.84 ABDOMINAL PAIN, GENERALIZED: ICD-10-CM

## 2022-05-25 DIAGNOSIS — K57.92 DIVERTICULITIS: ICD-10-CM

## 2022-05-25 DIAGNOSIS — K59.00 CONSTIPATION: Primary | ICD-10-CM

## 2022-06-21 ENCOUNTER — HOSPITAL ENCOUNTER (OUTPATIENT)
Dept: CT IMAGING | Age: 62
Discharge: HOME | End: 2022-06-21
Attending: INTERNAL MEDICINE

## 2022-06-21 DIAGNOSIS — K57.92 DIVERTICULITIS: ICD-10-CM

## 2022-06-21 DIAGNOSIS — R10.84 ABDOMINAL PAIN, GENERALIZED: ICD-10-CM

## 2022-06-21 DIAGNOSIS — K59.00 CONSTIPATION: ICD-10-CM

## 2022-06-22 ENCOUNTER — HOSPITAL ENCOUNTER (OUTPATIENT)
Dept: CT IMAGING | Age: 62
Discharge: HOME | End: 2022-06-22
Attending: INTERNAL MEDICINE
Payer: COMMERCIAL

## 2022-06-22 LAB — CREAT UR-MCNC: 1 MG/DL (ref 0.6–1.3)

## 2022-06-22 PROCEDURE — 74177 CT ABD & PELVIS W/CONTRAST: CPT

## 2022-06-22 PROCEDURE — 74011000250 HC RX REV CODE- 250: Performed by: RADIOLOGY

## 2022-06-22 PROCEDURE — 74011000636 HC RX REV CODE- 636: Performed by: RADIOLOGY

## 2022-06-22 PROCEDURE — 82565 ASSAY OF CREATININE: CPT

## 2022-06-22 RX ORDER — BARIUM SULFATE 20 MG/ML
900 SUSPENSION ORAL
Status: COMPLETED | OUTPATIENT
Start: 2022-06-22 | End: 2022-06-22

## 2022-06-22 RX ADMIN — IOPAMIDOL 100 ML: 612 INJECTION, SOLUTION INTRAVENOUS at 11:22

## 2022-06-22 RX ADMIN — BARIUM SULFATE 900 ML: 20 SUSPENSION ORAL at 11:19

## 2022-09-20 NOTE — ROUTINE PROCESS
2621 - Bedside report received from [address] [place] [address]. Patient bed at this time. Pain 0/10. Patient resting quietly, eyes closed, chest rising up and down, patient resting after a fitful night of coughing. - - - - - -

## 2022-11-15 ENCOUNTER — HOSPITAL ENCOUNTER (OUTPATIENT)
Dept: LAB | Age: 62
Discharge: HOME | End: 2022-11-15
Payer: COMMERCIAL

## 2022-11-15 LAB — SODIUM SERPL-SCNC: 141 MMOL/L (ref 136–145)

## 2022-11-15 PROCEDURE — 80177 DRUG SCRN QUAN LEVETIRACETAM: CPT

## 2022-11-15 PROCEDURE — 84295 ASSAY OF SERUM SODIUM: CPT

## 2022-11-15 PROCEDURE — 80183 DRUG SCRN QUANT OXCARBAZEPIN: CPT

## 2022-11-15 PROCEDURE — 36415 COLL VENOUS BLD VENIPUNCTURE: CPT

## 2022-11-17 LAB
LEVETIRACETAM SERPL-MCNC: 23.9 UG/ML (ref 10–40)
OXCARBAZEPINE SERPL-MCNC: 25 UG/ML (ref 10–35)

## 2022-11-21 LAB
FAX TO INFO,FAXT: NORMAL
FAX TO NUMBER,FAXN: NORMAL

## 2023-03-05 ENCOUNTER — HOSPITAL ENCOUNTER (EMERGENCY)
Facility: HOSPITAL | Age: 63
Discharge: HOME | End: 2023-03-08
Payer: MEDICARE

## 2023-03-05 ENCOUNTER — HOSPITAL ENCOUNTER (EMERGENCY)
Facility: HOSPITAL | Age: 63
Discharge: HOME | End: 2023-03-06
Attending: EMERGENCY MEDICINE
Payer: MEDICARE

## 2023-03-05 DIAGNOSIS — M47.812 CERVICAL SPINE ARTHRITIS: ICD-10-CM

## 2023-03-05 DIAGNOSIS — R56.9 SEIZURE (HCC): ICD-10-CM

## 2023-03-05 DIAGNOSIS — S09.90XA MINOR HEAD INJURY, INITIAL ENCOUNTER: Primary | ICD-10-CM

## 2023-03-05 LAB
ALBUMIN SERPL-MCNC: 3.9 G/DL (ref 3.4–5)
ALBUMIN/GLOB SERPL: 1.3 (ref 0.8–1.7)
ALP SERPL-CCNC: 102 U/L (ref 45–117)
ALT SERPL-CCNC: 19 U/L (ref 13–56)
ANION GAP SERPL CALC-SCNC: 6 MMOL/L (ref 3–18)
AST SERPL-CCNC: 17 U/L (ref 10–38)
BASOPHILS # BLD: 0 K/UL (ref 0–0.1)
BASOPHILS NFR BLD: 1 % (ref 0–2)
BILIRUB SERPL-MCNC: 0.2 MG/DL (ref 0.2–1)
BUN SERPL-MCNC: 18 MG/DL (ref 7–18)
BUN/CREAT SERPL: 11 (ref 12–20)
CALCIUM SERPL-MCNC: 9.1 MG/DL (ref 8.5–10.1)
CHLORIDE SERPL-SCNC: 102 MMOL/L (ref 100–111)
CO2 SERPL-SCNC: 29 MMOL/L (ref 21–32)
CREAT SERPL-MCNC: 1.65 MG/DL (ref 0.6–1.3)
DIFFERENTIAL METHOD BLD: ABNORMAL
EKG ATRIAL RATE: 68 BPM
EKG DIAGNOSIS: NORMAL
EKG P AXIS: 44 DEGREES
EKG P-R INTERVAL: 160 MS
EKG Q-T INTERVAL: 368 MS
EKG QRS DURATION: 82 MS
EKG QTC CALCULATION (BAZETT): 391 MS
EKG R AXIS: 12 DEGREES
EKG T AXIS: 50 DEGREES
EKG VENTRICULAR RATE: 68 BPM
EOSINOPHIL # BLD: 0.2 K/UL (ref 0–0.4)
EOSINOPHIL NFR BLD: 3 % (ref 0–5)
ERYTHROCYTE [DISTWIDTH] IN BLOOD BY AUTOMATED COUNT: 12.2 % (ref 11.6–14.5)
GLOBULIN SER CALC-MCNC: 2.9 G/DL (ref 2–4)
GLUCOSE SERPL-MCNC: 117 MG/DL (ref 74–99)
HCT VFR BLD AUTO: 37 % (ref 35–45)
HGB BLD-MCNC: 12.2 G/DL (ref 12–16)
IMM GRANULOCYTES # BLD AUTO: 0 K/UL (ref 0–0.04)
IMM GRANULOCYTES NFR BLD AUTO: 0 % (ref 0–0.5)
LACTATE SERPL-SCNC: 0.9 MMOL/L (ref 0.4–2)
LACTATE SERPL-SCNC: 2.1 MMOL/L (ref 0.4–2)
LYMPHOCYTES # BLD: 1.6 K/UL (ref 0.9–3.6)
LYMPHOCYTES NFR BLD: 27 % (ref 21–52)
MCH RBC QN AUTO: 31.4 PG (ref 24–34)
MCHC RBC AUTO-ENTMCNC: 33 G/DL (ref 31–37)
MCV RBC AUTO: 95.1 FL (ref 78–100)
MONOCYTES # BLD: 0.4 K/UL (ref 0.05–1.2)
MONOCYTES NFR BLD: 7 % (ref 3–10)
NEUTS SEG # BLD: 3.7 K/UL (ref 1.8–8)
NEUTS SEG NFR BLD: 62 % (ref 40–73)
NRBC # BLD: 0 K/UL (ref 0–0.01)
NRBC BLD-RTO: 0 PER 100 WBC
PLATELET # BLD AUTO: 360 K/UL (ref 135–420)
PMV BLD AUTO: 9.8 FL (ref 9.2–11.8)
POTASSIUM SERPL-SCNC: 3.6 MMOL/L (ref 3.5–5.5)
PROT SERPL-MCNC: 6.8 G/DL (ref 6.4–8.2)
RBC # BLD AUTO: 3.89 M/UL (ref 4.2–5.3)
SODIUM SERPL-SCNC: 137 MMOL/L (ref 136–145)
WBC # BLD AUTO: 5.9 K/UL (ref 4.6–13.2)

## 2023-03-05 PROCEDURE — 83605 ASSAY OF LACTIC ACID: CPT

## 2023-03-05 PROCEDURE — 72125 CT NECK SPINE W/O DYE: CPT

## 2023-03-05 PROCEDURE — 6360000002 HC RX W HCPCS: Performed by: EMERGENCY MEDICINE

## 2023-03-05 PROCEDURE — 93005 ELECTROCARDIOGRAM TRACING: CPT | Performed by: EMERGENCY MEDICINE

## 2023-03-05 PROCEDURE — 85025 COMPLETE CBC W/AUTO DIFF WBC: CPT

## 2023-03-05 PROCEDURE — 80053 COMPREHEN METABOLIC PANEL: CPT

## 2023-03-05 PROCEDURE — 2580000003 HC RX 258: Performed by: EMERGENCY MEDICINE

## 2023-03-05 PROCEDURE — 96361 HYDRATE IV INFUSION ADD-ON: CPT

## 2023-03-05 PROCEDURE — 96374 THER/PROPH/DIAG INJ IV PUSH: CPT

## 2023-03-05 PROCEDURE — 70450 CT HEAD/BRAIN W/O DYE: CPT

## 2023-03-05 PROCEDURE — 80177 DRUG SCRN QUAN LEVETIRACETAM: CPT

## 2023-03-05 PROCEDURE — 99284 EMERGENCY DEPT VISIT MOD MDM: CPT

## 2023-03-05 RX ORDER — 0.9 % SODIUM CHLORIDE 0.9 %
1000 INTRAVENOUS SOLUTION INTRAVENOUS ONCE
Status: COMPLETED | OUTPATIENT
Start: 2023-03-05 | End: 2023-03-05

## 2023-03-05 RX ORDER — HYDROMORPHONE HYDROCHLORIDE 1 MG/ML
1 INJECTION, SOLUTION INTRAMUSCULAR; INTRAVENOUS; SUBCUTANEOUS ONCE
Status: COMPLETED | OUTPATIENT
Start: 2023-03-05 | End: 2023-03-05

## 2023-03-05 RX ADMIN — HYDROMORPHONE HYDROCHLORIDE 1 MG: 1 INJECTION, SOLUTION INTRAMUSCULAR; INTRAVENOUS; SUBCUTANEOUS at 21:02

## 2023-03-05 RX ADMIN — SODIUM CHLORIDE 1000 ML: 900 INJECTION, SOLUTION INTRAVENOUS at 20:15

## 2023-03-06 VITALS
SYSTOLIC BLOOD PRESSURE: 111 MMHG | HEART RATE: 75 BPM | OXYGEN SATURATION: 95 % | TEMPERATURE: 98 F | DIASTOLIC BLOOD PRESSURE: 45 MMHG | RESPIRATION RATE: 20 BRPM

## 2023-03-06 PROCEDURE — 6370000000 HC RX 637 (ALT 250 FOR IP): Performed by: EMERGENCY MEDICINE

## 2023-03-06 RX ORDER — METHOCARBAMOL 500 MG/1
1000 TABLET, FILM COATED ORAL
Status: COMPLETED | OUTPATIENT
Start: 2023-03-06 | End: 2023-03-06

## 2023-03-06 RX ORDER — METHOCARBAMOL 500 MG/1
500 TABLET, FILM COATED ORAL 4 TIMES DAILY
Qty: 40 TABLET | Refills: 0 | Status: SHIPPED | OUTPATIENT
Start: 2023-03-06 | End: 2023-03-16

## 2023-03-06 RX ADMIN — METHOCARBAMOL 1000 MG: 500 TABLET ORAL at 00:51

## 2023-03-06 NOTE — ED TRIAGE NOTES
Patient arrived to the ED via EMS from home after having a seizure for about 3 min. Medics state the patient hit the back on her head on the door and slid down. Patient has a hx of seizures. Patient still post tictal upon arrival. Robin Tse continue to monitor.

## 2023-03-06 NOTE — ED PROVIDER NOTES
8:06 PM  Assumed care of patient after transfer of shift change. Patient is now stable but sleepy probably postictal at this point. Scanning of head and C-spine still pending. Call from lab indicates subtle elevation in lactic acid. Will hydrate treat for correction. Follow other lab work and cognitive status for final disposition.     MD Felipe Abebe MD  03/05/23 [id number]

## 2023-03-06 NOTE — ED PROVIDER NOTES
[facility] EMERGENCY DEPT  EMERGENCY DEPARTMENT ENCOUNTER    Patient Name: Otto Alvarez  MRN: [id number]  YOB: 1960  Provider: Parul Carvalho MD  PCP: Connie Zendejas MD   Time/Date of evaluation: 7:28 PM EST on 3/5/23    History of Presenting Illness     No chief complaint on file. History Provided by: Patient   History is limited by: Mental Status Change    HISTORY (Narative):   Otto Alvarez is a 65 y.o. female past medical history of seizure disorder. History was very limited due to her mental status and postictal state. The majority of the history was obtained by EMS. Family is reportedly on their way to the hospital. She is complaining of midline neck pain as well as a headache. She does endorse being on Keppra but is unsure if she is on any other seizure medications. Per EMS she was standing when she had a witnessed seizure and fell backwards hitting her head on a door. The seizure lasted less than 3 minutes and stopped spontaneously. Who presents to the emergency department       Nursing Notes were all reviewed and agreed with or any disagreements were addressed in the HPI.     Past History     PAST MEDICAL HISTORY:  Past Medical History:   Diagnosis Date    Arthritis     Asthma     Cancer ([facility] [address]     ovarian    Chronic kidney disease     deformed right  kidney    Diabetes ([facility] [address]     patient denies    Dizziness     Hypertension     Migraines     PUD (peptic ulcer disease)     H/O, no meds at this time 2016    Seizures [facility])     last seizure Sept 2021    Stroke [facility]) 2006     mild weakness right hand    Unspecified sleep apnea     uses cpap       PAST SURGICAL HISTORY:  Past Surgical History:   Procedure Laterality Date    APPENDECTOMY      COLONOSCOPY      GYN      hysterectomy    OTHER SURGICAL HISTORY      mole removed from neck       FAMILY HISTORY:  Family History   Problem Relation Age of Onset    Post-op Nausea/Vomiting Neg Hx     Delayed Awakening Neg Hx     Emergence Delirium Neg Hx     Pseudochol. Deficiency Neg Hx     Malig Hypertherm Neg Hx     Post-op Cognitive Dysfunction Neg Hx     Other Neg Hx        SOCIAL HISTORY:  Social History     Tobacco Use    Smoking status: Never    Smokeless tobacco: Never   Substance Use Topics    Alcohol use: No    Drug use: Yes     Types: Marijuana [facility])       MEDICATIONS:  No current facility-administered medications for this encounter.      Current Outpatient Medications   Medication Sig Dispense Refill    methocarbamol (ROBAXIN) 500 MG tablet Take 1 tablet by mouth 4 times daily for 10 days 40 tablet 0    acetaminophen (TYLENOL) 500 MG tablet Take 1,000 mg by mouth every 6 hours as needed      albuterol sulfate HFA (PROVENTIL;VENTOLIN;PROAIR) 108 (90 Base) MCG/ACT inhaler Inhale 2 puffs into the lungs every 4 hours as needed      amLODIPine (NORVASC) 5 MG tablet Take 5 mg by mouth daily      escitalopram (LEXAPRO) 10 MG tablet Take 20 mg by mouth daily      fluticasone (FLONASE) 50 MCG/ACT nasal spray 2 sprays by Nasal route daily      fluticasone-salmeterol (ADVAIR DISKUS) 250-50 MCG/ACT AEPB diskus inhaler Inhale 1 puff into the lungs 2 times daily      hydroCHLOROthiazide (HYDRODIURIL) 25 MG tablet Take 25 mg by mouth daily      ipratropium-albuterol (DUONEB) 0.5-2.5 (3) MG/3ML SOLN nebulizer solution Inhale 3 mLs into the lungs 4 times daily      levETIRAcetam (KEPPRA) 500 MG tablet Take 750 mg by mouth 2 times daily      losartan (COZAAR) 100 MG tablet Take 100 mg by mouth daily      meloxicam (MOBIC) 15 MG tablet Take 15 mg by mouth daily      montelukast (SINGULAIR) 10 MG tablet Take 10 mg by mouth      ondansetron (ZOFRAN-ODT) 4 MG disintegrating tablet Place 4 mg under the tongue every 8 hours as needed      ondansetron (ZOFRAN) 4 MG tablet Take 4 mg by mouth every 8 hours as needed      OXcarbazepine (TRILEPTAL) 300 MG tablet Take 300 mg by mouth 3 times daily      SUMAtriptan Succinate Refill 6 MG/0.5ML SOCT Inject 6 mg into the skin once as needed         ALLERGIES:  Allergies   Allergen Reactions    Hydrocodone-Acetaminophen Itching     Can tolerate if premed w/ benadryl    Morphine Itching     Can tolerate if premed w/ benadryl    Pertussis Vaccine Acellular Swelling     Swelling at injection site       SOCIAL DETERMINANTS OF HEALTH:  Social Determinants of Health     Tobacco Use: Low Risk     Smoking Tobacco Use: Never    Smokeless Tobacco Use: Never    Passive Exposure: Not on file   Alcohol Use: Not on file   Financial Resource Strain: Not on file   Food Insecurity: Not on file   Transportation Needs: Not on file   Physical Activity: Not on file   Stress: Not on file   Social Connections: Not on file   Intimate Partner Violence: Not on file   Depression: Not on file   Housing Stability: Not on file       Review of Systems     Negative except as listed above in HPI. Physical Exam     Vitals:    03/05/23 2345 03/06/23 0000 03/06/23 0015 03/06/23 0030   BP: (!) 114/42 (!) 115/47 (!) 113/41 (!) 111/45   Pulse: 84 74 77 75   Resp: 22 19 19 20   Temp:       SpO2: 94% 97% 96% 95%     Constitutional: Well nourished, well developed, appears stated age. Alert and oriented. HEENT: Neck supple without meningismus. PERRLA, no conjunctival injection. EOM intact  Cardiovascular: RRR, Warm, well-perfused extremities  Respiratory: CTAB, Unlabored respiratory effort  Abdominal: Soft, nontender, nondistended, no CVA tenderness  Musculoskeletal: Full range of motion all extremities. No deformities. No peripheral edema. Skin: Warm, dry. No rashes  Vascular: Pulses equal in all 4 extremities. Neuro: CNs II-XII grossly intact. Sensation and motor function of extremities grossly intact. Psych: Appropriate mood and affect. Vital signs were stable within normal limits on arrival.      Diagnostic Study Results     LABS:  No results found for this or any previous visit (from the past 12 hour(s)).     RADIOLOGIC STUDIES:   Non x-ray images such as CT, Ultrasound and MRI are read by the radiologist. X-ray images are visualized and preliminarily interpreted by the ED Provider with the findings as listed in the ED Course section below. Interpretation per the Radiologist is listed below, if available at the time of this note:    CT HEAD WO CONTRAST   Final Result   No evidence for acute intracranial trauma   No evidence for acute cervical trauma   Multifactorial central canal stenosis C5-6 and C6-7   Moderate left C5-6 neural foraminal stenosis   Multinodular goiter with be better evaluated by ultrasound      CT CERVICAL SPINE WO CONTRAST   Final Result   No evidence for acute intracranial trauma   No evidence for acute cervical trauma   Multifactorial central canal stenosis C5-6 and C6-7   Moderate left C5-6 neural foraminal stenosis   Multinodular goiter with be better evaluated by ultrasound          Procedures     Procedures    ED Course     7:28 PM SANTHOSH Pollock MD) am the first provider for this patient. Initial assessment performed. I reviewed the vital signs, available nursing notes, past medical history, past surgical history, family history and social history. The patients presenting problems have been discussed, and they are in agreement with the care plan formulated and outlined with them. I have encouraged them to ask questions as they arise throughout their visit. Point-of-care glucose was within normal limits. Neurologic exam was somewhat limited due to her mental status but she appeared to have no focal neurologic deficits. Due to her head trauma neck pain during the seizure CT head and C-spine were ordered. RECORDS REVIEWED: Nursing Notes    Is this patient to be included in the SEP-1 core measure due to severe sepsis or septic shock? No Exclusion criteria - the patient is NOT to be included for SEP-1 Core Measure due to:  Infection is not suspected    MEDICATIONS ADMINISTERED IN THE ED:  Medications   0.9 % sodium chloride bolus (0 mLs IntraVENous Stopped 3/5/23 2240)   HYDROmorphone HCl PF (DILAUDID) injection 1 mg (1 mg IntraVENous Given 3/5/23 2102)   methocarbamol (ROBAXIN) tablet 1,000 mg (1,000 mg Oral Given 3/6/23 0051)            None    Medical Decision Making     SCREENING TOOLS:  None    DDX: Seizure, intoxication, traumatic brain injury    DISCUSSION:  65 y.o. female presenting after witnessed seizure. The patient was signed out to the oncoming physician pending neurologic reevaluation, labs and imaging. ADDITIONAL CONSIDERATIONS:  None    Critical Care Time:     None    Nathan Rosenbaum MD    Diagnosis and Disposition           Diagnosis: Seizure   Disposition: Pending, signed out to oncoming physician    Jhoan Brown MD am the primary clinician of record. Dragon Disclaimer     Please note that this dictation was completed with [employer], the computer voice recognition software. Quite often unanticipated grammatical, syntax, homophones, and other interpretive errors are inadvertently transcribed by the computer software. Please disregard these errors. Please excuse any errors that have escaped final proofreading.     Nathan Rosenbaum MD  (Electronically signed)             Parul Carvalho MD  03/06/23 2100

## 2023-03-08 LAB — LEVETIRACETAM SERPL-MCNC: 24.5 UG/ML (ref 10–40)

## 2023-09-07 ENCOUNTER — HOSPITAL ENCOUNTER (OUTPATIENT)
Facility: HOSPITAL | Age: 63
Discharge: HOME | End: 2023-09-07
Payer: MEDICARE

## 2023-09-07 DIAGNOSIS — G40.209 COMPLEX PARTIAL SEIZURES WITH CONSCIOUSNESS IMPAIRED (HCC): ICD-10-CM

## 2023-09-07 PROCEDURE — 36415 COLL VENOUS BLD VENIPUNCTURE: CPT

## 2023-09-07 PROCEDURE — 80183 DRUG SCRN QUANT OXCARBAZEPIN: CPT

## 2023-09-07 PROCEDURE — 80177 DRUG SCRN QUAN LEVETIRACETAM: CPT

## 2023-09-09 LAB — LEVETIRACETAM SERPL-MCNC: 26.5 UG/ML (ref 10–40)

## 2023-09-11 LAB
FAX TO NUMBER: NORMAL
OXCARBAZEPINE SERPL-MCNC: 28 UG/ML (ref 10–35)
TEST RESULTS FAXED TO: NORMAL

## 2024-04-10 ENCOUNTER — HOSPITAL ENCOUNTER (OUTPATIENT)
Facility: HOSPITAL | Age: 64
Discharge: HOME | End: 2024-04-13
Payer: MEDICARE

## 2024-04-10 PROCEDURE — 36415 COLL VENOUS BLD VENIPUNCTURE: CPT

## 2024-04-10 PROCEDURE — 80235 DRUG ASSAY LACOSAMIDE: CPT

## 2024-04-10 PROCEDURE — 80177 DRUG SCRN QUAN LEVETIRACETAM: CPT

## 2024-04-12 LAB — LACOSAMIDE SERPL-MCNC: 7.1 UG/ML (ref 5–10)

## 2024-04-13 LAB — LEVETIRACETAM SERPL-MCNC: 50.8 UG/ML (ref 10–40)

## 2024-04-15 LAB
FAX TO NUMBER: NORMAL
TEST RESULTS FAXED TO: NORMAL

## 2025-05-06 ENCOUNTER — APPOINTMENT (OUTPATIENT)
Facility: HOSPITAL | Age: 65
End: 2025-05-06
Payer: MEDICARE

## 2025-05-06 ENCOUNTER — HOSPITAL ENCOUNTER (EMERGENCY)
Facility: HOSPITAL | Age: 65
Discharge: HOME | End: 2025-05-06
Attending: EMERGENCY MEDICINE
Payer: MEDICARE

## 2025-05-06 VITALS
OXYGEN SATURATION: 95 % | WEIGHT: 173 LBS | SYSTOLIC BLOOD PRESSURE: 150 MMHG | RESPIRATION RATE: 18 BRPM | TEMPERATURE: 99.5 F | HEIGHT: 62 IN | HEART RATE: 88 BPM | DIASTOLIC BLOOD PRESSURE: 80 MMHG | BODY MASS INDEX: 31.83 KG/M2

## 2025-05-06 DIAGNOSIS — R19.7 DIARRHEA, UNSPECIFIED TYPE: ICD-10-CM

## 2025-05-06 DIAGNOSIS — R10.9 ABDOMINAL PAIN, UNSPECIFIED ABDOMINAL LOCATION: Primary | ICD-10-CM

## 2025-05-06 LAB
ALBUMIN SERPL-MCNC: 4.1 G/DL (ref 3.4–5)
ALBUMIN/GLOB SERPL: 1.4 (ref 0.8–1.7)
ALP SERPL-CCNC: 74 U/L (ref 45–117)
ALT SERPL-CCNC: 23 U/L (ref 10–35)
ANION GAP SERPL CALC-SCNC: 12 MMOL/L (ref 3–18)
APPEARANCE UR: ABNORMAL
AST SERPL-CCNC: 40 U/L (ref 10–38)
BACTERIA URNS QL MICRO: ABNORMAL /HPF
BASOPHILS # BLD: 0.04 K/UL (ref 0–0.1)
BASOPHILS NFR BLD: 0.6 % (ref 0–2)
BILIRUB SERPL-MCNC: 0.5 MG/DL (ref 0.2–1)
BILIRUB UR QL: NEGATIVE
BUN SERPL-MCNC: 11 MG/DL (ref 6–23)
BUN/CREAT SERPL: 11 (ref 12–20)
C COLI+JEJUNI TUF STL QL NAA+PROBE: NEGATIVE
CALCIUM SERPL-MCNC: 9.7 MG/DL (ref 8.5–10.1)
CHLORIDE SERPL-SCNC: 105 MMOL/L (ref 98–107)
CO2 SERPL-SCNC: 25 MMOL/L (ref 21–32)
COLOR UR: YELLOW
CREAT SERPL-MCNC: 0.97 MG/DL (ref 0.6–1.3)
DIFFERENTIAL METHOD BLD: ABNORMAL
EC STX1+STX2 GENES STL QL NAA+PROBE: NEGATIVE
EOSINOPHIL # BLD: 0.33 K/UL (ref 0–0.4)
EOSINOPHIL NFR BLD: 5.1 % (ref 0–5)
EPITH CASTS URNS QL MICRO: ABNORMAL /LPF (ref 0–5)
ERYTHROCYTE [DISTWIDTH] IN BLOOD BY AUTOMATED COUNT: 12.4 % (ref 11.6–14.5)
ETEC ELTA+ESTB GENES STL QL NAA+PROBE: NEGATIVE
GLOBULIN SER CALC-MCNC: 2.8 G/DL (ref 2–4)
GLUCOSE SERPL-MCNC: 123 MG/DL (ref 74–108)
GLUCOSE UR STRIP.AUTO-MCNC: NEGATIVE MG/DL
HCT VFR BLD AUTO: 39.3 % (ref 35–45)
HGB BLD-MCNC: 13.2 G/DL (ref 12–16)
HGB UR QL STRIP: NEGATIVE
IMM GRANULOCYTES # BLD AUTO: 0.01 K/UL (ref 0–0.04)
IMM GRANULOCYTES NFR BLD AUTO: 0.2 % (ref 0–0.5)
KETONES UR QL STRIP.AUTO: ABNORMAL MG/DL
LEUKOCYTE ESTERASE UR QL STRIP.AUTO: ABNORMAL
LIPASE SERPL-CCNC: 27 U/L (ref 13–75)
LYMPHOCYTES # BLD: 1.45 K/UL (ref 0.9–3.3)
LYMPHOCYTES NFR BLD: 22.6 % (ref 21–52)
MAGNESIUM SERPL-MCNC: 2.1 MG/DL (ref 1.6–2.6)
MCH RBC QN AUTO: 31.6 PG (ref 24–34)
MCHC RBC AUTO-ENTMCNC: 33.6 G/DL (ref 31–37)
MCV RBC AUTO: 94 FL (ref 78–100)
MONOCYTES # BLD: 0.43 K/UL (ref 0.05–1.2)
MONOCYTES NFR BLD: 6.7 % (ref 3–10)
MUCOUS THREADS URNS QL MICRO: ABNORMAL /LPF
NEUTS SEG # BLD: 4.15 K/UL (ref 1.8–8)
NEUTS SEG NFR BLD: 64.8 % (ref 40–73)
NITRITE UR QL STRIP.AUTO: NEGATIVE
NRBC # BLD: 0 K/UL (ref 0–0.01)
NRBC BLD-RTO: 0 PER 100 WBC
P SHIGELLOIDES DNA STL QL NAA+PROBE: NEGATIVE
PH UR STRIP: 8 (ref 5–8)
PLATELET # BLD AUTO: 363 K/UL (ref 135–420)
PMV BLD AUTO: 10.3 FL (ref 9.2–11.8)
POTASSIUM SERPL-SCNC: 4.1 MMOL/L (ref 3.5–5.5)
PROT SERPL-MCNC: 6.9 G/DL (ref 6.4–8.2)
PROT UR STRIP-MCNC: 30 MG/DL
RBC # BLD AUTO: 4.18 M/UL (ref 4.2–5.3)
RBC #/AREA URNS HPF: ABNORMAL /HPF (ref 0–5)
SALMONELLA SP SPAO STL QL NAA+PROBE: NEGATIVE
SHIGELLA SP+EIEC IPAH STL QL NAA+PROBE: NEGATIVE
SODIUM SERPL-SCNC: 141 MMOL/L (ref 136–145)
SP GR UR REFRACTOMETRY: 1.02 (ref 1–1.03)
UROBILINOGEN UR QL STRIP.AUTO: 0.2 EU/DL (ref 0.2–1)
V CHOL+PARA+VUL DNA STL QL NAA+NON-PROBE: NEGATIVE
WBC # BLD AUTO: 6.4 K/UL (ref 4.6–13.2)
WBC URNS QL MICRO: ABNORMAL /HPF (ref 0–5)
Y ENTEROCOL DNA STL QL NAA+NON-PROBE: NEGATIVE

## 2025-05-06 PROCEDURE — 85025 COMPLETE CBC W/AUTO DIFF WBC: CPT

## 2025-05-06 PROCEDURE — 96375 TX/PRO/DX INJ NEW DRUG ADDON: CPT

## 2025-05-06 PROCEDURE — 6360000002 HC RX W HCPCS: Performed by: EMERGENCY MEDICINE

## 2025-05-06 PROCEDURE — 74177 CT ABD & PELVIS W/CONTRAST: CPT

## 2025-05-06 PROCEDURE — 96361 HYDRATE IV INFUSION ADD-ON: CPT

## 2025-05-06 PROCEDURE — 81001 URINALYSIS AUTO W/SCOPE: CPT

## 2025-05-06 PROCEDURE — 99285 EMERGENCY DEPT VISIT HI MDM: CPT

## 2025-05-06 PROCEDURE — 6370000000 HC RX 637 (ALT 250 FOR IP): Performed by: EMERGENCY MEDICINE

## 2025-05-06 PROCEDURE — 87449 NOS EACH ORGANISM AG IA: CPT

## 2025-05-06 PROCEDURE — 83690 ASSAY OF LIPASE: CPT

## 2025-05-06 PROCEDURE — 6360000004 HC RX CONTRAST MEDICATION: Performed by: EMERGENCY MEDICINE

## 2025-05-06 PROCEDURE — 2580000003 HC RX 258: Performed by: EMERGENCY MEDICINE

## 2025-05-06 PROCEDURE — 87506 IADNA-DNA/RNA PROBE TQ 6-11: CPT

## 2025-05-06 PROCEDURE — 83735 ASSAY OF MAGNESIUM: CPT

## 2025-05-06 PROCEDURE — 87324 CLOSTRIDIUM AG IA: CPT

## 2025-05-06 PROCEDURE — 80053 COMPREHEN METABOLIC PANEL: CPT

## 2025-05-06 PROCEDURE — 96374 THER/PROPH/DIAG INJ IV PUSH: CPT

## 2025-05-06 RX ORDER — OXYCODONE HYDROCHLORIDE 5 MG/1
5 TABLET ORAL
Refills: 0 | Status: COMPLETED | OUTPATIENT
Start: 2025-05-06 | End: 2025-05-06

## 2025-05-06 RX ORDER — LOPERAMIDE HYDROCHLORIDE 2 MG/1
2 CAPSULE ORAL
Status: COMPLETED | OUTPATIENT
Start: 2025-05-06 | End: 2025-05-06

## 2025-05-06 RX ORDER — 0.9 % SODIUM CHLORIDE 0.9 %
1000 INTRAVENOUS SOLUTION INTRAVENOUS ONCE
Status: COMPLETED | OUTPATIENT
Start: 2025-05-06 | End: 2025-05-06

## 2025-05-06 RX ORDER — DIPHENOXYLATE HYDROCHLORIDE AND ATROPINE SULFATE 2.5; .025 MG/1; MG/1
1 TABLET ORAL 4 TIMES DAILY PRN
Qty: 18 TABLET | Refills: 0 | Status: SHIPPED | OUTPATIENT
Start: 2025-05-06 | End: 2025-05-16

## 2025-05-06 RX ORDER — DIPHENHYDRAMINE HYDROCHLORIDE 50 MG/ML
25 INJECTION, SOLUTION INTRAMUSCULAR; INTRAVENOUS
Status: COMPLETED | OUTPATIENT
Start: 2025-05-06 | End: 2025-05-06

## 2025-05-06 RX ORDER — MORPHINE SULFATE 4 MG/ML
4 INJECTION, SOLUTION INTRAMUSCULAR; INTRAVENOUS
Status: COMPLETED | OUTPATIENT
Start: 2025-05-06 | End: 2025-05-06

## 2025-05-06 RX ORDER — ONDANSETRON 2 MG/ML
4 INJECTION INTRAMUSCULAR; INTRAVENOUS
Status: COMPLETED | OUTPATIENT
Start: 2025-05-06 | End: 2025-05-06

## 2025-05-06 RX ORDER — IOPAMIDOL 612 MG/ML
100 INJECTION, SOLUTION INTRAVASCULAR
Status: COMPLETED | OUTPATIENT
Start: 2025-05-06 | End: 2025-05-06

## 2025-05-06 RX ADMIN — IOPAMIDOL 100 ML: 612 INJECTION, SOLUTION INTRAVENOUS at 12:37

## 2025-05-06 RX ADMIN — DIPHENHYDRAMINE HYDROCHLORIDE 25 MG: 50 INJECTION INTRAMUSCULAR; INTRAVENOUS at 10:17

## 2025-05-06 RX ADMIN — ONDANSETRON HYDROCHLORIDE 4 MG: 2 INJECTION INTRAMUSCULAR; INTRAVENOUS at 10:42

## 2025-05-06 RX ADMIN — MORPHINE SULFATE 4 MG: 4 INJECTION, SOLUTION INTRAMUSCULAR; INTRAVENOUS at 10:18

## 2025-05-06 RX ADMIN — OXYCODONE HYDROCHLORIDE 5 MG: 5 TABLET ORAL at 13:39

## 2025-05-06 RX ADMIN — LOPERAMIDE HYDROCHLORIDE 2 MG: 2 CAPSULE ORAL at 13:38

## 2025-05-06 RX ADMIN — SODIUM CHLORIDE 1000 ML: 0.9 INJECTION, SOLUTION INTRAVENOUS at 10:17

## 2025-05-06 ASSESSMENT — PAIN - FUNCTIONAL ASSESSMENT: PAIN_FUNCTIONAL_ASSESSMENT: 0-10

## 2025-05-06 ASSESSMENT — PAIN SCALES - GENERAL
PAINLEVEL_OUTOF10: 7
PAINLEVEL_OUTOF10: 7
PAINLEVEL_OUTOF10: 8

## 2025-05-06 ASSESSMENT — PAIN DESCRIPTION - LOCATION: LOCATION: ABDOMEN

## 2025-05-06 NOTE — ED TRIAGE NOTES
Patient arrives via private vehicle for abdominal and back pain, as well as diarrhea. Went to her family  doctor where she was diagnosed with diverticulitis, but it has not gotten any better. Patient states that their pain/symptoms started two weeks ago.     Active Ambulatory Problems     Diagnosis Date Noted    Asthma exacerbation 11/01/2016    Seizures (HCC) 02/14/2020    CAP (community acquired pneumonia) 02/14/2020    Failure of outpatient treatment 02/14/2020    Community acquired pneumonia of right lower lobe of lung 11/01/2016    Tachycardia 11/01/2016    Hypokalemia 11/01/2016    Acute respiratory insufficiency 10/31/2016    Moderate persistent asthma with acute exacerbation 02/14/2020    Osteoarthritis of right knee 10/26/2021    HTN (hypertension) 11/01/2016    Hypoxia 03/22/2017    PRESLEY (acute kidney injury) 11/01/2016    Constipation 03/22/2017     Resolved Ambulatory Problems     Diagnosis Date Noted    No Resolved Ambulatory Problems     Past Medical History:   Diagnosis Date    Arthritis     Asthma     Cancer (HCC)     Chronic kidney disease     Diabetes (HCC)     Dizziness     Hypertension     Migraines     PUD (peptic ulcer disease)     Stroke (HCC) 2006    Unspecified sleep apnea        A&Ox4.

## 2025-05-06 NOTE — ED PROVIDER NOTES
medications:  Medications   sodium chloride 0.9 % bolus 1,000 mL (0 mLs IntraVENous Stopped 5/6/25 1339)   diphenhydrAMINE (BENADRYL) injection 25 mg (25 mg IntraVENous Given 5/6/25 1017)   morphine sulfate (PF) injection 4 mg (4 mg IntraVENous Given 5/6/25 1018)   ondansetron (ZOFRAN) injection 4 mg (4 mg IntraVENous Given 5/6/25 1042)   iopamidol (ISOVUE-300) 61 % injection 100 mL (100 mLs IntraVENous Given 5/6/25 1237)   loperamide (IMODIUM) capsule 2 mg (2 mg Oral Given 5/6/25 1338)   oxyCODONE (ROXICODONE) immediate release tablet 5 mg (5 mg Oral Given 5/6/25 1339)              PROGRESS  Shari Zhang's  results have been reviewed with her.  She has been counseled regarding her diagnosis.  She verbally conveys understanding and agreement of the signs, symptoms, diagnosis, treatment and prognosis and additionally agrees to follow up as recommended with Rita Araiza MD in 24 - 48 hours.  She also agrees with the care-plan and conveys that all of her questions have been answered.  I have also put together some discharge instructions for her that include: 1) educational information regarding their diagnosis, 2) how to care for their diagnosis at home, as well a 3) list of reasons why they would want to return to the ED prior to their follow-up appointment, should their condition change.      Disposition:  home    PLAN:  1. DISCHARGE MEDICATIONS:  Discharge Medication List as of 5/6/2025  2:12 PM             Details   diphenoxylate-atropine (LOMOTIL) 2.5-0.025 MG per tablet Take 1 tablet by mouth 4 times daily as needed for Diarrhea for up to 10 days. Max Daily Amount: 4 tablets, Disp-18 tablet, R-0Normal                Details   acetaminophen (TYLENOL) 500 MG tablet Take 1,000 mg by mouth every 6 hours as neededHistorical Med      albuterol sulfate HFA (PROVENTIL;VENTOLIN;PROAIR) 108 (90 Base) MCG/ACT inhaler Inhale 2 puffs into the lungs every 4 hours as neededHistorical Med      amLODIPine (NORVASC) 5    1. Abdominal pain, unspecified abdominal location    2. Diarrhea, unspecified type          I, Ezequiel Sullivan MD am the first provider for this patient and am the attending of record for this patient encounter.    Ezequiel Sullivan MD        Please note that this dictation was completed with Dragon, computer voice recognition software.  Quite often unanticipated grammatical, syntax, homophones, and other interpretive errors are inadvertently transcribed by the computer software.  Please disregard these errors.  Additionally, please excuse any errors that have escaped final proofreading.         Ezequiel Sullivan MD  05/07/25 8998

## 2025-05-07 LAB
C DIFF GDH STL QL: NEGATIVE
C DIFF TOX A+B STL QL IA: NEGATIVE
C DIFF TOXIN INTERPRETATION: NORMAL

## 2025-05-09 NOTE — PROGRESS NOTES
[facility] [place] [place] [facility]  Pulmonary, Critical Care, and Sleep Medicine         Name: Michael Desai MRN: [id number]   : 1960 Hospital: [facility] [place]   Date: 3/26/2017        Subjective:   3/26  No issues overnight. Off oxgyen supplement. Tolerating po. Significantly less anxious. Ready to go home    \  This patient has been seen and evaluated at the request of Dr. Ron Levin for acute asthma exacerbation. Patient is a 55 y.o. female with multiple medical problems listed below presented with s/o nonproductive cough and severe wheezing. No relief with bronchodilators , presented to ED yesterday and was admitted with asthma exacerbation. Pt had similar episodes in 2016. At that time I thought patient may have severe anxiety disorder and VCD in addition to asthma. Pt recalls converstation. Pt has been on singulair as out patient. Pt has anxiety disorder currently on lexapro. No fever or chills. No n/v/d. Able to tolerate po. Had breakfast and lunch without difficulty. Past Medical History:   Diagnosis Date    Arthritis     Asthma     Chronic kidney disease     deformed right  kidney    Diabetes ([address]     Hypertension     Migraines     PUD (peptic ulcer disease)     H/O, no meds at this time     Seizures ([address]     Unspecified sleep apnea     uses cpap      Past Surgical History:   Procedure Laterality Date    HX APPENDECTOMY      HX GYN      hysterectomy      Prior to Admission medications    Medication Sig Start Date End Date Taking? Authorizing Provider   montelukast (SINGULAIR) 10 mg tablet Take 10 mg by mouth daily. Yes Historical Provider   naproxen (NAPROSYN) 500 mg tablet Take 500 mg by mouth two (2) times daily as needed. Yes Historical Provider   ondansetron (ZOFRAN ODT) 4 mg disintegrating tablet Take 4 mg by mouth every eight (8) hours as needed for Nausea.    Yes Orville Gamble MD   raNITIdine (ZANTAC) 150 mg tablet Take 150 mg by mouth two (2) VSS.  Patient tolerating oral liquids without difficulty.   No apparent s&s of distress noted at this time, no complaints voiced at this time.   Discharge instructions reviewed with patient/family/friend with good verbal feedback received.   Post op medications bedside delivery, DME none.  Patient ready for discharge.    times a day. Yes Orville Gamble MD   conjugated estrogens (PREMARIN) 0.9 mg tablet Take 0.9 mg by mouth daily. Yes Orville Gamble MD   fluticasone-salmeterol (ADVAIR) 250-50 mcg/dose diskus inhaler Take 1 Puff by inhalation two (2) times a day. 11/9/16  Yes Elias Covington MD   albuterol (PROVENTIL VENTOLIN) 2.5 mg /3 mL (0.083 %) nebulizer solution 3 mL by Nebulization route three (3) times daily. 11/9/16  Yes Elias Covington MD   albuterol [facility] HFA) 90 mcg/actuation inhaler Take 2 Puffs by inhalation every four (4) hours as needed for Wheezing. Yes Historical Provider   escitalopram oxalate (LEXAPRO) 10 mg tablet Take 15 mg by mouth daily. Yes Historical Provider   aspirin delayed-release 81 mg tablet Take 81 mg by mouth daily. Yes Historical Provider   levETIRAcetam (KEPPRA) 500 mg tablet Take 500 mg by mouth two (2) times a day. Yes Kirk Ryan MD   losartan-hydrochlorothiazide (HYZAAR) 100-25 mg per tablet Take 1 Tab by mouth daily. Yes Kirk Ryan MD   albuterol (PROVENTIL, VENTOLIN) 90 mcg/actuation inhaler Take 1-2 Puffs by inhalation every four (4) hours as needed for Wheezing. 12/15/12  Yes Kimberly Raza MD   OXcarbazepine (TRILEPTAL) 300 mg tablet Take 300 mg by mouth two (2) times a day. Yes Kirk Ryan MD   acetaminophen (TYLENOL) 500 mg tablet Take 1,000 mg by mouth every six (6) hours as needed for Pain or Fever. Orville Gamble MD   SUMAtriptan succinate 6 mg/0.5 mL kit 6 mg by SubCUTAneous route once as needed for Migraine.     Kirk Ryan MD     Allergies   Allergen Reactions    Morphine Itching     Can tolerate if premed w/ benadryl    Pertussis Vaccine,Adsorbed Swelling     Swelling at injection site    Vicodin [Hydrocodone-Acetaminophen] Itching     Can tolerate if premed w/ benadryl      Social History   Substance Use Topics    Smoking status: Never Smoker    Smokeless tobacco: Not on file    Alcohol use No      Family History   Problem Relation Age of Onset    Malignant Hyperthermia Neg Hx     Pseudocholinesterase Deficiency Neg Hx     Delayed Awakening Neg Hx     Post-op Nausea/Vomiting Neg Hx     Emergence Delirium Neg Hx     Post-op Cognitive Dysfunction Neg Hx     Other Neg Hx         Current Facility-Administered Medications   Medication Dose Route Frequency    aspirin delayed-release tablet 81 mg  81 mg Oral DAILY    levETIRAcetam (KEPPRA) tablet 500 mg  500 mg Oral BID    OXcarbazepine (TRILEPTAL) tablet 300 mg  300 mg Oral BID    losartan/hydroCHLOROthiazide (HYZAAR) 100/25 mg   Oral DAILY    levoFLOXacin (LEVAQUIN) 500 mg in D5W IVPB  500 mg IntraVENous Q24H    arformoterol (BROVANA) neb solution 15 mcg  15 mcg Nebulization BID RT    budesonide (PULMICORT) 500 mcg/2 ml nebulizer suspension  500 mcg Nebulization BID RT    insulin lispro (HUMALOG) injection   SubCUTAneous AC&HS    pantoprazole (PROTONIX) tablet 40 mg  40 mg Oral ACB    methylPREDNISolone (PF) (SOLU-MEDROL) injection 60 mg  60 mg IntraVENous Q6H    montelukast (SINGULAIR) tablet 10 mg  10 mg Oral QHS    dronabinol (MARINOL) capsule 2.5 mg  2.5 mg Oral ACB&D    clonazePAM (KlonoPIN) tablet 0.5 mg  0.5 mg Oral TID    enoxaparin (LOVENOX) injection 40 mg  40 mg SubCUTAneous Q24H               Objective:   Vital Signs:    Visit Vitals    /83 (BP 1 Location: Left arm, BP Patient Position: At rest)    Pulse 91    Temp 97.2 °F (36.2 °C)    Resp 16    Ht 5' 2\" (1.575 m)    Wt 77.4 kg (170 lb 9.6 oz)    SpO2 98%    BMI 31.2 kg/m2       O2 Device: Room air   O2 Flow Rate (L/min): 2 l/min   Temp (24hrs), Av.5 °F (36.4 °C), Min:97.2 °F (36.2 °C), Max:98.1 °F (36.7 °C)       Intake/Output:     Intake/Output Summary (Last 24 hours) at 17 09  Last data filed at 17 0813   Gross per 24 hour   Intake                0 ml   Output              750 ml   Net             -750 ml         Physical Exam:   General: alert and appropriate.    HEENT;  PERRLA  Neck: No adenopathy or thyroid swelling  CVS: S1S2 no murmurs  RS: Mod AE bilaterally,  Good excursion, scatter wheezing,  Much of the wheezing is coming for neck/vocal cord. Abd: soft, non tender, no hepatosplenomegaly  Neuro: non focal, awake,    Extrm: no leg edema   Skin: no rash    Data review:   Labs:  No results for input(s): WBC, HGB, HCT, PLT, HGBEXT, HCTEXT, PLTEXT, HGBEXT, HCTEXT, PLTEXT in the last 72 hours. Recent Labs      03/24/17   0248   CREA  0.97     No results for input(s): PH, PCO2, PO2, HCO3, FIO2 in the last 72 hours. Imaging:  I have personally reviewed the patients radiographs and have reviewed the reports: Allergies        Unspecified: Morphine;  Pertussis Vaccine,adsorbed;  Vicodin [Hydrocodone-acetaminophen]       Result Information      Status Provider Status        Final result (Exam End: 3/20/2017 12:48 PM) Open        Study Result      EXAM:Chest X-Ray      History: Shortness of breath     Technique: Portable Frontal View     Comparison: 12/23/2016, 11/7/2016     _______________     FINDINGS:  Nonspecific mild to moderate pulmonary hypoinflation degraded examination. The trachea is midline. The cardiomediastinal silhouette is midline and, within normal limits. Patchy left infrahilar opacity partially obscuring the distal left heart border,  with retained left diaphragmatic margin. Nonspecific right hemithoracic  interstitial prominence. No pneumothorax or effusion.   Intact osseous structures.     _______________     IMPRESSION  IMPRESSION:     1. Pulmonary hypoinflation degraded examination with asymmetric nonspecific  infrahilar left lower lung opacity, which may represent atelectasis/pneumonia.                   IMPRESSION:   · Acute asthma exacerbation, improving  · VCD   · Severe anxiety disorder, further exacerbated with excessive B2 agonist  · Sleep apnea on cpap at night  · ckd per hx      RECOMMENDATIONS:   · brovana and pulmicort  · Decrease the steroid  · Prn duoneb  · singulair  · Continue klonopin and marinol  ·  DVT, PUD prophylaxis  ·            Bronwyn Jose MD

## 2025-07-03 ENCOUNTER — APPOINTMENT (OUTPATIENT)
Facility: HOSPITAL | Age: 65
End: 2025-07-03
Payer: MEDICARE

## 2025-07-03 ENCOUNTER — HOSPITAL ENCOUNTER (EMERGENCY)
Facility: HOSPITAL | Age: 65
Discharge: HOME | End: 2025-07-03
Attending: EMERGENCY MEDICINE
Payer: MEDICARE

## 2025-07-03 VITALS
TEMPERATURE: 98.1 F | RESPIRATION RATE: 16 BRPM | HEIGHT: 62 IN | SYSTOLIC BLOOD PRESSURE: 133 MMHG | WEIGHT: 173 LBS | OXYGEN SATURATION: 99 % | BODY MASS INDEX: 31.83 KG/M2 | DIASTOLIC BLOOD PRESSURE: 77 MMHG | HEART RATE: 67 BPM

## 2025-07-03 DIAGNOSIS — R11.0 NAUSEA: Primary | ICD-10-CM

## 2025-07-03 DIAGNOSIS — R10.32 LEFT LOWER QUADRANT ABDOMINAL PAIN: ICD-10-CM

## 2025-07-03 DIAGNOSIS — R19.7 DIARRHEA, UNSPECIFIED TYPE: ICD-10-CM

## 2025-07-03 LAB
ALBUMIN SERPL-MCNC: 4 G/DL (ref 3.4–5)
ALBUMIN/GLOB SERPL: 1.5 (ref 0.8–1.7)
ALP SERPL-CCNC: 69 U/L (ref 45–117)
ALT SERPL-CCNC: 20 U/L (ref 10–35)
ANION GAP SERPL CALC-SCNC: 11 MMOL/L (ref 3–18)
AST SERPL-CCNC: 25 U/L (ref 10–38)
BASOPHILS # BLD: 0.03 K/UL (ref 0–0.1)
BASOPHILS NFR BLD: 0.4 % (ref 0–2)
BILIRUB SERPL-MCNC: 0.6 MG/DL (ref 0.2–1)
BUN SERPL-MCNC: 13 MG/DL (ref 6–23)
BUN/CREAT SERPL: 12 (ref 12–20)
CALCIUM SERPL-MCNC: 9.7 MG/DL (ref 8.5–10.1)
CHLORIDE SERPL-SCNC: 103 MMOL/L (ref 98–107)
CO2 SERPL-SCNC: 25 MMOL/L (ref 21–32)
CREAT SERPL-MCNC: 1.05 MG/DL (ref 0.6–1.3)
DIFFERENTIAL METHOD BLD: ABNORMAL
EOSINOPHIL # BLD: 0.26 K/UL (ref 0–0.4)
EOSINOPHIL NFR BLD: 3.9 % (ref 0–5)
ERYTHROCYTE [DISTWIDTH] IN BLOOD BY AUTOMATED COUNT: 11.9 % (ref 11.6–14.5)
GLOBULIN SER CALC-MCNC: 2.7 G/DL (ref 2–4)
GLUCOSE SERPL-MCNC: 96 MG/DL (ref 74–108)
HCT VFR BLD AUTO: 37 % (ref 35–45)
HGB BLD-MCNC: 12.4 G/DL (ref 12–16)
IMM GRANULOCYTES # BLD AUTO: 0.01 K/UL (ref 0–0.04)
IMM GRANULOCYTES NFR BLD AUTO: 0.1 % (ref 0–0.5)
LIPASE SERPL-CCNC: 29 U/L (ref 13–75)
LYMPHOCYTES # BLD: 1.3 K/UL (ref 0.9–3.3)
LYMPHOCYTES NFR BLD: 19.4 % (ref 21–52)
MCH RBC QN AUTO: 31.5 PG (ref 24–34)
MCHC RBC AUTO-ENTMCNC: 33.5 G/DL (ref 31–37)
MCV RBC AUTO: 93.9 FL (ref 78–100)
MONOCYTES # BLD: 0.45 K/UL (ref 0.05–1.2)
MONOCYTES NFR BLD: 6.7 % (ref 3–10)
NEUTS SEG # BLD: 4.66 K/UL (ref 1.8–8)
NEUTS SEG NFR BLD: 69.5 % (ref 40–73)
NRBC # BLD: 0 K/UL (ref 0–0.01)
NRBC BLD-RTO: 0 PER 100 WBC
PLATELET # BLD AUTO: 418 K/UL (ref 135–420)
PMV BLD AUTO: 10.5 FL (ref 9.2–11.8)
POTASSIUM SERPL-SCNC: 4.3 MMOL/L (ref 3.5–5.5)
PROT SERPL-MCNC: 6.8 G/DL (ref 6.4–8.2)
RBC # BLD AUTO: 3.94 M/UL (ref 4.2–5.3)
SODIUM SERPL-SCNC: 140 MMOL/L (ref 136–145)
WBC # BLD AUTO: 6.7 K/UL (ref 4.6–13.2)

## 2025-07-03 PROCEDURE — 85025 COMPLETE CBC W/AUTO DIFF WBC: CPT

## 2025-07-03 PROCEDURE — 80053 COMPREHEN METABOLIC PANEL: CPT

## 2025-07-03 PROCEDURE — 99285 EMERGENCY DEPT VISIT HI MDM: CPT

## 2025-07-03 PROCEDURE — 83690 ASSAY OF LIPASE: CPT

## 2025-07-03 PROCEDURE — 6360000004 HC RX CONTRAST MEDICATION: Performed by: EMERGENCY MEDICINE

## 2025-07-03 PROCEDURE — 96375 TX/PRO/DX INJ NEW DRUG ADDON: CPT

## 2025-07-03 PROCEDURE — 96374 THER/PROPH/DIAG INJ IV PUSH: CPT

## 2025-07-03 PROCEDURE — 74177 CT ABD & PELVIS W/CONTRAST: CPT

## 2025-07-03 PROCEDURE — 6370000000 HC RX 637 (ALT 250 FOR IP): Performed by: EMERGENCY MEDICINE

## 2025-07-03 PROCEDURE — 6360000002 HC RX W HCPCS: Performed by: EMERGENCY MEDICINE

## 2025-07-03 RX ORDER — SIMETHICONE 125 MG
1 CAPSULE ORAL EVERY 6 HOURS PRN
Qty: 28 CAPSULE | Refills: 0 | Status: SHIPPED | OUTPATIENT
Start: 2025-07-03

## 2025-07-03 RX ORDER — ONDANSETRON 4 MG/1
4 TABLET, ORALLY DISINTEGRATING ORAL EVERY 8 HOURS PRN
Qty: 20 TABLET | Refills: 0 | Status: SHIPPED | OUTPATIENT
Start: 2025-07-03

## 2025-07-03 RX ORDER — KETOROLAC TROMETHAMINE 15 MG/ML
15 INJECTION, SOLUTION INTRAMUSCULAR; INTRAVENOUS
Status: COMPLETED | OUTPATIENT
Start: 2025-07-03 | End: 2025-07-03

## 2025-07-03 RX ORDER — IOPAMIDOL 612 MG/ML
100 INJECTION, SOLUTION INTRAVASCULAR
Status: COMPLETED | OUTPATIENT
Start: 2025-07-03 | End: 2025-07-03

## 2025-07-03 RX ORDER — DICYCLOMINE HYDROCHLORIDE 10 MG/1
20 CAPSULE ORAL
Status: COMPLETED | OUTPATIENT
Start: 2025-07-03 | End: 2025-07-03

## 2025-07-03 RX ORDER — DROPERIDOL 2.5 MG/ML
0.62 INJECTION, SOLUTION INTRAMUSCULAR; INTRAVENOUS ONCE
Status: COMPLETED | OUTPATIENT
Start: 2025-07-03 | End: 2025-07-03

## 2025-07-03 RX ORDER — ONDANSETRON 2 MG/ML
4 INJECTION INTRAMUSCULAR; INTRAVENOUS
Status: COMPLETED | OUTPATIENT
Start: 2025-07-03 | End: 2025-07-03

## 2025-07-03 RX ORDER — DICYCLOMINE HCL 20 MG
20 TABLET ORAL EVERY 6 HOURS
Qty: 24 TABLET | Refills: 0 | Status: SHIPPED | OUTPATIENT
Start: 2025-07-03

## 2025-07-03 RX ADMIN — IOPAMIDOL 100 ML: 612 INJECTION, SOLUTION INTRAVENOUS at 15:02

## 2025-07-03 RX ADMIN — DROPERIDOL 0.62 MG: 2.5 INJECTION, SOLUTION INTRAMUSCULAR; INTRAVENOUS at 13:32

## 2025-07-03 RX ADMIN — KETOROLAC TROMETHAMINE 15 MG: 15 INJECTION, SOLUTION INTRAMUSCULAR; INTRAVENOUS at 13:02

## 2025-07-03 RX ADMIN — DICYCLOMINE HYDROCHLORIDE 20 MG: 10 CAPSULE ORAL at 13:34

## 2025-07-03 RX ADMIN — ONDANSETRON 4 MG: 2 INJECTION, SOLUTION INTRAMUSCULAR; INTRAVENOUS at 12:46

## 2025-07-03 ASSESSMENT — PAIN SCALES - GENERAL: PAINLEVEL_OUTOF10: 10

## 2025-07-03 ASSESSMENT — PAIN - FUNCTIONAL ASSESSMENT: PAIN_FUNCTIONAL_ASSESSMENT: 0-10

## 2025-07-03 NOTE — ED PROVIDER NOTES
[facility] EMERGENCY DEPT  EMERGENCY DEPARTMENT ENCOUNTER    Patient Name: Shari Zhang  MRN: [id number]  YOB: 1960  Provider: Mervin Saini MD  PCP: Rita Patel MD   Time/Date of evaluation: 12:33 PM EDT on 7/3/25    History of Presenting Illness     Chief Complaint   Patient presents with    Abdominal Pain       History Provided by: Patient and Patient's    History is limited by: Nothing    HISTORY (Narative):   Shari Zhang is a 65 y.o. female with a PMHX of asthma, ovarian cancer, kidney malformation, hypertension, CVA, appendectomy who presents to the emergency department (room 11) by POV C/O left lower abdominal cramping onset yesterday. Associated sxs include radiation to the left flank, nausea, diarrhea. Patient denies any other sxs or complaints.     Nursing Notes were all reviewed and agreed with or any disagreements were addressed in the HPI.    Past History     PAST MEDICAL HISTORY:  Past Medical History:   Diagnosis Date    Arthritis     Asthma     Cancer (HCC)     ovarian    Chronic kidney disease     deformed right  kidney    Diabetes (HCC)     patient denies    Dizziness     Hypertension     Migraines     PUD (peptic ulcer disease)     H/O, no meds at this time 2016    Seizures (HCC)     last seizure Sept 2021    Stroke ([facility]) 2006     mild weakness right hand    Unspecified sleep apnea     uses cpap       PAST SURGICAL HISTORY:  Past Surgical History:   Procedure Laterality Date    APPENDECTOMY      COLONOSCOPY      GYN      hysterectomy    OTHER SURGICAL HISTORY      mole removed from neck       FAMILY HISTORY:  Family History   Problem Relation Age of Onset    Post-op Nausea/Vomiting Neg Hx     Delayed Awakening Neg Hx     Emergence Delirium Neg Hx     Pseudochol. Deficiency Neg Hx     Malig Hypertherm Neg Hx     Post-op Cognitive Dysfunction Neg Hx     Other Neg Hx        SOCIAL HISTORY:  Social History     Tobacco Use    Smoking status: Never    Smokeless tobacco:

## 2025-07-03 NOTE — DISCHARGE INSTRUCTIONS
Thank you for choosing [facility]'s Emergency Department for your care. It is our privilege to provide excellent care for you in your time of need. In the next several days, you may receive a survey via mail or email about your experience with our team. We would appreciate you taking a few minutes to complete the survey, as we use this information to learn what we have done well and what we could be doing better. Thank you for trusting us with your care.    -----------------------------------------------------------------------------  Below you will find a list of your tests from today's visit.   Labs  Recent Results (from the past 12 hours)   CBC with Auto Differential    Collection Time: 07/03/25 12:49 PM   Result Value Ref Range    WBC 6.7 4.6 - 13.2 K/uL    RBC 3.94 (L) 4.20 - 5.30 M/uL    Hemoglobin 12.4 12.0 - 16.0 g/dL    Hematocrit 37.0 35.0 - 45.0 %    MCV 93.9 78.0 - 100.0 FL    MCH 31.5 24.0 - 34.0 PG    MCHC 33.5 31.0 - 37.0 g/dL    RDW 11.9 11.6 - 14.5 %    Platelets 418 135 - 420 K/uL    MPV 10.5 9.2 - 11.8 FL    Nucleated RBCs 0.0 0  WBC    nRBC 0.00 0.00 - 0.01 K/uL    Neutrophils % 69.5 40.0 - 73.0 %    Lymphocytes % 19.4 (L) 21.0 - 52.0 %    Monocytes % 6.7 3.0 - 10.0 %    Eosinophils % 3.9 0.0 - 5.0 %    Basophils % 0.4 0.0 - 2.0 %    Immature Granulocytes % 0.1 0.0 - 0.5 %    Neutrophils Absolute 4.66 1.80 - 8.00 K/UL    Lymphocytes Absolute 1.30 0.90 - 3.30 K/UL    Monocytes Absolute 0.45 0.05 - 1.20 K/UL    Eosinophils Absolute 0.26 0.00 - 0.40 K/UL    Basophils Absolute 0.03 0.00 - 0.10 K/UL    Immature Granulocytes Absolute 0.01 0.00 - 0.04 K/UL    Differential Type AUTOMATED     Comprehensive Metabolic Panel    Collection Time: 07/03/25 12:49 PM   Result Value Ref Range    Sodium 140 136 - 145 mmol/L    Potassium 4.3 3.5 - 5.5 mmol/L    Chloride 103 98 - 107 mmol/L    CO2 25 21 - 32 mmol/L    Anion Gap 11 3 - 18 mmol/L    Glucose 96 74 - 108 mg/dL    BUN 13 6 - 23

## 2025-07-03 NOTE — ED TRIAGE NOTES
Patient arrives to ED with report of lower abdominal cramping radiating to left flank, +nausea and diarrhea.

## (undated) DEVICE — THE CANADY HYBRID PLASMA SCALPEL IS AN ELECTROSURGICAL PLASMA SCALPEL THAT USES AN 85MM BENDABLE PADDLE BLADE TIP. THE ELECTROSURGICAL PLASMA SCALPEL IS USED TO SIMULTANEOUSLY CUT AND COAGULATE BIOLOGICAL TISSUE.: Brand: CANADY HYBRID PLASMA PADDLE BLADE

## (undated) DEVICE — 3 BONE CEMENT MIXER: Brand: MIXEVAC

## (undated) DEVICE — SUT VCRL + 2-0 27IN CT2 UD -- 36/BX

## (undated) DEVICE — HOOD, PEEL-AWAY: Brand: FLYTE

## (undated) DEVICE — SYSTEM SKIN CLSR 22CM DERMBND PRINEO

## (undated) DEVICE — OPTIFOAM GENTLE SA, POSTOP, 4X12: Brand: MEDLINE

## (undated) DEVICE — ZIMMER® STERILE DISPOSABLE TOURNIQUET CUFF WITH PROTECTIVE SLEEVE AND PLC, SINGLE PORT, SINGLE BLADDER, 34 IN. (86 CM)

## (undated) DEVICE — NEEDLE SPNL 20GA L3.5IN YEL HUB S STL REG WALL FIT STYL W/

## (undated) DEVICE — HANDPIECE SET WITH HIGH FLOW TIP AND SUCTION TUBE: Brand: INTERPULSE

## (undated) DEVICE — PACK PROCEDURE SURG TOT KNEE CUST

## (undated) DEVICE — PREP SKN CHLRAPRP 26ML TNT -- CONVERT TO ITEM 373320

## (undated) DEVICE — GOWN,SIRUS,NONRNF,SETINSLV,XL,20/CS: Brand: MEDLINE

## (undated) DEVICE — SUTURE PDS + SZ 1 L96IN ABSRB VLT L65MM TP-1 1/2 CIR PDP880G

## (undated) DEVICE — SOLUTION LACTATED RINGERS INJECTION USP

## (undated) DEVICE — GLOVE ORANGE PI 8 1/2   MSG9085

## (undated) DEVICE — 3M™ STERI-DRAPE™ U-DRAPE 1015: Brand: STERI-DRAPE™

## (undated) DEVICE — GARMENT COMPR M FOR 13IN FT INTMIT SGL BLDR HEM FORC II

## (undated) DEVICE — UNDERCAST PADDING: Brand: DEROYAL

## (undated) DEVICE — SOL IRRIGATION INJ NACL 0.9% 500ML BTL

## (undated) DEVICE — SUT VCRL + 1 36IN CT1 VIO --

## (undated) DEVICE — BLADE SAW 1.19X20X90 MM FOR LG BNE

## (undated) DEVICE — NON RIMMED SPEED PIN 65MM STERILE

## (undated) DEVICE — GLOVE SURG SZ 85 L12IN FNGR ORTHO 126MIL CRM LTX FREE

## (undated) DEVICE — CONCISE CEMENT SCULPS KIT: Brand: CONCISE